# Patient Record
Sex: MALE | Race: WHITE | NOT HISPANIC OR LATINO | Employment: FULL TIME | URBAN - METROPOLITAN AREA
[De-identification: names, ages, dates, MRNs, and addresses within clinical notes are randomized per-mention and may not be internally consistent; named-entity substitution may affect disease eponyms.]

---

## 2018-02-07 ENCOUNTER — OFFICE VISIT (OUTPATIENT)
Dept: RHEUMATOLOGY | Facility: CLINIC | Age: 48
End: 2018-02-07
Payer: COMMERCIAL

## 2018-02-07 VITALS
HEIGHT: 70 IN | WEIGHT: 230 LBS | SYSTOLIC BLOOD PRESSURE: 130 MMHG | BODY MASS INDEX: 32.93 KG/M2 | DIASTOLIC BLOOD PRESSURE: 86 MMHG

## 2018-02-07 DIAGNOSIS — Z79.899 ENCOUNTER FOR LONG-TERM (CURRENT) DRUG USE: ICD-10-CM

## 2018-02-07 DIAGNOSIS — M10.9 GOUT, UNSPECIFIED CAUSE, UNSPECIFIED CHRONICITY, UNSPECIFIED SITE: Primary | ICD-10-CM

## 2018-02-07 LAB
ALBUMIN SERPL-MCNC: 4.4 G/DL (ref 3.1–4.7)
ALP SERPL-CCNC: 83 IU/L (ref 40–104)
ALT (SGPT): 25 IU/L (ref 3–33)
AST SERPL-CCNC: 15 IU/L (ref 10–40)
BASOPHILS NFR BLD: 0 K/UL (ref 0–0.2)
BASOPHILS NFR BLD: 0.3 %
BILIRUB SERPL-MCNC: 0.8 MG/DL (ref 0.3–1)
BUN SERPL-MCNC: 13 MG/DL (ref 8–20)
CALCIUM SERPL-MCNC: 9.6 MG/DL (ref 7.7–10.4)
CHLORIDE: 103 MMOL/L (ref 98–110)
CO2 SERPL-SCNC: 25.7 MMOL/L (ref 22.8–31.6)
CREATININE: 0.76 MG/DL (ref 0.6–1.4)
CRP SERPL-MCNC: 1.19 MG/DL (ref 0–1.4)
EOSINOPHIL NFR BLD: 0.3 K/UL (ref 0–0.7)
EOSINOPHIL NFR BLD: 2.6 %
ERYTHROCYTE [DISTWIDTH] IN BLOOD BY AUTOMATED COUNT: 14.3 % (ref 11.7–14.9)
GLUCOSE: 81 MG/DL (ref 70–99)
GRAN #: 8 K/UL (ref 1.4–6.5)
GRAN%: 68.5 %
HCT VFR BLD AUTO: 43 % (ref 39–55)
HGB BLD-MCNC: 14.1 G/DL (ref 14–16)
IMMATURE GRANS (ABS): 0 K/UL (ref 0–1)
IMMATURE GRANULOCYTES: 0.3 %
LYMPH #: 2.5 K/UL (ref 1.2–3.4)
LYMPH%: 21.5 %
MCH RBC QN AUTO: 27.5 PG (ref 25–35)
MCHC RBC AUTO-ENTMCNC: 32.8 G/DL (ref 31–36)
MCV RBC AUTO: 83.8 FL (ref 80–100)
MONO #: 0.8 K/UL (ref 0.1–0.6)
MONO%: 6.8 %
NUCLEATED RBCS: 0 %
PLATELET # BLD AUTO: 308 K/UL (ref 140–440)
PMV BLD AUTO: 10.5 FL (ref 8.8–12.7)
POTASSIUM SERPL-SCNC: 3.9 MMOL/L (ref 3.5–5)
PROT SERPL-MCNC: 7.2 G/DL (ref 6–8.2)
RBC # BLD AUTO: 5.13 M/UL (ref 4.3–5.9)
SODIUM: 137 MMOL/L (ref 134–144)
WBC # BLD AUTO: 11.7 K/UL (ref 5–10)

## 2018-02-07 PROCEDURE — 99204 OFFICE O/P NEW MOD 45 MIN: CPT | Mod: ,,, | Performed by: INTERNAL MEDICINE

## 2018-02-07 PROCEDURE — 3008F BODY MASS INDEX DOCD: CPT | Mod: ,,, | Performed by: INTERNAL MEDICINE

## 2018-02-07 RX ORDER — SULINDAC 200 MG/1
200 TABLET ORAL 2 TIMES DAILY
Qty: 60 TABLET | Refills: 5 | Status: SHIPPED | OUTPATIENT
Start: 2018-02-07 | End: 2018-09-01 | Stop reason: SDUPTHER

## 2018-02-07 NOTE — PROGRESS NOTES
[facility] RHEUMATOLOGY           New patient visit      Subjective:       Patient ID:   NAME: Shayne Huerta Jr. : 1970     47 y.o. male    Referring Doc: No ref. provider found  Other Physicians:    Chief Complaint:  Consult (new patient - referred by Dr. Schroeder) and Gout (had surgery on left knee for infection and gout)        HPI:   This patient is referred by Dr. Schroeder for the evaluation and management of gout. He states that he has had problems with gout for perhaps 20 years. 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 Fluid was apparently aspirated and urate crystals were identified. He was placed on allopurinol and as needed Indocin and apparently did quite well with that regimen. He regulated his diet and noted that he had no activity of gouty arthritis. He therefore discontinued his medication and did quite well for a long interval though in the last year or 2, he has begun of experience rather frequent episodes of gout. More recently, he had an episode in the left knee that was rather atypical for him. This was treated by orthopedics. Fluid aspirate apparently revealed urate crystals but also a bacterial culture was positive and the patient was therefore treated for both gout and for a septic knee. He seems to have done well with that and is now off antibiotics.  He is not having any gouty activity at the time of this evaluation.    ROS:   GEN:      no fever, night sweats or weight loss  SKIN:     no rashes , erythema, bruising, or swelling, no Raynauds, no photosensitivity  HEENT: no HAs, no changes in vision, no mouth ulcers, no sicca symptoms, no scalp tenderness, jaw claudication.  CV:        no CP, SOB, PND, AGRAWAL or orthopnea,no palpitations  PULM:   no SOB, cough, hemoptysis, sputum or pleuritic pain  GI:         no abdominal pain, nausea, vomiting, constipation, diarrhea, melanotic stools, BRBPR, or hematemesis.no dysphagia  :       no hematuria,  "dysuria  NEURO:no paresthesias, headaches, visual disturbances, muscle weakness  MUSCULOSKELETAL:  Migratory monoarthritis as described above  PSYCH: No insomnia, no significant depression, no anxiety    Medications:    Current Outpatient Prescriptions:     sulindac (CLINORIL) 200 MG Tab, Take 1 tablet (200 mg total) by mouth 2 (two) times daily., Disp: 60 tablet, Rfl: 5    FAMILY HISTORY: negative for Connective Tissue Disease        Review of patient's allergies indicates:  No Known Allergies          Objective:     Vitals:  Blood pressure 130/86, height 5' 10" (1.778 m), weight 104.3 kg (230 lb).    Physical Examination:   GEN:    wn/wd male in no apparent distress  SKIN:    no rashes, no lesions, no sclerodactyly, no Raynaud's, no periungual erythema  HEAD:   no alopecia, no scalp tenderness, no temporal artery tenderness or induration.  EYES:   no pallor, no icterus, PERRLA  ENT:     no thrush, no mucosal dryness or ulcerations, adequate oral hygiene & dentition.  NECK:  supple x 6, no masses, no thyromegaly, no lymphadenopathy.  CV:        S1 and S2 regular, no murmurs, gallop or rubs  CHEST: Normal respiratory effort;  normal breath sounds/no adventitious sounds. No signs of consolidation.  ABD:      non-tender and non-distended; soft; normal bowel sounds; no rebound/guarding or tenderness. No hepatosplenomegaly.  Musculoskeletal:  There is no evidence of any inflammatory joint disease.There are no deformities noted. There are no tophi present.  EXTREM: no clubbing, cyanosis or edema. normal pulses.  NEURO: grossly intact; motor/sensory WNL; AAOx3; no tremors  PSYCH:  normal mood, affect and behavior            Labs:   No results found for: WBC, HGB, HCT, MCV, PLTCMP@  No results found for: NA, K, CL, CO2, GLU, BUN, CREATININE, CALCIUM, PROT, ALBUMIN, BILITOT, ALKPHOS, AST, ALT, CPK, CRP, JOSE, RF, URICACID      Radiology/Diagnostic Studies:    none    Assessment/Discussion/Plan:   47 y.o. male with chronic " gout, off medication.      PLAN:  I am going to change his Indocin to a more tolerable Clinoril. He will take 200 mg twice a day for at least the next 6 months. I'm not going to put him on a urate lowering drug at this point. Rather, I have ordered routine baseline blood tests and a 24-hour urine uric acid. Once I am certain of the etiology, that is, over-production versus under-secretion, I will select the correct drug and begin treating him.  I have provided him with literature on gout. He has already researched the topic and is up to date on a proper diet. He does not consume alcohol.    RTC:   I will see him back in 2 weeks      Electronically signed by Wong Pagan MD

## 2018-02-07 NOTE — PATIENT INSTRUCTIONS

## 2018-02-14 LAB — CCP ANTIBODIES IGG/IGA: 3 UNITS (ref 0–19)

## 2018-02-22 ENCOUNTER — OFFICE VISIT (OUTPATIENT)
Dept: RHEUMATOLOGY | Facility: CLINIC | Age: 48
End: 2018-02-22
Payer: COMMERCIAL

## 2018-02-22 VITALS
BODY MASS INDEX: 32.5 KG/M2 | HEIGHT: 70 IN | SYSTOLIC BLOOD PRESSURE: 126 MMHG | DIASTOLIC BLOOD PRESSURE: 80 MMHG | WEIGHT: 227 LBS

## 2018-02-22 DIAGNOSIS — M10.9 GOUT, UNSPECIFIED CAUSE, UNSPECIFIED CHRONICITY, UNSPECIFIED SITE: Primary | ICD-10-CM

## 2018-02-22 PROCEDURE — 3008F BODY MASS INDEX DOCD: CPT | Mod: ,,, | Performed by: INTERNAL MEDICINE

## 2018-02-22 PROCEDURE — 99213 OFFICE O/P EST LOW 20 MIN: CPT | Mod: ,,, | Performed by: INTERNAL MEDICINE

## 2018-02-22 RX ORDER — ALLOPURINOL 300 MG/1
300 TABLET ORAL DAILY
Qty: 30 TABLET | Refills: 5 | Status: SHIPPED | OUTPATIENT
Start: 2018-02-22 | End: 2018-09-01 | Stop reason: SDUPTHER

## 2018-02-22 RX ORDER — COLCHICINE 0.6 MG/1
0.6 TABLET ORAL DAILY
Qty: 60 TABLET | Refills: 5 | Status: SHIPPED | OUTPATIENT
Start: 2018-02-22 | End: 2019-01-15 | Stop reason: ALTCHOICE

## 2018-02-22 NOTE — PATIENT INSTRUCTIONS

## 2018-04-19 ENCOUNTER — OFFICE VISIT (OUTPATIENT)
Dept: RHEUMATOLOGY | Facility: CLINIC | Age: 48
End: 2018-04-19
Payer: COMMERCIAL

## 2018-04-19 VITALS — SYSTOLIC BLOOD PRESSURE: 138 MMHG | DIASTOLIC BLOOD PRESSURE: 98 MMHG | WEIGHT: 237.5 LBS | BODY MASS INDEX: 34.08 KG/M2

## 2018-04-19 DIAGNOSIS — M10.9 GOUT, UNSPECIFIED CAUSE, UNSPECIFIED CHRONICITY, UNSPECIFIED SITE: Primary | ICD-10-CM

## 2018-04-19 DIAGNOSIS — Z79.899 ENCOUNTER FOR LONG-TERM (CURRENT) DRUG USE: ICD-10-CM

## 2018-04-19 LAB
BASOPHILS NFR BLD: 0.1 K/UL (ref 0–0.2)
BASOPHILS NFR BLD: 0.6 %
BUN SERPL-MCNC: 12 MG/DL (ref 8–20)
CALCIUM SERPL-MCNC: 9.3 MG/DL (ref 7.7–10.4)
CHLORIDE: 107 MMOL/L (ref 98–110)
CO2 SERPL-SCNC: 27.6 MMOL/L (ref 22.8–31.6)
CREATININE: 0.89 MG/DL (ref 0.6–1.4)
EOSINOPHIL NFR BLD: 0.6 K/UL (ref 0–0.7)
EOSINOPHIL NFR BLD: 7.1 %
ERYTHROCYTE [DISTWIDTH] IN BLOOD BY AUTOMATED COUNT: 14.9 % (ref 11.7–14.9)
GLUCOSE: 108 MG/DL (ref 70–99)
GRAN #: 4 K/UL (ref 1.4–6.5)
GRAN%: 47 %
HCT VFR BLD AUTO: 45.3 % (ref 39–55)
HGB BLD-MCNC: 15 G/DL (ref 14–16)
IMMATURE GRANS (ABS): 0 K/UL (ref 0–1)
IMMATURE GRANULOCYTES: 0.1 %
LYMPH #: 3.2 K/UL (ref 1.2–3.4)
LYMPH%: 38 %
MCH RBC QN AUTO: 28.1 PG (ref 25–35)
MCHC RBC AUTO-ENTMCNC: 33.1 G/DL (ref 31–36)
MCV RBC AUTO: 85 FL (ref 80–100)
MONO #: 0.6 K/UL (ref 0.1–0.6)
MONO%: 7.2 %
NUCLEATED RBCS: 0 %
PLATELET # BLD AUTO: 244 K/UL (ref 140–440)
PMV BLD AUTO: 11.5 FL (ref 8.8–12.7)
POTASSIUM SERPL-SCNC: 3.4 MMOL/L (ref 3.5–5)
RBC # BLD AUTO: 5.33 M/UL (ref 4.3–5.9)
SODIUM: 139 MMOL/L (ref 134–144)
URATE SERPL-MCNC: 6.1 MG/DL (ref 2.6–7.8)
WBC # BLD AUTO: 8.5 K/UL (ref 5–10)

## 2018-04-19 PROCEDURE — 99213 OFFICE O/P EST LOW 20 MIN: CPT | Mod: ,,, | Performed by: INTERNAL MEDICINE

## 2018-04-19 NOTE — PROGRESS NOTES
"      [facility] RHEUMATOLOGY           Follow-up visit      Subjective:       Patient ID:   NAME: Shayne Huerta Jr. : 1970     47 y.o. male    Referring Doc: No ref. provider found  Other Physicians:    Chief Complaint:  Gout (Takes Allopurinol 300mg QD, Colchicine 0.6mg QD, Clinoril 200mg BID)      HPI/Interval History:   The patient is doing well. He does feel "twinges" occasionally but finds that using sulindac diverts any further development.          ROS:   GEN:    no fever, night sweats or weight loss  SKIN:   no rashes , erythema, bruising, or swelling, no Raynauds, no photosensitivity  HEENT: no HAs, no changes in vision, no mouth ulcers, no sicca symptoms, no scalp tenderness, jaw claudication.  CV:      no CP, SOB, PND, AGRAWAL or orthopnea,no palpitations  PULM: no SOB, cough, hemoptysis, sputum or pleuritic pain  GI:      no abdominal pain, nausea, vomiting, constipation, diarrhea, melanotic stools, BRBPR, or hematemesis, no dysphagia, no GERD  :     no hematuria, dysuria  NEURO: no paresthesias, headaches, visual disturbances  MUSCULOSKELETAL:  No muscle or joint pain or swelling  PSYCH:   No insomnia, no significant depression, no anxiety    Medications:    Current Outpatient Prescriptions:     allopurinol (ZYLOPRIM) 300 MG tablet, Take 1 tablet (300 mg total) by mouth once daily., Disp: 30 tablet, Rfl: 5    colchicine 0.6 mg tablet, Take 1 tablet (0.6 mg total) by mouth once daily., Disp: 60 tablet, Rfl: 5    sulindac (CLINORIL) 200 MG Tab, Take 1 tablet (200 mg total) by mouth 2 (two) times daily., Disp: 60 tablet, Rfl: 5      FAMILY HISTORY: negative for Connective Tissue Disease        Review of patient's allergies indicates:  No Known Allergies          Objective:     Vitals:  Blood pressure (!) 138/98, weight 107.7 kg (237 lb 8 oz).    Physical Examination:   GEN: wn/wd male in no apparent distress  SKIN: no rashes, no lesions, no sclerodactyly, no Raynaud's, no periungual erythema  HEAD: " no alopecia, no scalp tenderness, no temporal artery tenderness or induration.  EYES: no pallor, no icterus, PERRLA  ENT:  no thrush, no mucosal dryness or ulcerations, adequate oral hygiene & dentition.  NECK: supple x 6, no masses, no thyromegaly, no lymphadenopathy.  CV:   S1 and S2 regular, no murmurs, gallop or rubs  CHEST: Normal respiratory effort;  normal breath sounds/no adventitious sounds. No signs of consolidation.  ABD: non-tender and non-distended; soft; normal bowel sounds; no rebound/guarding or tenderness. No hepatosplenomegaly.  Musculoskeletal:  No active inflammatory arthritis. No deformities. No peripheral tophi  EXTREM: no clubbing, cyanosis or edema. normal pulses.  NEURO: grossly intact; motor/sensory WNL; AAOx3; no tremors  PSYCH:  normal mood, affect and behavior            Labs:   Lab Results   Component Value Date    WBC 11.7 (H) 02/07/2018    HGB 14.1 02/07/2018    HCT 43.0 02/07/2018    MCV 83.8 02/07/2018     02/07/2018   CMP@  Sodium   Date Value Ref Range Status   02/07/2018 137 134 - 144 mmol/L      Potassium   Date Value Ref Range Status   02/07/2018 3.9 3.5 - 5.0 mmol/L      Chloride   Date Value Ref Range Status   02/07/2018 103 98 - 110 mmol/L      CO2   Date Value Ref Range Status   02/07/2018 25.7 22.8 - 31.6 mmol/L      Glucose   Date Value Ref Range Status   02/07/2018 81 70 - 99 mg/dL      BUN, Bld   Date Value Ref Range Status   02/07/2018 13 8 - 20 mg/dL      Creatinine   Date Value Ref Range Status   02/07/2018 0.76 0.60 - 1.40 mg/dL      Calcium   Date Value Ref Range Status   02/07/2018 9.6 7.7 - 10.4 mg/dL      Total Protein   Date Value Ref Range Status   02/07/2018 7.2 6.0 - 8.2 g/dL      Albumin   Date Value Ref Range Status   02/07/2018 4.4 3.1 - 4.7 g/dL      Total Bilirubin   Date Value Ref Range Status   02/07/2018 0.8 0.3 - 1.0 mg/dL      Alkaline Phosphatase   Date Value Ref Range Status   02/07/2018 83 40 - 104 IU/L      AST   Date Value Ref Range  Status   02/07/2018 15 10 - 40 IU/L      CRP   Date Value Ref Range Status   02/07/2018 1.19 0.00 - 1.40 mg/dL          Radiology/Diagnostic Studies:    none    Assessment/Discussion/Plan:   47 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 Routine blood testing was obtained.      RTC:  I will see him back in 3 months.      Electronically signed by Wong Pagan MD

## 2018-06-27 ENCOUNTER — OFFICE VISIT (OUTPATIENT)
Dept: RHEUMATOLOGY | Facility: CLINIC | Age: 48
End: 2018-06-27
Payer: COMMERCIAL

## 2018-06-27 VITALS
BODY MASS INDEX: 33.88 KG/M2 | DIASTOLIC BLOOD PRESSURE: 106 MMHG | SYSTOLIC BLOOD PRESSURE: 144 MMHG | WEIGHT: 236.13 LBS

## 2018-06-27 DIAGNOSIS — Z79.899 ENCOUNTER FOR LONG-TERM (CURRENT) DRUG USE: ICD-10-CM

## 2018-06-27 DIAGNOSIS — M10.9 GOUT, UNSPECIFIED CAUSE, UNSPECIFIED CHRONICITY, UNSPECIFIED SITE: Primary | ICD-10-CM

## 2018-06-27 PROCEDURE — 99213 OFFICE O/P EST LOW 20 MIN: CPT | Mod: ,,, | Performed by: INTERNAL MEDICINE

## 2018-06-27 NOTE — PROGRESS NOTES
[facility] RHEUMATOLOGY           Follow-up visit    Notes dictated via Dragon to EPIC. Please forgive any unintentional errors.  Subjective:       Patient ID:   NAME: Shayne Huerta Jr. : 1970     47 y.o. male    Referring Doc: No ref. provider found  Other Physicians:    Chief Complaint:  Gout (Takes Allopurinol 300 mg QD, Colchicine 0.6mg QD, Sulindac 200mg QD)      HPI/Interval History:   The patient has had no episodes of gout.          ROS:   GEN:    no fever, night sweats or weight loss  SKIN:   no rashes , erythema, bruising, or swelling, no Raynauds, no photosensitivity  HEENT: no changes in vision, no mouth ulcers, no sicca symptoms, no scalp tenderness, no jaw claudication.  CV:      no CP, PND, AGRAWAL or orthopnea, no palpitations  PULM: no SOB, cough, hemoptysis, sputum or pleuritic pain  GI:       no abdominal pain, nausea, vomiting, constipation, diarrhea, melanotic stools, BRBPR, or hematemesis, no dysphagia, no GERD  :     no hematuria, dysuria  NEURO: no paresthesias, headaches, acute visual disturbances  MUSCULOSKELETAL:  No complaints of joint pain, redness, swelling, or warmth  PSYCH:   No insomnia, no significant anxiety or depression    Medications:    Current Outpatient Prescriptions:     allopurinol (ZYLOPRIM) 300 MG tablet, Take 1 tablet (300 mg total) by mouth once daily., Disp: 30 tablet, Rfl: 5    colchicine 0.6 mg tablet, Take 1 tablet (0.6 mg total) by mouth once daily., Disp: 60 tablet, Rfl: 5    sulindac (CLINORIL) 200 MG Tab, Take 1 tablet (200 mg total) by mouth 2 (two) times daily. (Patient taking differently: Take 200 mg by mouth once daily. ), Disp: 60 tablet, Rfl: 5      FAMILY HISTORY: negative for Connective Tissue Disease        Review of patient's allergies indicates:  No Known Allergies          Objective:     Vitals:  Blood pressure (!) 144/106, weight 107.1 kg (236 lb 1.6 oz).    Physical Examination:   GEN: wn/wd male in no apparent distress  SKIN: no rashes,  no lesions, no sclerodactyly, no Raynaud's, no periungual erythema  HEAD: no alopecia, no scalp tenderness, no temporal artery tenderness or induration.  EYES: no pallor, no icterus, PERRLA  ENT:  no thrush, no mucosal dryness or ulcerations, adequate oral hygiene & dentition.  NECK: supple x 6, no masses, no thyromegaly, no lymphadenopathy.  CV:   S1 and S2 regular, no murmurs, gallop or rubs  CHEST: Normal respiratory effort;  normal breath sounds/no adventitious sounds. No signs of consolidation.  ABD: non-tender and non-distended; soft; normal bowel sounds; no rebound/guarding or tenderness. No hepatosplenomegaly.  Musculoskeletal:  No evidence of active inflammatory arthritis or of peripheral tophi  EXTREM: no clubbing, cyanosis or edema. normal pulses.  NEURO: grossly intact; motor/sensory WNL; no tremors  PSYCH:  normal mood, affect and behavior            Labs:   Lab Results   Component Value Date    WBC 8.5 04/19/2018    HGB 15.0 04/19/2018    HCT 45.3 04/19/2018    MCV 85.0 04/19/2018     04/19/2018   CMP@  Sodium   Date Value Ref Range Status   04/19/2018 139 134 - 144 mmol/L      Potassium   Date Value Ref Range Status   04/19/2018 3.4 (L) 3.5 - 5.0 mmol/L      Chloride   Date Value Ref Range Status   04/19/2018 107 98 - 110 mmol/L      CO2   Date Value Ref Range Status   04/19/2018 27.6 22.8 - 31.6 mmol/L      Glucose   Date Value Ref Range Status   04/19/2018 108 (H) 70 - 99 mg/dL      BUN, Bld   Date Value Ref Range Status   04/19/2018 12 8 - 20 mg/dL      Creatinine   Date Value Ref Range Status   04/19/2018 0.89 0.60 - 1.40 mg/dL      Calcium   Date Value Ref Range Status   04/19/2018 9.3 7.7 - 10.4 mg/dL      Total Protein   Date Value Ref Range Status   02/07/2018 7.2 6.0 - 8.2 g/dL      Albumin   Date Value Ref Range Status   02/07/2018 4.4 3.1 - 4.7 g/dL      Total Bilirubin   Date Value Ref Range Status   02/07/2018 0.8 0.3 - 1.0 mg/dL      Alkaline Phosphatase   Date Value Ref Range  Status   02/07/2018 83 40 - 104 IU/L      AST   Date Value Ref Range Status   02/07/2018 15 10 - 40 IU/L      CRP   Date Value Ref Range Status   02/07/2018 1.19 0.00 - 1.40 mg/dL      Uric Acid   Date Value Ref Range Status   04/19/2018 6.1 2.6 - 7.8 mg/dL          Radiology/Diagnostic Studies:    none    Assessment/Discussion/Plan:   47 y.o. male with gouty arthritis-excellent control on current regimen of allopurinol 300+ colchicine as needed and sulindac as needed      PLAN:  I will continue his medication unchanged. Routine blood testing was obtained.      RTC:  I will see him back in 4 months.      Electronically signed by Wong Pagan MD

## 2018-09-01 DIAGNOSIS — M10.9 GOUT, UNSPECIFIED CAUSE, UNSPECIFIED CHRONICITY, UNSPECIFIED SITE: ICD-10-CM

## 2018-09-01 RX ORDER — ALLOPURINOL 300 MG/1
TABLET ORAL
Qty: 30 TABLET | Refills: 0 | Status: SHIPPED | OUTPATIENT
Start: 2018-09-01 | End: 2018-10-02 | Stop reason: SDUPTHER

## 2018-09-01 RX ORDER — SULINDAC 200 MG/1
TABLET ORAL
Qty: 60 TABLET | Refills: 0 | Status: SHIPPED | OUTPATIENT
Start: 2018-09-01 | End: 2018-10-02 | Stop reason: SDUPTHER

## 2018-10-02 DIAGNOSIS — M10.9 GOUT, UNSPECIFIED CAUSE, UNSPECIFIED CHRONICITY, UNSPECIFIED SITE: ICD-10-CM

## 2018-10-02 RX ORDER — SULINDAC 200 MG/1
TABLET ORAL
Qty: 60 TABLET | Refills: 5 | Status: SHIPPED | OUTPATIENT
Start: 2018-10-02 | End: 2019-05-16 | Stop reason: SDUPTHER

## 2018-10-02 RX ORDER — ALLOPURINOL 300 MG/1
TABLET ORAL
Qty: 30 TABLET | Refills: 5 | Status: SHIPPED | OUTPATIENT
Start: 2018-10-02 | End: 2019-01-15 | Stop reason: SDUPTHER

## 2019-01-15 ENCOUNTER — OFFICE VISIT (OUTPATIENT)
Dept: RHEUMATOLOGY | Facility: CLINIC | Age: 49
End: 2019-01-15
Payer: COMMERCIAL

## 2019-01-15 VITALS
WEIGHT: 248.63 LBS | DIASTOLIC BLOOD PRESSURE: 90 MMHG | BODY MASS INDEX: 35.67 KG/M2 | SYSTOLIC BLOOD PRESSURE: 145 MMHG

## 2019-01-15 DIAGNOSIS — M10.9 GOUT, UNSPECIFIED CAUSE, UNSPECIFIED CHRONICITY, UNSPECIFIED SITE: Primary | ICD-10-CM

## 2019-01-15 DIAGNOSIS — Z79.899 ENCOUNTER FOR LONG-TERM (CURRENT) DRUG USE: ICD-10-CM

## 2019-01-15 LAB
BUN SERPL-MCNC: 15 MG/DL (ref 8–20)
CALCIUM SERPL-MCNC: 9.3 MG/DL (ref 7.7–10.4)
CHLORIDE: 101 MMOL/L (ref 98–110)
CO2 SERPL-SCNC: 28.9 MMOL/L (ref 22.8–31.6)
CREATININE: 0.96 MG/DL (ref 0.6–1.4)
GLUCOSE: 88 MG/DL (ref 70–99)
POTASSIUM SERPL-SCNC: 4.1 MMOL/L (ref 3.5–5)
SODIUM: 139 MMOL/L (ref 134–144)
URATE SERPL-MCNC: 8.7 MG/DL (ref 2.6–7.8)

## 2019-01-15 PROCEDURE — 99213 OFFICE O/P EST LOW 20 MIN: CPT | Mod: ,,, | Performed by: INTERNAL MEDICINE

## 2019-01-15 PROCEDURE — 99213 PR OFFICE/OUTPT VISIT, EST, LEVL III, 20-29 MIN: ICD-10-PCS | Mod: ,,, | Performed by: INTERNAL MEDICINE

## 2019-01-15 RX ORDER — ALLOPURINOL 300 MG/1
TABLET ORAL
Qty: 30 TABLET | Refills: 5 | Status: SHIPPED | OUTPATIENT
Start: 2019-01-15 | End: 2019-07-03 | Stop reason: SDUPTHER

## 2019-01-15 NOTE — PROGRESS NOTES
[facility] RHEUMATOLOGY           Follow-up visit    Notes dictated via Dragon to EPIC. Please forgive any unintended errors.  Subjective:       Patient ID:   NAME: Shayne Huerta Jr. : 1970     48 y.o. male    Referring Doc: No ref. provider found  Other Physicians:    Chief Complaint:  Gout (Needs refill on Colchicine)      HPI/Interval History:   The patient is doing well. He has had no episodes of gout since his prior visit. He is taking his medications faithfully.          ROS:   GEN:    no fever, night sweats or weight loss  SKIN:   no rashes , erythema, bruising, or swelling, no Raynauds, no photosensitivity  HEENT: no changes in vision, no mouth ulcers, no sicca symptoms, no scalp tenderness, no jaw claudication.  CV:      no CP, PND, AGRAWAL or orthopnea, no palpitations  PULM: no SOB, cough, hemoptysis, sputum or pleuritic pain  GI:       no GERD, no dysphagia, no abdominal pain, nausea, vomiting, constipation, diarrhea, melanotic stools, BRBPR, or hematemesis  :      no hematuria, dysuria  NEURO: no paresthesias, headaches, acute visual disturbances  MUSCULOSKELETAL:  No muscle or joint complaints. No red, hot, swollen joints  PSYCH:   No insomnia, no significant anxiety or depression    Medications:    Current Outpatient Medications:     allopurinol (ZYLOPRIM) 300 MG tablet, TAKE 1 TABLET(300 MG) BY MOUTH EVERY DAY, Disp: 30 tablet, Rfl: 5    sulindac (CLINORIL) 200 MG Tab, TAKE 1 TABLET(200 MG) BY MOUTH TWICE DAILY (Patient taking differently: TAKE 1 TABLET(200 MG) BY MOUTH ONCE DAILY), Disp: 60 tablet, Rfl: 5      FAMILY HISTORY: negative for Connective Tissue Disease        Review of patient's allergies indicates:  No Known Allergies          Objective:     Vitals:  Blood pressure (!) 145/90, weight 112.8 kg (248 lb 9.6 oz).    Physical Examination:   GEN: wn/wd male in no apparent distress  SKIN: no rashes, no sclerodactyly, no Raynaud's, no periungual erythema, no digital tip ulcerations,  no nailbed pitting  HEAD: no alopecia, no scalp tenderness, no temporal artery tenderness or induration.  EYES: no pallor, no icterus, PERRLA  ENT:  no thrush, no mucosal dryness or ulcerations, adequate oral hygiene & dentition.  NECK: supple x 6, no masses, no thyromegaly, no lymphadenopathy.  CV:   S1 and S2 regular, no murmurs, gallop or rubs  CHEST: Normal respiratory effort;  normal breath sounds/no adventitious sounds. No signs of consolidation.  ABD: non-tender and non-distended; soft; normal bowel sounds; no rebound/guarding or tenderness. No hepatosplenomegaly.  Musculoskeletal:  No evidence of inflammatory arthritis. No peripheral tophi  EXTREM: no clubbing, cyanosis or edema. normal pulses.  NEURO:  grossly intact; motor/sensory WNL; no tremors  PSYCH:  normal mood, affect and behavior            Labs:   Lab Results   Component Value Date    WBC 8.5 04/19/2018    HGB 15.0 04/19/2018    HCT 45.3 04/19/2018    MCV 85.0 04/19/2018     04/19/2018   CMP@  Sodium   Date Value Ref Range Status   04/19/2018 139 134 - 144 mmol/L      Potassium   Date Value Ref Range Status   04/19/2018 3.4 (L) 3.5 - 5.0 mmol/L      Chloride   Date Value Ref Range Status   04/19/2018 107 98 - 110 mmol/L      CO2   Date Value Ref Range Status   04/19/2018 27.6 22.8 - 31.6 mmol/L      Glucose   Date Value Ref Range Status   04/19/2018 108 (H) 70 - 99 mg/dL      BUN, Bld   Date Value Ref Range Status   04/19/2018 12 8 - 20 mg/dL      Creatinine   Date Value Ref Range Status   04/19/2018 0.89 0.60 - 1.40 mg/dL      Calcium   Date Value Ref Range Status   04/19/2018 9.3 7.7 - 10.4 mg/dL      Total Protein   Date Value Ref Range Status   02/07/2018 7.2 6.0 - 8.2 g/dL      Albumin   Date Value Ref Range Status   02/07/2018 4.4 3.1 - 4.7 g/dL      Total Bilirubin   Date Value Ref Range Status   02/07/2018 0.8 0.3 - 1.0 mg/dL      Alkaline Phosphatase   Date Value Ref Range Status   02/07/2018 83 40 - 104 IU/L      AST   Date Value  Ref Range Status   02/07/2018 15 10 - 40 IU/L      CRP   Date Value Ref Range Status   02/07/2018 1.19 0.00 - 1.40 mg/dL      Uric Acid   Date Value Ref Range Status   04/19/2018 6.1 2.6 - 7.8 mg/dL          Radiology/Diagnostic Studies:    none    Assessment/Discussion/Plan:   48 y.o. male with gout-well controlled on allopurinol 300 mg      PLAN:  He has not had an attack in about 1 year. I will therefore discontinue colchicine. I also suggested that he uses Clinoril sparingly and only when needed. One half tablet is also a viable option for mild-to-moderate pain.  Routine blood testing was obtained.    RTC:  I will see him back in 4 months      Electronically signed by Wong Pagan MD

## 2019-01-17 DIAGNOSIS — M10.00 IDIOPATHIC GOUT, UNSPECIFIED CHRONICITY, UNSPECIFIED SITE: Primary | ICD-10-CM

## 2019-01-17 DIAGNOSIS — M10.00 IDIOPATHIC GOUT, UNSPECIFIED CHRONICITY, UNSPECIFIED SITE: ICD-10-CM

## 2019-01-17 DIAGNOSIS — E79.0 HYPERURICEMIA: ICD-10-CM

## 2019-01-17 DIAGNOSIS — Z79.899 ENCOUNTER FOR LONG-TERM (CURRENT) USE OF MEDICATIONS: Primary | ICD-10-CM

## 2019-01-17 RX ORDER — ALLOPURINOL 100 MG/1
100 TABLET ORAL DAILY
COMMUNITY
End: 2019-01-17 | Stop reason: SDUPTHER

## 2019-01-17 RX ORDER — ALLOPURINOL 100 MG/1
100 TABLET ORAL DAILY
Qty: 30 TABLET | Refills: 5 | Status: SHIPPED | OUTPATIENT
Start: 2019-01-17 | End: 2019-07-03 | Stop reason: SDUPTHER

## 2019-03-04 LAB
BASOPHILS NFR BLD: 0 K/UL (ref 0–0.2)
BASOPHILS NFR BLD: 0.4 %
BUN SERPL-MCNC: 18 MG/DL (ref 8–20)
CALCIUM SERPL-MCNC: 9.5 MG/DL (ref 7.7–10.4)
CHLORIDE: 103 MMOL/L (ref 98–110)
CO2 SERPL-SCNC: 28.1 MMOL/L (ref 22.8–31.6)
CREATININE: 0.79 MG/DL (ref 0.6–1.4)
EOSINOPHIL NFR BLD: 0.4 K/UL (ref 0–0.7)
EOSINOPHIL NFR BLD: 4.8 %
ERYTHROCYTE [DISTWIDTH] IN BLOOD BY AUTOMATED COUNT: 13.2 % (ref 11.7–14.9)
GLUCOSE: 112 MG/DL (ref 70–99)
GRAN #: 5 K/UL (ref 1.4–6.5)
GRAN%: 58.6 %
HCT VFR BLD AUTO: 46.6 % (ref 39–55)
HGB BLD-MCNC: 15.5 G/DL (ref 14–16)
IMMATURE GRANS (ABS): 0 K/UL (ref 0–1)
IMMATURE GRANULOCYTES: 0.2 %
LYMPH #: 2.4 K/UL (ref 1.2–3.4)
LYMPH%: 28.6 %
MCH RBC QN AUTO: 28.2 PG (ref 25–35)
MCHC RBC AUTO-ENTMCNC: 33.3 G/DL (ref 31–36)
MCV RBC AUTO: 84.7 FL (ref 80–100)
MONO #: 0.6 K/UL (ref 0.1–0.6)
MONO%: 7.4 %
NUCLEATED RBCS: 0 %
PLATELET # BLD AUTO: 234 K/UL (ref 140–440)
PMV BLD AUTO: 10.6 FL (ref 8.8–12.7)
POTASSIUM SERPL-SCNC: 4 MMOL/L (ref 3.5–5)
RBC # BLD AUTO: 5.5 M/UL (ref 4.3–5.9)
SODIUM: 139 MMOL/L (ref 134–144)
URATE SERPL-MCNC: 5.1 MG/DL (ref 2.6–7.8)
WBC # BLD AUTO: 8.5 K/UL (ref 5–10)

## 2019-05-16 ENCOUNTER — OFFICE VISIT (OUTPATIENT)
Dept: RHEUMATOLOGY | Facility: CLINIC | Age: 49
End: 2019-05-16
Payer: COMMERCIAL

## 2019-05-16 VITALS — DIASTOLIC BLOOD PRESSURE: 86 MMHG | WEIGHT: 246 LBS | SYSTOLIC BLOOD PRESSURE: 142 MMHG | BODY MASS INDEX: 35.3 KG/M2

## 2019-05-16 DIAGNOSIS — Z79.899 ENCOUNTER FOR LONG-TERM (CURRENT) USE OF MEDICATIONS: ICD-10-CM

## 2019-05-16 DIAGNOSIS — M10.00 IDIOPATHIC GOUT, UNSPECIFIED CHRONICITY, UNSPECIFIED SITE: Primary | ICD-10-CM

## 2019-05-16 DIAGNOSIS — M10.9 GOUT, UNSPECIFIED CAUSE, UNSPECIFIED CHRONICITY, UNSPECIFIED SITE: ICD-10-CM

## 2019-05-16 LAB
BASOPHILS NFR BLD: 0 K/UL (ref 0–0.2)
BASOPHILS NFR BLD: 0.3 %
BUN SERPL-MCNC: 13 MG/DL (ref 8–20)
CALCIUM SERPL-MCNC: 9.4 MG/DL (ref 7.7–10.4)
CHLORIDE: 104 MMOL/L (ref 98–110)
CO2 SERPL-SCNC: 28.5 MMOL/L (ref 22.8–31.6)
CREATININE: 1.11 MG/DL (ref 0.6–1.4)
EOSINOPHIL NFR BLD: 0.5 K/UL (ref 0–0.7)
EOSINOPHIL NFR BLD: 5 %
ERYTHROCYTE [DISTWIDTH] IN BLOOD BY AUTOMATED COUNT: 13.6 % (ref 11.7–14.9)
GLUCOSE: 99 MG/DL (ref 70–99)
GRAN #: 5.1 K/UL (ref 1.4–6.5)
GRAN%: 57.3 %
HCT VFR BLD AUTO: 43.9 % (ref 39–55)
HGB BLD-MCNC: 14.7 G/DL (ref 14–16)
IMMATURE GRANS (ABS): 0 K/UL (ref 0–1)
IMMATURE GRANULOCYTES: 0.3 %
LYMPH #: 2.8 K/UL (ref 1.2–3.4)
LYMPH%: 30.8 %
MCH RBC QN AUTO: 28.8 PG (ref 25–35)
MCHC RBC AUTO-ENTMCNC: 33.5 G/DL (ref 31–36)
MCV RBC AUTO: 85.9 FL (ref 80–100)
MONO #: 0.6 K/UL (ref 0.1–0.6)
MONO%: 6.3 %
NUCLEATED RBCS: 0 %
PLATELET # BLD AUTO: 283 K/UL (ref 140–440)
PMV BLD AUTO: 10.4 FL (ref 8.8–12.7)
POTASSIUM SERPL-SCNC: 3.8 MMOL/L (ref 3.5–5)
RBC # BLD AUTO: 5.11 M/UL (ref 4.3–5.9)
SODIUM: 142 MMOL/L (ref 134–144)
URATE SERPL-MCNC: 5.5 MG/DL (ref 2.6–7.8)
WBC # BLD AUTO: 8.9 K/UL (ref 5–10)

## 2019-05-16 PROCEDURE — 99213 PR OFFICE/OUTPT VISIT, EST, LEVL III, 20-29 MIN: ICD-10-PCS | Mod: ,,, | Performed by: INTERNAL MEDICINE

## 2019-05-16 PROCEDURE — 99213 OFFICE O/P EST LOW 20 MIN: CPT | Mod: ,,, | Performed by: INTERNAL MEDICINE

## 2019-05-16 RX ORDER — SULINDAC 200 MG/1
TABLET ORAL
Qty: 60 TABLET | Refills: 5 | Status: SHIPPED | OUTPATIENT
Start: 2019-05-16 | End: 2020-10-08

## 2019-05-16 NOTE — PROGRESS NOTES
[facility] RHEUMATOLOGY           Follow-up visit    Notes dictated via Dragon to EPIC. Please forgive any unintended errors.  Subjective:       Patient ID:   NAME: Shayne Huerta Jr. : 1970     48 y.o. male    Referring Doc: No ref. provider found  Other Physicians:    Chief Complaint:  Gout      HPI/Interval History:   The patient is doing well. He has not had any episodes of gout since his prior visit.          ROS:   GEN:    no fever, night sweats or weight loss  SKIN:   no rashes , erythema, bruising, or swelling, no Raynauds, no photosensitivity  HEENT: no changes in vision, no mouth ulcers, no sicca symptoms, no scalp tenderness, no jaw claudication.  CV:      no CP, PND, AGRAWAL or orthopnea, no palpitations  PULM: no SOB, cough, hemoptysis, sputum or pleuritic pain  GI:       no GERD, no dysphagia, no abdominal pain, nausea, vomiting, constipation, diarrhea, melanotic stools, BRBPR, or hematemesis  :      no hematuria, dysuria  NEURO: no paresthesias, headaches, acute visual disturbances  MUSCULOSKELETAL:  No muscle or joint complaints  PSYCH:   No insomnia, no increased anxiety or depression    Medications:    Current Outpatient Medications:     allopurinol (ZYLOPRIM) 100 MG tablet, Take 1 tablet (100 mg total) by mouth once daily., Disp: 30 tablet, Rfl: 5    allopurinol (ZYLOPRIM) 300 MG tablet, TAKE 1 TABLET(300 MG) BY MOUTH EVERY DAY, Disp: 30 tablet, Rfl: 5    sulindac (CLINORIL) 200 MG Tab, TAKE 1 TABLET(200 MG) BY MOUTH TWICE DAILY, Disp: 60 tablet, Rfl: 5      FAMILY HISTORY: negative for Connective Tissue Disease        Review of patient's allergies indicates:  No Known Allergies          Objective:     Vitals:  Blood pressure (!) 142/86, weight 111.6 kg (246 lb).    Physical Examination:   GEN: wn/wd male in no apparent distress  SKIN: no rashes, no sclerodactyly, no Raynaud's, no periungual erythema, no digital tip ulcerations, no nailbed pitting  HEAD: no alopecia, no scalp tenderness,  no temporal artery tenderness or induration.  EYES: no pallor, no icterus, PERRLA  ENT:  no thrush, no mucosal dryness or ulcerations, adequate oral hygiene & dentition.  NECK: supple x 6, no masses, no thyromegaly, no lymphadenopathy.  CV:   S1 and S2 regular, no murmurs, gallop or rubs  CHEST: Normal respiratory effort;  normal breath sounds/no adventitious sounds. No signs of consolidation.  ABD: non-tender and non-distended; soft; normal bowel sounds; no rebound/guarding or tenderness. No hepatosplenomegaly.  Musculoskeletal:  No evidence of active inflammatory arthritis. No peripheral tophi  EXTREM: no clubbing, cyanosis or edema. normal pulses.  NEURO:  grossly intact; motor/sensory WNL; no tremors  PSYCH:  normal mood, affect and behavior            Labs:   Lab Results   Component Value Date    WBC 8.9 05/16/2019    HGB 14.7 05/16/2019    HCT 43.9 05/16/2019    MCV 85.9 05/16/2019     05/16/2019   CMP@  Sodium   Date Value Ref Range Status   05/16/2019 142 134 - 144 mmol/L      Potassium   Date Value Ref Range Status   05/16/2019 3.8 3.5 - 5.0 mmol/L      Chloride   Date Value Ref Range Status   05/16/2019 104 98 - 110 mmol/L      CO2   Date Value Ref Range Status   05/16/2019 28.5 22.8 - 31.6 mmol/L      Glucose   Date Value Ref Range Status   05/16/2019 99 70 - 99 mg/dL      BUN, Bld   Date Value Ref Range Status   05/16/2019 13 8 - 20 mg/dL      Creatinine   Date Value Ref Range Status   05/16/2019 1.11 0.60 - 1.40 mg/dL      Calcium   Date Value Ref Range Status   05/16/2019 9.4 7.7 - 10.4 mg/dL      Total Protein   Date Value Ref Range Status   02/07/2018 7.2 6.0 - 8.2 g/dL      Albumin   Date Value Ref Range Status   02/07/2018 4.4 3.1 - 4.7 g/dL      Total Bilirubin   Date Value Ref Range Status   02/07/2018 0.8 0.3 - 1.0 mg/dL      Alkaline Phosphatase   Date Value Ref Range Status   02/07/2018 83 40 - 104 IU/L      AST   Date Value Ref Range Status   02/07/2018 15 10 - 40 IU/L      CRP   Date  Value Ref Range Status   02/07/2018 1.19 0.00 - 1.40 mg/dL      Uric Acid   Date Value Ref Range Status   05/16/2019 5.5 2.6 - 7.8 mg/dL          Radiology/Diagnostic Studies:    None    Assessment/Discussion/Plan:   48 y.o. male with gouty arthritis-stable on allopurinol 400 mg daily      PLAN:  I will continue his medication without change. Routine blood testing was obtained.      RTC:  I will see him back in 4 months      Electronically signed by Wong Pagan MD

## 2019-07-03 DIAGNOSIS — M10.00 IDIOPATHIC GOUT, UNSPECIFIED CHRONICITY, UNSPECIFIED SITE: ICD-10-CM

## 2019-07-03 DIAGNOSIS — M10.9 GOUT, UNSPECIFIED CAUSE, UNSPECIFIED CHRONICITY, UNSPECIFIED SITE: ICD-10-CM

## 2019-07-03 DIAGNOSIS — E79.0 HYPERURICEMIA: ICD-10-CM

## 2019-07-03 RX ORDER — ALLOPURINOL 300 MG/1
TABLET ORAL
Qty: 30 TABLET | Refills: 5 | Status: SHIPPED | OUTPATIENT
Start: 2019-07-03 | End: 2020-10-08

## 2019-07-03 RX ORDER — ALLOPURINOL 100 MG/1
TABLET ORAL
Qty: 30 TABLET | Refills: 5 | Status: SHIPPED | OUTPATIENT
Start: 2019-07-03 | End: 2020-10-08

## 2019-08-27 ENCOUNTER — OCCUPATIONAL HEALTH (OUTPATIENT)
Dept: URGENT CARE | Facility: CLINIC | Age: 49
End: 2019-08-27

## 2019-08-27 PROCEDURE — 80305 PR DRUG SCREEN - 1: ICD-10-PCS | Mod: S$GLB,,, | Performed by: NURSE PRACTITIONER

## 2019-08-27 PROCEDURE — 80305 DRUG TEST PRSMV DIR OPT OBS: CPT | Mod: S$GLB,,, | Performed by: NURSE PRACTITIONER

## 2019-09-12 ENCOUNTER — OFFICE VISIT (OUTPATIENT)
Dept: RHEUMATOLOGY | Facility: CLINIC | Age: 49
End: 2019-09-12
Payer: COMMERCIAL

## 2019-09-12 VITALS
BODY MASS INDEX: 34.59 KG/M2 | SYSTOLIC BLOOD PRESSURE: 126 MMHG | DIASTOLIC BLOOD PRESSURE: 79 MMHG | WEIGHT: 241.13 LBS

## 2019-09-12 DIAGNOSIS — M10.9 GOUT, UNSPECIFIED CAUSE, UNSPECIFIED CHRONICITY, UNSPECIFIED SITE: Primary | ICD-10-CM

## 2019-09-12 DIAGNOSIS — Z79.899 ENCOUNTER FOR LONG-TERM (CURRENT) USE OF MEDICATIONS: ICD-10-CM

## 2019-09-12 PROCEDURE — 99213 PR OFFICE/OUTPT VISIT, EST, LEVL III, 20-29 MIN: ICD-10-PCS | Mod: S$GLB,,, | Performed by: INTERNAL MEDICINE

## 2019-09-12 PROCEDURE — 99213 OFFICE O/P EST LOW 20 MIN: CPT | Mod: S$GLB,,, | Performed by: INTERNAL MEDICINE

## 2019-09-12 PROCEDURE — 3008F BODY MASS INDEX DOCD: CPT | Mod: S$GLB,,, | Performed by: INTERNAL MEDICINE

## 2019-09-12 PROCEDURE — 3008F PR BODY MASS INDEX (BMI) DOCUMENTED: ICD-10-PCS | Mod: S$GLB,,, | Performed by: INTERNAL MEDICINE

## 2019-09-12 NOTE — PROGRESS NOTES
[facility] RHEUMATOLOGY           Follow-up visit    Notes dictated via Dragon to EPIC. Please forgive any unintended errors.  Subjective:       Patient ID:   NAME: Shayne Huerta Jr. : 1970     49 y.o. male    Referring Doc: No ref. provider found  Other Physicians:    Chief Complaint:  Gout      HPI/Interval History:  The patient is doing well.  He has had no episodes of gout since his prior visit.  ROS:   GEN:    no fever, night sweats or weight loss  SKIN:   no rashes, erythema, bruising, or swelling, no Raynauds, no photosensitivity  HEENT: no changes in vision, no mouth ulcers, no sicca symptoms, no scalp tenderness, no jaw claudication.  CV:      no CP, PND, AGRAWAL, orthopnea, no palpitations  PULM: no SOB, cough, hemoptysis, sputum or pleuritic pain  GI:       no GERD, no dysphagia, no hematemesis, no abdominal pain, nausea, vomiting, constipation, diarrhea, melanotic stools, BRBPR  :      no hematuria, dysuria  NEURO: no paresthesias, headaches, acute visual disturbances  MUSCULOSKELETAL:  No muscle or joint pain  PSYCH:   No insomnia, no increased anxiety, no increased depression    Past Medical/Surgical History:  History reviewed. No pertinent past medical history.  Past Surgical History:   Procedure Laterality Date    FINGER SURGERY      finger amputation    KNEE SURGERY Left     ROTATOR CUFF REPAIR         Allergies:  Review of patient's allergies indicates:  No Known Allergies    Social/Family History:  Social History     Socioeconomic History    Marital status:      Spouse name: Not on file    Number of children: Not on file    Years of education: Not on file    Highest education level: Not on file   Occupational History    Not on file   Social Needs    Financial resource strain: Not on file    Food insecurity:     Worry: Not on file     Inability: Not on file    Transportation needs:     Medical: Not on file     Non-medical: Not on file   Tobacco Use    Smoking status:  Former Smoker     Packs/day: 1.00     Years: 15.00     Pack years: 15.00     Types: Cigarettes     Last attempt to quit: 2011     Years since quittin.6    Smokeless tobacco: Never Used   Substance and Sexual Activity    Alcohol use: No    Drug use: No    Sexual activity: Yes   Lifestyle    Physical activity:     Days per week: Not on file     Minutes per session: Not on file    Stress: Not on file   Relationships    Social connections:     Talks on phone: Not on file     Gets together: Not on file     Attends Sikh service: Not on file     Active member of club or organization: Not on file     Attends meetings of clubs or organizations: Not on file     Relationship status: Not on file   Other Topics Concern    Not on file   Social History Narrative    Not on file     Family History   Problem Relation Age of Onset    Cancer Father         Lung Ca     FAMILY HISTORY: negative for Connective Tissue Disease      Medications:    Current Outpatient Medications:     allopurinol (ZYLOPRIM) 100 MG tablet, TAKE 1 TABLET(100 MG) BY MOUTH EVERY DAY, Disp: 30 tablet, Rfl: 5    allopurinol (ZYLOPRIM) 300 MG tablet, TAKE 1 TABLET(300 MG) BY MOUTH EVERY DAY, Disp: 30 tablet, Rfl: 5    sulindac (CLINORIL) 200 MG Tab, TAKE 1 TABLET(200 MG) BY MOUTH TWICE DAILY (Patient taking differently: as needed. TAKE 1 TABLET(200 MG) BY MOUTH TWICE DAILY), Disp: 60 tablet, Rfl: 5      Objective:     Vitals:  Blood pressure 126/79, weight 109.4 kg (241 lb 1.6 oz).    Physical Examination:   GEN: wn/wd male in no apparent distress  SKIN: no rashes, no sclerodactyly, no Raynaud's, no periungual erythema, no digital tip ulcerations, no nailbed pitting  HEAD: no alopecia, no scalp tenderness, no temporal artery tenderness or induration.  EYES: no pallor, no icterus, PERRLA  ENT:  no thrush, no mucosal dryness or ulcerations, adequate oral hygiene & dentition.  NECK: supple x 6, no masses, no thyromegaly, no  lymphadenopathy.  CV:   S1 and S2 regular, no murmurs, gallop or rubs  CHEST: Normal respiratory effort;  normal breath sounds/no adventitious sounds. No signs of consolidation.  ABD: non-tender and non-distended; soft; normal bowel sounds; no rebound/guarding or tenderness. No hepatosplenomegaly.  Musculoskeletal:  No evidence of active inflammatory arthritis.  No progressive deformity.  No peripheral tophi  EXTREM: no clubbing, cyanosis or edema. normal pulses.  NEURO:  grossly intact; motor/sensory WNL; no tremors  PSYCH:  normal mood, affect and behavior    Labs:   Lab Results   Component Value Date    WBC 8.9 05/16/2019    HGB 14.7 05/16/2019    HCT 43.9 05/16/2019    MCV 85.9 05/16/2019     05/16/2019   CMP@  Sodium   Date Value Ref Range Status   05/16/2019 142 134 - 144 mmol/L      Potassium   Date Value Ref Range Status   05/16/2019 3.8 3.5 - 5.0 mmol/L      Chloride   Date Value Ref Range Status   05/16/2019 104 98 - 110 mmol/L      CO2   Date Value Ref Range Status   05/16/2019 28.5 22.8 - 31.6 mmol/L      Glucose   Date Value Ref Range Status   05/16/2019 99 70 - 99 mg/dL      BUN, Bld   Date Value Ref Range Status   05/16/2019 13 8 - 20 mg/dL      Creatinine   Date Value Ref Range Status   05/16/2019 1.11 0.60 - 1.40 mg/dL      Calcium   Date Value Ref Range Status   05/16/2019 9.4 7.7 - 10.4 mg/dL      Total Protein   Date Value Ref Range Status   02/07/2018 7.2 6.0 - 8.2 g/dL      Albumin   Date Value Ref Range Status   02/07/2018 4.4 3.1 - 4.7 g/dL      Total Bilirubin   Date Value Ref Range Status   02/07/2018 0.8 0.3 - 1.0 mg/dL      Alkaline Phosphatase   Date Value Ref Range Status   02/07/2018 83 40 - 104 IU/L      AST   Date Value Ref Range Status   02/07/2018 15 10 - 40 IU/L      CRP   Date Value Ref Range Status   02/07/2018 1.19 0.00 - 1.40 mg/dL      Uric Acid   Date Value Ref Range Status   05/16/2019 5.5 2.6 - 7.8 mg/dL        Radiology/Diagnostic  Studies:    None    Assessment/Discussion/Plan:   49 y.o. male with gouty arthritis-good control on allopurinol 400 mg weekly and sulindac as needed    PLAN:  I will continue his medication unchanged.  Routine blood testing was ordered.    RTC:  I will see him back in 4-6 months.    Electronically signed by Wong Pagan MD

## 2019-10-28 ENCOUNTER — CLINICAL SUPPORT (OUTPATIENT)
Dept: URGENT CARE | Facility: CLINIC | Age: 49
End: 2019-10-28

## 2019-10-28 PROCEDURE — 99499 PR PHYSICAL - DOT/CDL: ICD-10-PCS | Mod: S$GLB,,, | Performed by: EMERGENCY MEDICINE

## 2019-10-28 PROCEDURE — 99499 UNLISTED E&M SERVICE: CPT | Mod: S$GLB,,, | Performed by: EMERGENCY MEDICINE

## 2019-12-08 ENCOUNTER — HOSPITAL ENCOUNTER (EMERGENCY)
Facility: HOSPITAL | Age: 49
Discharge: HOME | End: 2019-12-08
Attending: EMERGENCY MEDICINE
Payer: OTHER GOVERNMENT

## 2019-12-08 VITALS
WEIGHT: 235 LBS | TEMPERATURE: 98 F | SYSTOLIC BLOOD PRESSURE: 145 MMHG | OXYGEN SATURATION: 98 % | RESPIRATION RATE: 16 BRPM | BODY MASS INDEX: 33.64 KG/M2 | DIASTOLIC BLOOD PRESSURE: 85 MMHG | HEART RATE: 58 BPM | HEIGHT: 70 IN

## 2019-12-08 DIAGNOSIS — R06.02 SOB (SHORTNESS OF BREATH): ICD-10-CM

## 2019-12-08 DIAGNOSIS — M54.6 ACUTE BILATERAL THORACIC BACK PAIN: Primary | ICD-10-CM

## 2019-12-08 DIAGNOSIS — R07.9 CHEST PAIN: ICD-10-CM

## 2019-12-08 DIAGNOSIS — M54.9 BACK PAIN: ICD-10-CM

## 2019-12-08 PROCEDURE — 96372 THER/PROPH/DIAG INJ SC/IM: CPT

## 2019-12-08 PROCEDURE — 63600175 PHARM REV CODE 636 W HCPCS: Performed by: EMERGENCY MEDICINE

## 2019-12-08 PROCEDURE — 93005 ELECTROCARDIOGRAM TRACING: CPT

## 2019-12-08 PROCEDURE — 99284 EMERGENCY DEPT VISIT MOD MDM: CPT | Mod: 25

## 2019-12-08 RX ORDER — KETOROLAC TROMETHAMINE 30 MG/ML
10 INJECTION, SOLUTION INTRAMUSCULAR; INTRAVENOUS
Status: COMPLETED | OUTPATIENT
Start: 2019-12-08 | End: 2019-12-08

## 2019-12-08 RX ORDER — METHYLPREDNISOLONE 4 MG/1
TABLET ORAL
Qty: 1 PACKAGE | Refills: 0 | Status: SHIPPED | OUTPATIENT
Start: 2019-12-08 | End: 2019-12-29

## 2019-12-08 RX ORDER — IBUPROFEN 600 MG/1
600 TABLET ORAL EVERY 6 HOURS PRN
Qty: 20 TABLET | Refills: 0 | Status: SHIPPED | OUTPATIENT
Start: 2019-12-08 | End: 2020-10-08

## 2019-12-08 RX ADMIN — KETOROLAC TROMETHAMINE 10 MG: 30 INJECTION, SOLUTION INTRAMUSCULAR at 05:12

## 2019-12-08 NOTE — ED PROVIDER NOTES
Encounter Date: 2019       History     Chief Complaint   Patient presents with    Back Pain     Nestor upper back pain with associated SOB     HPI patient is a 49-year-old  who presents emergency department complaining bilateral upper back pain is worse with movement, laying down, deep inspiration.  He began feeling the pain this evening while in bed.  He has had this before but not this severe.  His son attempted the I just his back but he did not experience improvement.  The pain was worse with his son cracking his back. No Chest pain, cough, fever, numbness, or weakness.  Review of patient's allergies indicates:  No Known Allergies  History reviewed. No pertinent past medical history.  Past Surgical History:   Procedure Laterality Date    FINGER SURGERY      finger amputation    KNEE SURGERY Left     ROTATOR CUFF REPAIR       Family History   Problem Relation Age of Onset    Cancer Father         Lung Ca     Social History     Tobacco Use    Smoking status: Former Smoker     Packs/day: 1.00     Years: 15.00     Pack years: 15.00     Types: Cigarettes     Last attempt to quit: 2011     Years since quittin.8    Smokeless tobacco: Never Used   Substance Use Topics    Alcohol use: No    Drug use: No     Review of Systems  REVIEW OF SYSTEMS  CONSTITUTIONAL: Negative for fever.  HEENT:  Negative for sore throat.   HEART:   Negative for chest pain.  LUNG:  Positive for shortness of breath only with deep inspiration from the pain in his back  ABDOMEN:  Negative for nausea.   :  No discharge, dysuria  EXTREMITIES:  No swelling.  Positive for intermittent midback pain. NEURO:  Negative for weakness.   SKIN:  Negative for rash.  Psych: No depression  HEME: Does not bruise/bleed easily.           Physical Exam     Initial Vitals [19 0406]   BP Pulse Resp Temp SpO2   (!) 145/85 (!) 58 16 97.5 °F (36.4 °C) 98 %      MAP       --         Physical Exam    Constitutional: He appears  well-developed and well-nourished.  Non-toxic appearance. No distress.   HENT:   Head: Normocephalic and atraumatic.   Eyes: EOM are normal. Pupils are equal, round, and reactive to light.   Neck: Normal range of motion. Neck supple. No neck rigidity. No JVD present.   Cardiovascular: Normal rate, regular rhythm, normal heart sounds and intact distal pulses. Exam reveals no gallop and no friction rub.    No murmur heard.  Pulmonary/Chest: Breath sounds normal. He has no wheezes. He has no rhonchi. He has no rales.   Abdominal: Soft. Bowel sounds are normal. He exhibits no distension. There is no tenderness. There is no rebound and no guarding.   Musculoskeletal: Normal range of motion.        Thoracic back: He exhibits tenderness.   Neurological: He is alert and oriented to person, place, and time. He has normal strength and normal reflexes. No cranial nerve deficit or sensory deficit. He exhibits normal muscle tone. Coordination normal. GCS eye subscore is 4. GCS verbal subscore is 5. GCS motor subscore is 6.   Skin: Skin is warm and dry.   Psychiatric: He has a normal mood and affect. His speech is normal and behavior is normal. He is not actively hallucinating.         ED Course   Procedures  Labs Reviewed - No data to display  EKG Readings: (Independently Interpreted)   Initial Reading: No STEMI. Rhythm: Sinus Bradycardia. Heart Rate: 53. Ectopy: No Ectopy. Conduction: Normal. ST Segments: Normal ST Segments. T Waves: Normal. Clinical Impression: Normal Sinus Rhythm       Imaging Results          X-Ray Thoracic Spine AP Lateral (Final result)  Result time 12/08/19 05:59:47    Final result by Cody Serrano MD (12/08/19 05:59:47)                 Impression:      As above.      Electronically signed by: Cody Serrano MD  Date:    12/08/2019  Time:    05:59             Narrative:    EXAMINATION:  XR THORACIC SPINE AP LATERAL    CLINICAL HISTORY:  Dorsalgia, unspecified    TECHNIQUE:  AP, lateral and  swimmer's views of the thoracic spine were obtained.    COMPARISON:  None.    FINDINGS:  Bone density is normal.  The thoracic vertebral bodies maintain normal height and alignment without significant disc space narrowing.  The paraspinous soft tissues are normal.                               X-Ray Chest PA And Lateral (Final result)  Result time 12/08/19 06:00:36    Final result by Cody Serrano MD (12/08/19 06:00:36)                 Impression:      1.  There is no acute cardiopulmonary abnormality.      Electronically signed by: Cody Serrano MD  Date:    12/08/2019  Time:    06:00             Narrative:    EXAMINATION:  XR CHEST PA AND LATERAL    TECHNIQUE:  PA and lateral views of the chest were performed.    COMPARISON:  Chest x-ray dated 12/04/2017    FINDINGS:  There is no acute abnormality or change in the appearance of the chest compared to the prior study.  The lungs are expanded and clear.  There is no mass or infiltrate.  Heart size, mediastinal contour and pulmonary vascularity are normal.  There is no pleural effusion.  There is no pneumothorax.                                 Medical Decision Making:   Initial Assessment:   49-year-old man who presents with midthoracic back pain which is worse with lying down flat, movement, deep inspiration and palpation. No fever or chest pain. He does endorse associated shortness of breath only with the pain from deep inspiration.  EKG is performed shows sinus bradycardia without ischemic changes.  Chest x-ray and thoracic x-ray were performed showed no acute abnormalities.  I highly suspect musculoskeletal etiology.  I do not think troponin testing is warranted.  I have very low suspicion for ACS, pulmonary embolism, aortic dissection.  Patient received Toradol injection with significant improvement.  I will treat with steroids and ibuprofen.  Suggest rest and chiropractic follow-up.  Return precautions discussed for worsening symptoms, chest pain,  fatigue or any other concerns.  Patient is discharged improved in no acute distress.                                 Clinical Impression:       ICD-10-CM ICD-9-CM   1. Acute bilateral thoracic back pain M54.6 724.1   2. Chest pain R07.9 786.50   3. SOB (shortness of breath) R06.02 786.05   4. Back pain M54.9 724.5                             Arnold Ward MD  12/08/19 0652

## 2020-10-08 ENCOUNTER — LAB VISIT (OUTPATIENT)
Dept: LAB | Facility: HOSPITAL | Age: 50
End: 2020-10-08
Attending: NURSE PRACTITIONER
Payer: OTHER GOVERNMENT

## 2020-10-08 ENCOUNTER — OFFICE VISIT (OUTPATIENT)
Dept: FAMILY MEDICINE | Facility: CLINIC | Age: 50
End: 2020-10-08
Payer: OTHER GOVERNMENT

## 2020-10-08 VITALS
HEIGHT: 70 IN | HEART RATE: 70 BPM | SYSTOLIC BLOOD PRESSURE: 130 MMHG | WEIGHT: 244.06 LBS | TEMPERATURE: 98 F | OXYGEN SATURATION: 98 % | BODY MASS INDEX: 34.94 KG/M2 | DIASTOLIC BLOOD PRESSURE: 82 MMHG

## 2020-10-08 DIAGNOSIS — R53.1 WEAKNESS: ICD-10-CM

## 2020-10-08 DIAGNOSIS — G62.9 NEUROPATHY: ICD-10-CM

## 2020-10-08 DIAGNOSIS — Z00.00 HEALTHCARE MAINTENANCE: ICD-10-CM

## 2020-10-08 DIAGNOSIS — R55 SYNCOPE AND COLLAPSE: ICD-10-CM

## 2020-10-08 DIAGNOSIS — R51.9 NONINTRACTABLE EPISODIC HEADACHE, UNSPECIFIED HEADACHE TYPE: Primary | ICD-10-CM

## 2020-10-08 DIAGNOSIS — R51.9 NONINTRACTABLE EPISODIC HEADACHE, UNSPECIFIED HEADACHE TYPE: ICD-10-CM

## 2020-10-08 DIAGNOSIS — R42 DIZZINESS: ICD-10-CM

## 2020-10-08 LAB
ALBUMIN SERPL BCP-MCNC: 4.8 G/DL (ref 3.5–5.2)
ALP SERPL-CCNC: 78 U/L (ref 55–135)
ALT SERPL W/O P-5'-P-CCNC: 71 U/L (ref 10–44)
ANION GAP SERPL CALC-SCNC: 10 MMOL/L (ref 8–16)
AST SERPL-CCNC: 27 U/L (ref 10–40)
BASOPHILS # BLD AUTO: 0.04 K/UL (ref 0–0.2)
BASOPHILS NFR BLD: 0.5 % (ref 0–1.9)
BILIRUB SERPL-MCNC: 0.6 MG/DL (ref 0.1–1)
BUN SERPL-MCNC: 12 MG/DL (ref 6–20)
CALCIUM SERPL-MCNC: 9.7 MG/DL (ref 8.7–10.5)
CHLORIDE SERPL-SCNC: 104 MMOL/L (ref 95–110)
CHOLEST SERPL-MCNC: 237 MG/DL (ref 120–199)
CHOLEST/HDLC SERPL: 6.1 {RATIO} (ref 2–5)
CO2 SERPL-SCNC: 25 MMOL/L (ref 23–29)
CREAT SERPL-MCNC: 1 MG/DL (ref 0.5–1.4)
DIFFERENTIAL METHOD: NORMAL
EOSINOPHIL # BLD AUTO: 0.3 K/UL (ref 0–0.5)
EOSINOPHIL NFR BLD: 4.2 % (ref 0–8)
ERYTHROCYTE [DISTWIDTH] IN BLOOD BY AUTOMATED COUNT: 12.9 % (ref 11.5–14.5)
EST. GFR  (AFRICAN AMERICAN): >60 ML/MIN/1.73 M^2
EST. GFR  (NON AFRICAN AMERICAN): >60 ML/MIN/1.73 M^2
ESTIMATED AVG GLUCOSE: 111 MG/DL (ref 68–131)
FERRITIN SERPL-MCNC: 299 NG/ML (ref 20–300)
GLUCOSE SERPL-MCNC: 84 MG/DL (ref 70–110)
HBA1C MFR BLD HPLC: 5.5 % (ref 4–5.6)
HCT VFR BLD AUTO: 52.3 % (ref 40–54)
HDLC SERPL-MCNC: 39 MG/DL (ref 40–75)
HDLC SERPL: 16.5 % (ref 20–50)
HGB BLD-MCNC: 17.4 G/DL (ref 14–18)
IMM GRANULOCYTES # BLD AUTO: 0.03 K/UL (ref 0–0.04)
IMM GRANULOCYTES NFR BLD AUTO: 0.4 % (ref 0–0.5)
IRON SERPL-MCNC: 124 UG/DL (ref 45–160)
IRON SERPL-MCNC: 124 UG/DL (ref 45–160)
LDLC SERPL CALC-MCNC: 156.6 MG/DL (ref 63–159)
LYMPHOCYTES # BLD AUTO: 2.2 K/UL (ref 1–4.8)
LYMPHOCYTES NFR BLD: 27.6 % (ref 18–48)
MCH RBC QN AUTO: 28.4 PG (ref 27–31)
MCHC RBC AUTO-ENTMCNC: 33.3 G/DL (ref 32–36)
MCV RBC AUTO: 85 FL (ref 82–98)
MONOCYTES # BLD AUTO: 0.4 K/UL (ref 0.3–1)
MONOCYTES NFR BLD: 5.3 % (ref 4–15)
NEUTROPHILS # BLD AUTO: 4.9 K/UL (ref 1.8–7.7)
NEUTROPHILS NFR BLD: 62 % (ref 38–73)
NONHDLC SERPL-MCNC: 198 MG/DL
NRBC BLD-RTO: 0 /100 WBC
PLATELET # BLD AUTO: 254 K/UL (ref 150–350)
PMV BLD AUTO: 11.8 FL (ref 9.2–12.9)
POTASSIUM SERPL-SCNC: 4.2 MMOL/L (ref 3.5–5.1)
PROT SERPL-MCNC: 7.8 G/DL (ref 6–8.4)
RBC # BLD AUTO: 6.13 M/UL (ref 4.6–6.2)
RETICS/RBC NFR AUTO: 2 % (ref 0.4–2)
SATURATED IRON: 28 % (ref 20–50)
SODIUM SERPL-SCNC: 139 MMOL/L (ref 136–145)
TOTAL IRON BINDING CAPACITY: 447 UG/DL (ref 250–450)
TRANSFERRIN SERPL-MCNC: 302 MG/DL (ref 200–375)
TRIGL SERPL-MCNC: 207 MG/DL (ref 30–150)
TSH SERPL DL<=0.005 MIU/L-ACNC: 1.77 UIU/ML (ref 0.4–4)
WBC # BLD AUTO: 7.94 K/UL (ref 3.9–12.7)

## 2020-10-08 PROCEDURE — 84443 ASSAY THYROID STIM HORMONE: CPT

## 2020-10-08 PROCEDURE — 80053 COMPREHEN METABOLIC PANEL: CPT

## 2020-10-08 PROCEDURE — 86803 HEPATITIS C AB TEST: CPT

## 2020-10-08 PROCEDURE — 99204 PR OFFICE/OUTPT VISIT, NEW, LEVL IV, 45-59 MIN: ICD-10-PCS | Mod: S$GLB,,, | Performed by: NURSE PRACTITIONER

## 2020-10-08 PROCEDURE — 80061 LIPID PANEL: CPT

## 2020-10-08 PROCEDURE — 83540 ASSAY OF IRON: CPT

## 2020-10-08 PROCEDURE — 82746 ASSAY OF FOLIC ACID SERUM: CPT

## 2020-10-08 PROCEDURE — 83036 HEMOGLOBIN GLYCOSYLATED A1C: CPT

## 2020-10-08 PROCEDURE — 85045 AUTOMATED RETICULOCYTE COUNT: CPT

## 2020-10-08 PROCEDURE — 82728 ASSAY OF FERRITIN: CPT

## 2020-10-08 PROCEDURE — 85025 COMPLETE CBC W/AUTO DIFF WBC: CPT

## 2020-10-08 PROCEDURE — 82607 VITAMIN B-12: CPT

## 2020-10-08 PROCEDURE — 36415 COLL VENOUS BLD VENIPUNCTURE: CPT | Mod: PO

## 2020-10-08 PROCEDURE — 82306 VITAMIN D 25 HYDROXY: CPT

## 2020-10-08 PROCEDURE — 99204 OFFICE O/P NEW MOD 45 MIN: CPT | Mod: S$GLB,,, | Performed by: NURSE PRACTITIONER

## 2020-10-08 PROCEDURE — 99999 PR PBB SHADOW E&M-EST. PATIENT-LVL IV: ICD-10-PCS | Mod: PBBFAC,,, | Performed by: NURSE PRACTITIONER

## 2020-10-08 PROCEDURE — 99999 PR PBB SHADOW E&M-EST. PATIENT-LVL IV: CPT | Mod: PBBFAC,,, | Performed by: NURSE PRACTITIONER

## 2020-10-08 PROCEDURE — 86703 HIV-1/HIV-2 1 RESULT ANTBDY: CPT

## 2020-10-08 NOTE — PROGRESS NOTES
Patient ID: Shayne Huerta Jr. is a 50 y.o. male.    Chief Complaint:   No chief complaint on file.    HPI   Mr. Huerta presents to clinic reporting headaches, fatigue, dizziness, and bilateral upper extremity tingling for the past 4 to 5 days. He attributed his symptoms to elevated blood pressure however he was unable to assess his blood pressure at home. Blood pressure is normal in clinic today. The headache is described as mild and located at temples and behind eyes. He feels as if he is in a daze at times and believes he blacked out while taking a test for work. Treatment includes rest and ibuprofen with some relief. Patient is not currently being treated for any health concerns at this time.     Patient is new to me. Reviewed past medical and social history.    History reviewed. No pertinent past medical history.  Past Surgical History:   Procedure Laterality Date    FINGER SURGERY      finger amputation    KNEE SURGERY Left     ROTATOR CUFF REPAIR       No current outpatient medications on file prior to visit.     No current facility-administered medications on file prior to visit.        Review of Systems   Constitutional: Positive for fatigue. Negative for appetite change, chills and fever.   HENT: Positive for tinnitus. Negative for congestion.    Eyes: Positive for visual disturbance. Negative for pain, discharge, redness and itching.   Respiratory: Negative for shortness of breath.    Cardiovascular: Negative for chest pain and palpitations.   Gastrointestinal: Positive for nausea. Negative for diarrhea and vomiting.   Endocrine: Negative for polydipsia, polyphagia and polyuria.   Genitourinary: Negative for difficulty urinating.   Musculoskeletal: Negative for gait problem.   Skin: Negative for rash.   Neurological: Positive for dizziness, light-headedness, numbness and headaches. Negative for tremors, seizures, syncope, facial asymmetry, speech difficulty and weakness.   Psychiatric/Behavioral:  Positive for decreased concentration.   All other systems reviewed and are negative.      Objective:      Physical Exam  Vitals signs reviewed.   Constitutional:       Appearance: Normal appearance. He is well-developed.   HENT:      Head: Normocephalic and atraumatic.      Mouth/Throat:      Pharynx: No oropharyngeal exudate.   Eyes:      Conjunctiva/sclera: Conjunctivae normal.      Pupils: Pupils are equal, round, and reactive to light.   Neck:      Musculoskeletal: Normal range of motion.      Thyroid: No thyromegaly.      Vascular: No JVD.      Trachea: No tracheal deviation.   Cardiovascular:      Rate and Rhythm: Normal rate and regular rhythm.      Heart sounds: Normal heart sounds.   Pulmonary:      Effort: Pulmonary effort is normal.      Breath sounds: Normal breath sounds.   Abdominal:      General: Bowel sounds are normal. There is no distension.      Palpations: Abdomen is soft.      Tenderness: There is no abdominal tenderness.   Musculoskeletal: Normal range of motion.   Skin:     General: Skin is warm and dry.      Capillary Refill: Capillary refill takes less than 2 seconds.   Neurological:      General: No focal deficit present.      Mental Status: He is alert and oriented to person, place, and time.      GCS: GCS eye subscore is 4. GCS verbal subscore is 5. GCS motor subscore is 6.      Sensory: Sensation is intact.      Motor: Motor function is intact.      Gait: Gait is intact.   Psychiatric:         Mood and Affect: Mood normal.         Assessment:       1. Nonintractable episodic headache, unspecified headache type    2. Dizziness    3. Neuropathy    4. Healthcare maintenance    5. Weakness    6. Syncope and collapse        Plan:       Diagnoses and all orders for this visit:    Nonintractable episodic headache, unspecified headache type  -     CBC auto differential; Future  -     Comprehensive Metabolic Panel; Future  -     Lipid Panel; Future  -     TSH; Future  -     Vitamin D;  "Future    Dizziness  -     CBC auto differential; Future  -     Comprehensive Metabolic Panel; Future  -     Lipid Panel; Future  -     TSH; Future  -     Vitamin D; Future  -     Nuclear Stress - Cardiology Interpreted; Future    Neuropathy  -     CBC auto differential; Future  -     Comprehensive Metabolic Panel; Future  -     Lipid Panel; Future  -     TSH; Future  -     Vitamin D; Future    Healthcare maintenance  -     Hemoglobin A1C; Future  -     Hepatitis C Antibody; Future  -     HIV 1/2 Ag/Ab (4th Gen); Future  -     Ambulatory referral/consult to Optometry; Future    Weakness  -     Hemoglobin A1C; Future  -     Ferritin; Future  -     Iron and TIBC; Future  -     Iron; Future  -     Vitamin B12; Future  -     Reticulocytes; Future  -     Folate; Future  -     Nuclear Stress - Cardiology Interpreted; Future    Syncope and collapse  -     Nuclear Stress - Cardiology Interpreted; Future    Labs today. Will call with results and discuss further plan of care.  Patient education provided.  All questions and concerns addressed  RTC PRN and if symptoms worsen or fail to improve  Patient verbalizes understanding      Patient Instructions     Headache, Unspecified    A number of things can cause headaches. The cause of your headache isnt clear. But it doesnt seem to be a sign of any serious illness.  You could have a tension headache or a migraine headache.  Stress can cause a tension headache. This can happen if you tense the muscles of your shoulders, neck, and scalp without knowing it. If this stress lasts long enough, you may develop a tension headache.  It is not clear why migraines occur, but certain things called" triggers" can raise the risk of having a migraine attack. Migraine triggers may include emotional stress or depression, or by hormone changes during the menstrual cycle. Other triggers include birth control pills and other medicines, alcohol or caffeine, foods with tyramine (such as aged cheese, " wine), eyestrain, weather changes, missed meals, and lack of sleep or oversleeping.  Other causes of headache include:  · Viral illness with high fever  · Head injury with concussion  · Sinus, ear, or throat infection  · Dental pain and jaw joint (TMJ) pain  More serious but less common causes of headache include stroke, brain hemorrhage, brain tumor, meningitis, and encephalitis.  Home care  Follow these tips when taking care of yourself at home:  · Dont drive yourself home if you were given pain medicine for your headache. Instead, have someone else drive you home. Try to sleep when you get home. You should feel much better when you wake up.  · Apply heat to the back of your neck to ease a neck muscle spasm. Take care of a migraine headache by putting an ice pack on your forehead or at the base of your skull.  · If you have nausea or vomiting, eat a light diet until your headache eases.  · If you have a migraine headache, use sunglasses when in the daylight or around bright indoor lighting until your symptoms get better. Bright glaring light can make this type of headache worse.  Follow-up care  Follow up with your healthcare provider, or as advised. Talk with your provider if you have frequent headaches. He or she can help figure out a treatment plan. By knowing the earliest signs of headache, and starting treatment right away, you may be able to stop the pain yourself.  When to seek medical advice  Call your healthcare provider right away if any of these occur:  · Your head pain suddenly gets worse after sexual intercourse or strenuous activity  · Your head pain doesnt get better within 24 hours  · You arent able to keep liquids down (repeated vomiting)  · Fever of 100.4ºF (38ºC) or higher, or as directed by your healthcare provider  · Stiff neck  · Extreme drowsiness, confusion, or fainting  · Dizziness or dizziness with spinning sensation (vertigo)  · Weakness in an arm or leg or one side of your  face  · You have trouble talking or seeing  Date Last Reviewed: 8/1/2016  © 2678-7995 [employer]. [address] [place] [address], [place] [address]. All rights reserved. This information is not intended as a substitute for professional medical care. Always follow your healthcare professional's instructions.        Dizziness (Uncertain Cause)  Dizziness is a common symptom. It may be described as lightheadedness, spinning, or feeling like you are going to faint. Dizziness can have many causes.  Be sure to tell the healthcare provider about:  · All medicines you take, including prescription, over-the-counter, herbs, and supplements  · Any other symptoms you have  · Any health problems you are being treated for  · Anything that causes the dizziness to get worse or better  Today's exam did not show an exact cause for your dizziness. Other tests may be needed. Follow up with your healthcare provider.  Home care  · Dizziness that occurs with sudden standing may be a sign of mild dehydration. Drink extra fluids for the next few days.  · If you recently started a new medicine, stopped a medicine, or had the dose of a current medicine changed, talk with the prescribing healthcare provider. Your medicine plan may need adjustment.  · If dizziness lasts more than a few seconds, sit or lie down until it passes. This may help prevent injury in case you pass out.  · Do not drive or use power tools or dangerous equipment until you have had no dizziness for at least 48 hours.  Follow-up care  Follow up with your healthcare provider for further evaluation within the next 7 days or as advised.  When to seek medical advice  Call your healthcare provider for any of the following:  · Worsening of symptoms or new symptoms  · Passing out or seizure  · Repeated vomiting  · Headache  · Palpitations (the sense that your heart is fluttering or beating fast or hard)  · Shortness of breath  · Blood in vomit or stool (black or red  color)  · Weakness of an arm or leg or one side of the face  · Vision or hearing changes  · Trouble walking or speaking  · Chest, arm, neck, back, or jaw pain  Date Last Reviewed: 8/23/2015 © 2000-2017 [employer]. [address] [place] [id number]. All rights reserved. This information is not intended as a substitute for professional medical care. Always follow your healthcare professional's instructions.

## 2020-10-09 LAB
25(OH)D3+25(OH)D2 SERPL-MCNC: 26 NG/ML (ref 30–96)
FOLATE SERPL-MCNC: 10.6 NG/ML (ref 4–24)
VIT B12 SERPL-MCNC: 646 PG/ML (ref 210–950)

## 2020-10-09 NOTE — PATIENT INSTRUCTIONS
"  Headache, Unspecified    A number of things can cause headaches. The cause of your headache isnt clear. But it doesnt seem to be a sign of any serious illness.  You could have a tension headache or a migraine headache.  Stress can cause a tension headache. This can happen if you tense the muscles of your shoulders, neck, and scalp without knowing it. If this stress lasts long enough, you may develop a tension headache.  It is not clear why migraines occur, but certain things called" triggers" can raise the risk of having a migraine attack. Migraine triggers may include emotional stress or depression, or by hormone changes during the menstrual cycle. Other triggers include birth control pills and other medicines, alcohol or caffeine, foods with tyramine (such as aged cheese, wine), eyestrain, weather changes, missed meals, and lack of sleep or oversleeping.  Other causes of headache include:  · Viral illness with high fever  · Head injury with concussion  · Sinus, ear, or throat infection  · Dental pain and jaw joint (TMJ) pain  More serious but less common causes of headache include stroke, brain hemorrhage, brain tumor, meningitis, and encephalitis.  Home care  Follow these tips when taking care of yourself at home:  · Dont drive yourself home if you were given pain medicine for your headache. Instead, have someone else drive you home. Try to sleep when you get home. You should feel much better when you wake up.  · Apply heat to the back of your neck to ease a neck muscle spasm. Take care of a migraine headache by putting an ice pack on your forehead or at the base of your skull.  · If you have nausea or vomiting, eat a light diet until your headache eases.  · If you have a migraine headache, use sunglasses when in the daylight or around bright indoor lighting until your symptoms get better. Bright glaring light can make this type of headache worse.  Follow-up care  Follow up with your healthcare provider, or " as advised. Talk with your provider if you have frequent headaches. He or she can help figure out a treatment plan. By knowing the earliest signs of headache, and starting treatment right away, you may be able to stop the pain yourself.  When to seek medical advice  Call your healthcare provider right away if any of these occur:  · Your head pain suddenly gets worse after sexual intercourse or strenuous activity  · Your head pain doesnt get better within 24 hours  · You arent able to keep liquids down (repeated vomiting)  · Fever of 100.4ºF (38ºC) or higher, or as directed by your healthcare provider  · Stiff neck  · Extreme drowsiness, confusion, or fainting  · Dizziness or dizziness with spinning sensation (vertigo)  · Weakness in an arm or leg or one side of your face  · You have trouble talking or seeing  Date Last Reviewed: 8/1/2016  © 5693-1578 [employer]. [address] [place] [address]. All rights reserved. This information is not intended as a substitute for professional medical care. Always follow your healthcare professional's instructions.        Dizziness (Uncertain Cause)  Dizziness is a common symptom. It may be described as lightheadedness, spinning, or feeling like you are going to faint. Dizziness can have many causes.  Be sure to tell the healthcare provider about:  · All medicines you take, including prescription, over-the-counter, herbs, and supplements  · Any other symptoms you have  · Any health problems you are being treated for  · Anything that causes the dizziness to get worse or better  Today's exam did not show an exact cause for your dizziness. Other tests may be needed. Follow up with your healthcare provider.  Home care  · Dizziness that occurs with sudden standing may be a sign of mild dehydration. Drink extra fluids for the next few days.  · If you recently started a new medicine, stopped a medicine, or had the dose of a current medicine changed, talk with  the prescribing healthcare provider. Your medicine plan may need adjustment.  · If dizziness lasts more than a few seconds, sit or lie down until it passes. This may help prevent injury in case you pass out.  · Do not drive or use power tools or dangerous equipment until you have had no dizziness for at least 48 hours.  Follow-up care  Follow up with your healthcare provider for further evaluation within the next 7 days or as advised.  When to seek medical advice  Call your healthcare provider for any of the following:  · Worsening of symptoms or new symptoms  · Passing out or seizure  · Repeated vomiting  · Headache  · Palpitations (the sense that your heart is fluttering or beating fast or hard)  · Shortness of breath  · Blood in vomit or stool (black or red color)  · Weakness of an arm or leg or one side of the face  · Vision or hearing changes  · Trouble walking or speaking  · Chest, arm, neck, back, or jaw pain  Date Last Reviewed: 8/23/2015  © 7246-4112 [employer]. [address] [place] [address], [place] [address]. All rights reserved. This information is not intended as a substitute for professional medical care. Always follow your healthcare professional's instructions.

## 2020-10-10 LAB
HCV AB SERPL QL IA: NEGATIVE
HIV 1+2 AB+HIV1 P24 AG SERPL QL IA: NEGATIVE

## 2020-10-13 ENCOUNTER — TELEPHONE (OUTPATIENT)
Dept: FAMILY MEDICINE | Facility: CLINIC | Age: 50
End: 2020-10-13

## 2020-10-13 ENCOUNTER — PATIENT MESSAGE (OUTPATIENT)
Dept: RHEUMATOLOGY | Facility: CLINIC | Age: 50
End: 2020-10-13

## 2020-10-13 ENCOUNTER — PATIENT MESSAGE (OUTPATIENT)
Dept: FAMILY MEDICINE | Facility: CLINIC | Age: 50
End: 2020-10-13

## 2020-10-13 DIAGNOSIS — E55.9 VITAMIN D DEFICIENCY: Primary | ICD-10-CM

## 2020-10-13 RX ORDER — ERGOCALCIFEROL 1.25 MG/1
50000 CAPSULE ORAL
Qty: 12 CAPSULE | Refills: 0 | Status: SHIPPED | OUTPATIENT
Start: 2020-10-13 | End: 2021-01-04

## 2020-10-14 DIAGNOSIS — R55 SYNCOPE AND COLLAPSE: ICD-10-CM

## 2020-10-14 NOTE — TELEPHONE ENCOUNTER
----- Message from Leesa Arriaga MA sent at 10/14/2020 10:51 AM CDT -----  Regarding: FW: Stress test correction  Please read below  ----- Message -----  From: Adrianna Abdul  Sent: 10/14/2020  10:46 AM CDT  To: Leesa Arriaga MA  Subject: FW: Stress test correction                       Yes the orders need to say Radiologist interpreted and not cardiologist interpreted.  Thank you.  ----- Message -----  From: Leesa Arriaga MA  Sent: 10/14/2020   9:51 AM CDT  To: Adrianna Abdul  Subject: Stress test correction                           Gab bouchra Rodas,     In reference to the stress test ordered by PIPPA Adams. She was looking at the notes within the stress test that you left.  What did you need her to correct? She did not receive a message regarding this matter. Thank you.     REGGIE Pacheco

## 2020-10-20 ENCOUNTER — HOSPITAL ENCOUNTER (OUTPATIENT)
Dept: RADIOLOGY | Facility: HOSPITAL | Age: 50
Discharge: HOME | End: 2020-10-20
Attending: NURSE PRACTITIONER
Payer: OTHER GOVERNMENT

## 2020-10-20 ENCOUNTER — CLINICAL SUPPORT (OUTPATIENT)
Dept: CARDIOLOGY | Facility: HOSPITAL | Age: 50
End: 2020-10-20
Attending: NURSE PRACTITIONER
Payer: OTHER GOVERNMENT

## 2020-10-20 VITALS — BODY MASS INDEX: 35.49 KG/M2 | HEIGHT: 70 IN | WEIGHT: 247.88 LBS

## 2020-10-20 DIAGNOSIS — R55 SYNCOPE AND COLLAPSE: ICD-10-CM

## 2020-10-20 LAB
CV STRESS BASE HR: 60 BPM
DIASTOLIC BLOOD PRESSURE: 80 MMHG
OHS CV CPX 85 PERCENT MAX PREDICTED HEART RATE MALE: 145
OHS CV CPX ESTIMATED METS: 9.2
OHS CV CPX MAX PREDICTED HEART RATE: 171
OHS CV CPX PATIENT IS FEMALE: 0
OHS CV CPX PATIENT IS MALE: 1
OHS CV CPX PEAK DIASTOLIC BLOOD PRESSURE: 82 MMHG
OHS CV CPX PEAK HEAR RATE: 153 BPM
OHS CV CPX PEAK RATE PRESSURE PRODUCT: NORMAL
OHS CV CPX PEAK SYSTOLIC BLOOD PRESSURE: 243 MMHG
OHS CV CPX PERCENT MAX PREDICTED HEART RATE ACHIEVED: 89
OHS CV CPX RATE PRESSURE PRODUCT PRESENTING: 7740
STRESS ECHO POST EXERCISE DUR MIN: 7 MINUTES
STRESS ECHO POST EXERCISE DUR SEC: 14 SECONDS
SYSTOLIC BLOOD PRESSURE: 129 MMHG

## 2020-10-20 PROCEDURE — 93016 CV STRESS TEST SUPVJ ONLY: CPT | Mod: ,,, | Performed by: INTERNAL MEDICINE

## 2020-10-20 PROCEDURE — 93018 NUCLEAR STRESS TEST (CUPID ONLY): ICD-10-PCS | Mod: ,,, | Performed by: INTERNAL MEDICINE

## 2020-10-20 PROCEDURE — 93017 CV STRESS TEST TRACING ONLY: CPT

## 2020-10-20 PROCEDURE — A9502 TC99M TETROFOSMIN: HCPCS

## 2020-10-20 PROCEDURE — 93016 NUCLEAR STRESS TEST (CUPID ONLY): ICD-10-PCS | Mod: ,,, | Performed by: INTERNAL MEDICINE

## 2020-10-20 PROCEDURE — 93018 CV STRESS TEST I&R ONLY: CPT | Mod: ,,, | Performed by: INTERNAL MEDICINE

## 2020-10-23 ENCOUNTER — OFFICE VISIT (OUTPATIENT)
Dept: FAMILY MEDICINE | Facility: CLINIC | Age: 50
End: 2020-10-23
Payer: OTHER GOVERNMENT

## 2020-10-23 ENCOUNTER — TELEPHONE (OUTPATIENT)
Dept: FAMILY MEDICINE | Facility: CLINIC | Age: 50
End: 2020-10-23

## 2020-10-23 VITALS
BODY MASS INDEX: 35.42 KG/M2 | DIASTOLIC BLOOD PRESSURE: 84 MMHG | HEIGHT: 70 IN | OXYGEN SATURATION: 98 % | TEMPERATURE: 98 F | WEIGHT: 247.38 LBS | HEART RATE: 65 BPM | SYSTOLIC BLOOD PRESSURE: 124 MMHG | RESPIRATION RATE: 18 BRPM

## 2020-10-23 DIAGNOSIS — R55 SYNCOPE AND COLLAPSE: ICD-10-CM

## 2020-10-23 DIAGNOSIS — R55 SYNCOPE, UNSPECIFIED SYNCOPE TYPE: Primary | ICD-10-CM

## 2020-10-23 PROCEDURE — 99999 PR PBB SHADOW E&M-EST. PATIENT-LVL IV: ICD-10-PCS | Mod: PBBFAC,,, | Performed by: STUDENT IN AN ORGANIZED HEALTH CARE EDUCATION/TRAINING PROGRAM

## 2020-10-23 PROCEDURE — 99214 OFFICE O/P EST MOD 30 MIN: CPT | Mod: S$GLB,,, | Performed by: STUDENT IN AN ORGANIZED HEALTH CARE EDUCATION/TRAINING PROGRAM

## 2020-10-23 PROCEDURE — 99214 PR OFFICE/OUTPT VISIT, EST, LEVL IV, 30-39 MIN: ICD-10-PCS | Mod: S$GLB,,, | Performed by: STUDENT IN AN ORGANIZED HEALTH CARE EDUCATION/TRAINING PROGRAM

## 2020-10-23 PROCEDURE — 99999 PR PBB SHADOW E&M-EST. PATIENT-LVL IV: CPT | Mod: PBBFAC,,, | Performed by: STUDENT IN AN ORGANIZED HEALTH CARE EDUCATION/TRAINING PROGRAM

## 2020-10-23 NOTE — TELEPHONE ENCOUNTER
Attempted to contact pt , unable to leave voicemail as pt does not have a voicemail box setup.        ----- Message from Leonor Mondragon sent at 10/23/2020 10:14 AM CDT -----  Who Called: CORINE HARRIS JR    What is the reqeust in detail: Pt states he may be late for his appt today, doesn't know how late, but he's on his way. Please advise.     Can the clinic reply by [facility]?: No    Best Call Back Number: [phone]

## 2020-10-23 NOTE — PROGRESS NOTES
[facility] MEDICINE CLINIC NOTE    Patient Name: Shayne Huerta Jr.  YOB: 1970    PRESENTING HISTORY   Chief Complaint:   Chief Complaint   Patient presents with    \[facility] [place] [facility]\"" Care      New patient to me  History of Present Illness:  Mr. Shayne Huerta Jr. is a 50 y.o. male here for follow up of fainting episode.     Has recently been under significant stress at new job at [employer]. He is suspicious symptoms might be related.      Blacked out a month ago.   Went into a building, had several conversations he does not remember. Took a test which he did terrible on.   Came out, was sitting down when he lost consciousness. Woke up unknown amount of time later.   Prior to onset he had headaches, mostly behind the eyes for 5-6 days.   No chest pain, palpitations, shortness of breath.     Seen in clinic for this and underwent blood test and stress test.   Stress test negative but blood pressure zayra to 243 systolic during test.     Subsequent episode about a week later. Also occurred at rest, not with positional change.     Has had some tingling in fingertips but no obvious motor or sensory deficits.     No recent illness.     Review of Systems   Constitutional: Negative for chills and fever.   Eyes: Positive for photophobia (chronic). Negative for blurred vision.   Respiratory: Negative for shortness of breath.    Cardiovascular: Negative for chest pain and palpitations.   Gastrointestinal: Negative for abdominal pain, nausea and vomiting.   Neurological: Positive for headaches. Negative for dizziness.         PAST HISTORY:   History reviewed. No pertinent past medical history.    Past Surgical History:   Procedure Laterality Date    FINGER SURGERY      finger amputation    KNEE SURGERY Left     ROTATOR CUFF REPAIR         Family History   Problem Relation Age of Onset    Cancer Father         Lung Ca       Social History     Socioeconomic History    Marital status:      Spouse name:  "Not on file    Number of children: Not on file    Years of education: Not on file    Highest education level: Not on file   Occupational History    Not on file   Social Needs    Financial resource strain: Not on file    Food insecurity     Worry: Not on file     Inability: Not on file    Transportation needs     Medical: Not on file     Non-medical: Not on file   Tobacco Use    Smoking status: Former Smoker     Packs/day: 1.00     Years: 15.00     Pack years: 15.00     Types: Cigarettes     Quit date: 2011     Years since quittin.7    Smokeless tobacco: Never Used   Substance and Sexual Activity    Alcohol use: No    Drug use: No    Sexual activity: Yes   Lifestyle    Physical activity     Days per week: Not on file     Minutes per session: Not on file    Stress: Not on file   Relationships    Social connections     Talks on phone: Not on file     Gets together: Not on file     Attends Rastafarian service: Not on file     Active member of club or organization: Not on file     Attends meetings of clubs or organizations: Not on file     Relationship status: Not on file   Other Topics Concern    Not on file   Social History Narrative    Not on file       MEDICATIONS & ALLERGIES:     Current Outpatient Medications on File Prior to Visit   Medication Sig    ergocalciferol (ERGOCALCIFEROL) 50,000 unit Cap Take 1 capsule (50,000 Units total) by mouth every 7 days.     No current facility-administered medications on file prior to visit.        Review of patient's allergies indicates:  No Known Allergies    OBJECTIVE:   Vital Signs:  Vitals:    10/23/20 1052   BP: 124/84   Pulse: 65   Resp: 18   Temp: 97.6 °F (36.4 °C)   TempSrc: Temporal   SpO2: 98%   Weight: 112.2 kg (247 lb 5.7 oz)   Height: 5' 10" (1.778 m)       No results found for this or any previous visit (from the past 24 hour(s)).      Physical Exam   Constitutional: He is oriented to person, place, and time and well-developed, " well-nourished, and in no distress.   HENT:   Head: Normocephalic and atraumatic.   Right Ear: External ear normal.   Left Ear: External ear normal.   Eyes: Pupils are equal, round, and reactive to light. Conjunctivae and EOM are normal. No scleral icterus.   Neck: Normal range of motion. Neck supple. No thyromegaly present.   Cardiovascular: Normal rate, regular rhythm, normal heart sounds and intact distal pulses. Exam reveals no gallop and no friction rub.   No murmur heard.  Pulmonary/Chest: Effort normal and breath sounds normal. No respiratory distress. He has no wheezes. He has no rales.   Musculoskeletal: Normal range of motion.         General: No tenderness, deformity or edema.   Lymphadenopathy:     He has no cervical adenopathy.   Neurological: He is alert and oriented to person, place, and time. He displays normal reflexes. No cranial nerve deficit. He exhibits normal muscle tone. Gait normal. Coordination normal. GCS score is 15.   Skin: Skin is warm and dry. No rash noted. He is not diaphoretic. No erythema.   Psychiatric: Mood, affect and judgment normal.   Nursing note and vitals reviewed.      ASSESSMENT & PLAN:     50 M here with 2 episodes of syncope. Features not consistent with arrhythmia with prodrome but I think I see delta waves on EKG during stress test. Get holter to ensure no SVT, tachyarrhtyhmia as etiology.     Due to headache, get head imaging.     Also with high blood pressure, headaches intermittently check urine metanephrines. Low suspicion, no indication for blood test.     Syncope, unspecified syncope type  -     Holter Monitor - 3-14 Day Adult; Future  -     Echo Color Flow Doppler? Yes; Future  -     Metanephrines, urine 24 Hours; Future    Syncope and collapse  -     CT Head Without Contrast; Future; Expected date: 10/23/2020     If no cause found and continues to recur will refer to cardiology.     Johan Chaudhari MD

## 2020-10-30 ENCOUNTER — HOSPITAL ENCOUNTER (OUTPATIENT)
Dept: RADIOLOGY | Facility: HOSPITAL | Age: 50
Discharge: HOME | End: 2020-10-30
Attending: STUDENT IN AN ORGANIZED HEALTH CARE EDUCATION/TRAINING PROGRAM
Payer: OTHER GOVERNMENT

## 2020-10-30 DIAGNOSIS — R55 SYNCOPE AND COLLAPSE: ICD-10-CM

## 2020-10-30 PROCEDURE — 70450 CT HEAD/BRAIN W/O DYE: CPT | Mod: TC

## 2020-10-30 PROCEDURE — 70450 CT HEAD/BRAIN W/O DYE: CPT | Mod: 26,,, | Performed by: RADIOLOGY

## 2020-10-30 PROCEDURE — 70450 CT HEAD WITHOUT CONTRAST: ICD-10-PCS | Mod: 26,,, | Performed by: RADIOLOGY

## 2020-11-02 ENCOUNTER — TELEPHONE (OUTPATIENT)
Dept: CARDIOLOGY | Facility: HOSPITAL | Age: 50
End: 2020-11-02

## 2020-11-03 ENCOUNTER — CLINICAL SUPPORT (OUTPATIENT)
Dept: CARDIOLOGY | Facility: HOSPITAL | Age: 50
End: 2020-11-03
Attending: STUDENT IN AN ORGANIZED HEALTH CARE EDUCATION/TRAINING PROGRAM
Payer: OTHER GOVERNMENT

## 2020-11-03 VITALS — BODY MASS INDEX: 35.42 KG/M2 | WEIGHT: 247.38 LBS | HEIGHT: 70 IN

## 2020-11-03 DIAGNOSIS — R55 SYNCOPE, UNSPECIFIED SYNCOPE TYPE: ICD-10-CM

## 2020-11-03 PROCEDURE — 93306 TTE W/DOPPLER COMPLETE: CPT | Mod: 26,,, | Performed by: INTERNAL MEDICINE

## 2020-11-03 PROCEDURE — 93306 TTE W/DOPPLER COMPLETE: CPT

## 2020-11-03 PROCEDURE — 93306 ECHO (CUPID ONLY): ICD-10-PCS | Mod: 26,,, | Performed by: INTERNAL MEDICINE

## 2020-11-04 LAB
AORTIC ROOT ANNULUS: 3.55 CM
AORTIC VALVE CUSP SEPERATION: 2.53 CM
AV INDEX (PROSTH): 0.99
AV MEAN GRADIENT: 5 MMHG
AV PEAK GRADIENT: 9 MMHG
AV VALVE AREA: 4.63 CM2
AV VELOCITY RATIO: 94.6
BSA FOR ECHO PROCEDURE: 2.35 M2
CV ECHO LV RWT: 0.47 CM
DOP CALC AO PEAK VEL: 1.54 M/S
DOP CALC AO VTI: 28.26 CM
DOP CALC LVOT AREA: 4.7 CM2
DOP CALC LVOT DIAMETER: 2.44 CM
DOP CALC LVOT PEAK VEL: 145.69 M/S
DOP CALC LVOT STROKE VOLUME: 130.72 CM3
DOP CALCLVOT PEAK VEL VTI: 27.97 CM
E WAVE DECELERATION TIME: 203.05 MSEC
E/A RATIO: 1.3
E/E' RATIO: 6.19 M/S
ECHO LV POSTERIOR WALL: 1.17 CM (ref 0.6–1.1)
FRACTIONAL SHORTENING: 25 % (ref 28–44)
INTERVENTRICULAR SEPTUM: 1.18 CM (ref 0.6–1.1)
IVRT: 87.02 MSEC
LEFT ATRIUM SIZE: 3.82 CM
LEFT INTERNAL DIMENSION IN SYSTOLE: 3.72 CM (ref 2.1–4)
LEFT VENTRICLE MASS INDEX: 97 G/M2
LEFT VENTRICULAR INTERNAL DIMENSION IN DIASTOLE: 4.93 CM (ref 3.5–6)
LEFT VENTRICULAR MASS: 221.92 G
LV LATERAL E/E' RATIO: 5.42 M/S
LV SEPTAL E/E' RATIO: 7.22 M/S
MV PEAK A VEL: 0.5 M/S
MV PEAK E VEL: 0.65 M/S
PV PEAK VELOCITY: 102.67 CM/S
RIGHT VENTRICULAR END-DIASTOLIC DIMENSION: 245 CM
TDI LATERAL: 0.12 M/S
TDI SEPTAL: 0.09 M/S
TDI: 0.11 M/S

## 2020-11-11 ENCOUNTER — OFFICE VISIT (OUTPATIENT)
Dept: OPHTHALMOLOGY | Facility: CLINIC | Age: 50
End: 2020-11-11
Payer: OTHER GOVERNMENT

## 2020-11-11 DIAGNOSIS — H53.10 EYE STRAIN, BILATERAL: Primary | ICD-10-CM

## 2020-11-11 DIAGNOSIS — Q12.0: ICD-10-CM

## 2020-11-11 PROCEDURE — 99999 PR PBB SHADOW E&M-EST. PATIENT-LVL III: CPT | Mod: PBBFAC,,, | Performed by: OPHTHALMOLOGY

## 2020-11-11 PROCEDURE — 99999 PR PBB SHADOW E&M-EST. PATIENT-LVL III: ICD-10-PCS | Mod: PBBFAC,,, | Performed by: OPHTHALMOLOGY

## 2020-11-11 PROCEDURE — 92004 PR EYE EXAM, NEW PATIENT,COMPREHESV: ICD-10-PCS | Mod: S$GLB,,, | Performed by: OPHTHALMOLOGY

## 2020-11-11 PROCEDURE — 92004 COMPRE OPH EXAM NEW PT 1/>: CPT | Mod: S$GLB,,, | Performed by: OPHTHALMOLOGY

## 2020-11-11 NOTE — PROGRESS NOTES
HPI     New pt here [place] about 3 weeks ago he blacked out and when he came to   he was very confused. States when he came to he felt a lot of Pressure   behind eyes and he had a bad Headache. Wanted to make sure eyes were   alright. States has had several tests done with pcp and was told to see us   next.   States has been using computer a lot more often.  has been using   +1.75 readers for computer and near. Working well. States when he is tired   he has trouble focusing and is very light sensitive.  States no gtts.    Denies pain/ FOL/ floaters.     Last edited by Benita Cowan on 11/11/2020  3:23 PM. (History)            Assessment /Plan     For exam results, see Encounter Report.    Eye strain, bilateral  -     Ambulatory referral/consult to Optometry    Cortical and zonular cataract      1. Eye strain, bilateral  Patient counselled about eyestrain and the need to take breaks when using computer.  Informed about 20-20-20 rule.    2. Cortical and zonular cataract  Mild cortical opacity inferonasal OU, not visually significant    F/u 2 years or PRN

## 2020-11-11 NOTE — LETTER
November 11, 2020      Ivan Adams, LOLIS  [address]  [place] [address]           [address]  [facility] [address]  Phone: [phone]          Patient: Shayne Huerta Jr.   MR Number: [id number]   YOB: 1970   Date of Visit: 11/11/2020       Dear Ivan Adams:    Thank you for referring Shayne Huerta to me for evaluation. Attached you will find relevant portions of my assessment and plan of care.    If you have questions, please do not hesitate to call me. I look forward to following Shayne Huerta along with you.    Sincerely,    Italo Dan MD    Enclosure  CC:  No Recipients    If you would like to receive this communication electronically, please contact [email or web address] or [phone] to request more information on [email or web address] Link access.    For providers and/or their staff who would like to refer a patient to [facility], please contact us through our one-stop-shop provider referral line, Tawana Ng, at [phone].    If you feel you have received this communication in error or would no longer like to receive these types of communications, please e-mail [email or web address]

## 2020-11-11 NOTE — PATIENT INSTRUCTIONS
Treatment for Computer Vision Syndrome  Computer vision syndrome (CVS) is a group of eye problems. The problems can include eyes that itch and tear and are dry and red. Your eyes may feel tired. You may not be able to focus well. With CVS these problems are the result of a lot of computer use. Use of e-readers and smart phones may also cause these problems. CVS is very common. Both children and adults can have symptoms of CVS.  Types of treatment  Treatment is done by making changes in your use of your computer screen. Changes may include:  · Resting your eyes at least 15 minutes after each 2 hours of computer use  · Looking away from the computer and into the distance for at least 20 seconds every 20 minutes  · Enlarging the text on your computer screen  · Reducing glare from nearby light sources  · Using a screen glare filter  · Using a flat-screen monitor  · Positioning your screen so that the center is 4 to 5 inches below your eye level  · Positioning your screen so that it is 20 to 28 inches from your eye  · Remember to blink often  Your health care provider will also treat any health problems that can cause dry eye and CVS, such as allergies or thyroid disease. You may need to take steps to reduce dry eye. This may include:  · Wearing lenses to correct your vision  · Using lubricating eye drops  · Using a humidifier  · Drinking plenty of water  · Taking a prescription medicine to increase tear production  Preventing computer vision syndrome  Take steps to create make changes in how you use your computer. See your eye care doctor once a year for a checkup. See your health care provider to help manage health conditions that can lead to CVS.  Date Last Reviewed: 8/3/2015  © 9890-1341 [employer]. [address] [place] [address], [place] [address]. All rights reserved. This information is not intended as a substitute for professional medical care. Always follow your healthcare professional's  instructions.

## 2021-05-10 ENCOUNTER — PATIENT MESSAGE (OUTPATIENT)
Dept: RESEARCH | Facility: HOSPITAL | Age: 51
End: 2021-05-10

## 2022-07-07 ENCOUNTER — OFFICE VISIT (OUTPATIENT)
Dept: URGENT CARE | Facility: CLINIC | Age: 52
End: 2022-07-07
Payer: COMMERCIAL

## 2022-07-07 VITALS
HEIGHT: 70 IN | HEART RATE: 72 BPM | RESPIRATION RATE: 16 BRPM | BODY MASS INDEX: 33.07 KG/M2 | DIASTOLIC BLOOD PRESSURE: 95 MMHG | WEIGHT: 231 LBS | OXYGEN SATURATION: 98 % | SYSTOLIC BLOOD PRESSURE: 156 MMHG | TEMPERATURE: 98 F

## 2022-07-07 DIAGNOSIS — M10.9 ACUTE GOUT OF RIGHT KNEE, UNSPECIFIED CAUSE: ICD-10-CM

## 2022-07-07 DIAGNOSIS — M25.561 ACUTE PAIN OF RIGHT KNEE: Primary | ICD-10-CM

## 2022-07-07 PROCEDURE — 3080F PR MOST RECENT DIASTOLIC BLOOD PRESSURE >= 90 MM HG: ICD-10-PCS | Mod: CPTII,S$GLB,,

## 2022-07-07 PROCEDURE — 3077F SYST BP >= 140 MM HG: CPT | Mod: CPTII,S$GLB,,

## 2022-07-07 PROCEDURE — 3008F PR BODY MASS INDEX (BMI) DOCUMENTED: ICD-10-PCS | Mod: CPTII,S$GLB,,

## 2022-07-07 PROCEDURE — 1160F RVW MEDS BY RX/DR IN RCRD: CPT | Mod: CPTII,S$GLB,,

## 2022-07-07 PROCEDURE — 1159F PR MEDICATION LIST DOCUMENTED IN MEDICAL RECORD: ICD-10-PCS | Mod: CPTII,S$GLB,,

## 2022-07-07 PROCEDURE — 99214 PR OFFICE/OUTPT VISIT, EST, LEVL IV, 30-39 MIN: ICD-10-PCS | Mod: S$GLB,,,

## 2022-07-07 PROCEDURE — 1159F MED LIST DOCD IN RCRD: CPT | Mod: CPTII,S$GLB,,

## 2022-07-07 PROCEDURE — 99214 OFFICE O/P EST MOD 30 MIN: CPT | Mod: S$GLB,,,

## 2022-07-07 PROCEDURE — 3080F DIAST BP >= 90 MM HG: CPT | Mod: CPTII,S$GLB,,

## 2022-07-07 PROCEDURE — 3008F BODY MASS INDEX DOCD: CPT | Mod: CPTII,S$GLB,,

## 2022-07-07 PROCEDURE — 3077F PR MOST RECENT SYSTOLIC BLOOD PRESSURE >= 140 MM HG: ICD-10-PCS | Mod: CPTII,S$GLB,,

## 2022-07-07 PROCEDURE — 1160F PR REVIEW ALL MEDS BY PRESCRIBER/CLIN PHARMACIST DOCUMENTED: ICD-10-PCS | Mod: CPTII,S$GLB,,

## 2022-07-07 RX ORDER — PREDNISONE 20 MG/1
20 TABLET ORAL 2 TIMES DAILY
Qty: 8 TABLET | Refills: 0 | Status: SHIPPED | OUTPATIENT
Start: 2022-07-07 | End: 2022-07-11

## 2022-07-07 RX ORDER — COLCHICINE 0.6 MG/1
0.6 TABLET ORAL 2 TIMES DAILY
Qty: 12 TABLET | Refills: 1 | Status: SHIPPED | OUTPATIENT
Start: 2022-07-07 | End: 2022-12-01

## 2022-07-07 NOTE — PROGRESS NOTES
"Subjective:       Patient ID: Shayne Huerta Jr. is a 51 y.o. male.    Vitals:  height is 5' 10" (1.778 m) and weight is 104.8 kg (231 lb). His oral temperature is 98.3 °F (36.8 °C). His blood pressure is 156/95 (abnormal) and his pulse is 72. His respiration is 16 and oxygen saturation is 98%.     Chief Complaint: Knee Pain    Knee Pain   Incident onset: 5 days ago. The injury mechanism is unknown. The pain is present in the right knee. The quality of the pain is described as aching. The pain is at a severity of 10/10. The pain is severe. The pain has been constant since onset. Associated symptoms include an inability to bear weight. It is unknown if a foreign body is present. The symptoms are aggravated by movement and weight bearing. Treatments tried: Rx gout meds. The treatment provided no relief.       Constitution: Negative for activity change, appetite change, chills, sweating, fever and unexpected weight change.   HENT: Negative for ear pain, postnasal drip, sinus pain, sinus pressure and sore throat.    Cardiovascular: Negative for chest pain.   Eyes: Negative for blurred vision.   Respiratory: Negative for chest tightness, cough and shortness of breath.    Gastrointestinal: Negative for abdominal pain.   Musculoskeletal: Positive for pain (right knee).   Neurological: Negative for dizziness, history of vertigo and altered mental status.   Psychiatric/Behavioral: Negative for altered mental status.       Objective:      Physical Exam   Constitutional: He is oriented to person, place, and time.  Non-toxic appearance. He does not appear ill. No distress.   HENT:   Head: Normocephalic.   Nose: Nose normal.   Mouth/Throat: Mucous membranes are moist.   Eyes: Conjunctivae are normal. Extraocular movement intact   Cardiovascular: Normal rate, normal heart sounds and normal pulses.   Pulmonary/Chest: Effort normal and breath sounds normal. No respiratory distress.   Musculoskeletal:      Right knee: He exhibits " normal range of motion and no swelling. Tenderness found. Medial joint line and lateral joint line tenderness noted.   Neurological: no focal deficit. He is alert and oriented to person, place, and time.   Skin: Skin is not diaphoretic. Capillary refill takes 2 to 3 seconds.   Psychiatric: His behavior is normal. Mood normal.         Assessment:       1. Acute pain of right knee    2. Acute gout of right knee, unspecified cause          Plan:         Acute pain of right knee  -     Ambulatory referral/consult to Rheumatology    Acute gout of right knee, unspecified cause  -     predniSONE (DELTASONE) 20 MG tablet; Take 1 tablet (20 mg total) by mouth 2 (two) times daily. for 4 days  Dispense: 8 tablet; Refill: 0  -     colchicine (COLCRYS) 0.6 mg tablet; Take 1 tablet (0.6 mg total) by mouth 2 (two) times daily. for 6 days  Dispense: 12 tablet; Refill: 1  -     Ambulatory referral/consult to Rheumatology         Patient presents with right knee pain, denies injury, fall or trauma. Reports history of gout and symptoms are consistent with previous gout flares. [facility] and he would like to establish care with a new provider. Rheumatology referall ordered. Patient reports colchicine and steroids normally work for his flare ups. Patient is already on allopurinol daily. Patient denies steroids in past 3 months. Last A1c <6.

## 2022-07-07 NOTE — PATIENT INSTRUCTIONS
Follow up with rheumatology as directed to establish care.     Return for any new or worsening of symptoms.

## 2022-12-01 ENCOUNTER — OFFICE VISIT (OUTPATIENT)
Dept: FAMILY MEDICINE | Facility: CLINIC | Age: 52
End: 2022-12-01
Payer: COMMERCIAL

## 2022-12-01 VITALS
SYSTOLIC BLOOD PRESSURE: 150 MMHG | HEART RATE: 76 BPM | HEIGHT: 70 IN | DIASTOLIC BLOOD PRESSURE: 86 MMHG | WEIGHT: 252.19 LBS | OXYGEN SATURATION: 98 % | BODY MASS INDEX: 36.1 KG/M2

## 2022-12-01 DIAGNOSIS — L57.0 ACTINIC KERATOSIS: ICD-10-CM

## 2022-12-01 DIAGNOSIS — Z12.11 COLON CANCER SCREENING: ICD-10-CM

## 2022-12-01 DIAGNOSIS — Z00.00 ANNUAL PHYSICAL EXAM: Primary | ICD-10-CM

## 2022-12-01 DIAGNOSIS — R03.0 ELEVATED BLOOD PRESSURE READING: ICD-10-CM

## 2022-12-01 DIAGNOSIS — M1A.09X0 IDIOPATHIC CHRONIC GOUT OF MULTIPLE SITES WITHOUT TOPHUS: ICD-10-CM

## 2022-12-01 PROCEDURE — 1160F PR REVIEW ALL MEDS BY PRESCRIBER/CLIN PHARMACIST DOCUMENTED: ICD-10-PCS | Mod: CPTII,S$GLB,, | Performed by: NURSE PRACTITIONER

## 2022-12-01 PROCEDURE — 3008F PR BODY MASS INDEX (BMI) DOCUMENTED: ICD-10-PCS | Mod: CPTII,S$GLB,, | Performed by: NURSE PRACTITIONER

## 2022-12-01 PROCEDURE — 17000 DESTRUCT PREMALG LESION: CPT | Mod: S$GLB,,, | Performed by: NURSE PRACTITIONER

## 2022-12-01 PROCEDURE — 3008F BODY MASS INDEX DOCD: CPT | Mod: CPTII,S$GLB,, | Performed by: NURSE PRACTITIONER

## 2022-12-01 PROCEDURE — 1160F RVW MEDS BY RX/DR IN RCRD: CPT | Mod: CPTII,S$GLB,, | Performed by: NURSE PRACTITIONER

## 2022-12-01 PROCEDURE — 1159F PR MEDICATION LIST DOCUMENTED IN MEDICAL RECORD: ICD-10-PCS | Mod: CPTII,S$GLB,, | Performed by: NURSE PRACTITIONER

## 2022-12-01 PROCEDURE — 99214 PR OFFICE/OUTPT VISIT, EST, LEVL IV, 30-39 MIN: ICD-10-PCS | Mod: 25,S$GLB,, | Performed by: NURSE PRACTITIONER

## 2022-12-01 PROCEDURE — 17000 DESTRUCTION, PREMALIGNANT LESION: ICD-10-PCS | Mod: S$GLB,,, | Performed by: NURSE PRACTITIONER

## 2022-12-01 PROCEDURE — 1159F MED LIST DOCD IN RCRD: CPT | Mod: CPTII,S$GLB,, | Performed by: NURSE PRACTITIONER

## 2022-12-01 PROCEDURE — 99214 OFFICE O/P EST MOD 30 MIN: CPT | Mod: 25,S$GLB,, | Performed by: NURSE PRACTITIONER

## 2022-12-01 RX ORDER — ALLOPURINOL 300 MG/1
300 TABLET ORAL DAILY
COMMUNITY
End: 2023-05-01 | Stop reason: SDUPTHER

## 2022-12-01 RX ORDER — INDOMETHACIN 50 MG/1
50 CAPSULE ORAL 3 TIMES DAILY
COMMUNITY
End: 2023-01-24

## 2022-12-01 NOTE — PROGRESS NOTES
"  SUBJECTIVE:    Patient ID: Shayne Huerta Jr. is a 52 y.o. male.    Chief Complaint: Establish Care (Bottles brought/ Establish Care pt would like to this gout and skin issues/ Decline flu/Patient would like to do cologuard kit instead of colonscopy/BG)      Pt here for new pt appt. Reports [facility] pt of [facility]    Pt reports hx of  gout for over 20 years. Was seeing Dr. Pagan a few years ago and has recently tried to find a new rheumatologist but couldn't. Reports this year alone has had 3 severe gout episodes involving either right wrist, right knee or right ankle- treated at . Reports joints will swell, red and hot. Reports has had to walk with cane when knee/ankle pain was so bad. Has been compliant with allopurinol 300mg and using colchine prn for flares- will generally have to take colchicine BID for 6-7 days before pain/swelling resolves    Reports had cardiac w/u in 2020 after "blacking out" which was negative- had about 45 minutes of no recollection of his activity though no true LOC at that time- pt states he thinks it was from anxiety. No further episodes since then    Has lesion to left cheek he's noticed recently    No visits with results within 6 Month(s) from this visit.   Latest known visit with results is:   Clinical Support on 11/03/2020   Component Date Value Ref Range Status    BSA 11/03/2020 2.35  m2 Final    TDI SEPTAL 11/03/2020 0.09  m/s Final    LV LATERAL E/E' RATIO 11/03/2020 5.42  m/s Final    LV SEPTAL E/E' RATIO 11/03/2020 7.22  m/s Final    AORTIC VALVE CUSP SEPERATION 11/03/2020 2.53  cm Final    TDI LATERAL 11/03/2020 0.12  m/s Final    PV PEAK VELOCITY 11/03/2020 102.67  cm/s Final    LVIDd 11/03/2020 4.93  3.5 - 6.0 cm Final    IVS 11/03/2020 1.18 (A)  0.6 - 1.1 cm Final    Posterior Wall 11/03/2020 1.17 (A)  0.6 - 1.1 cm Final    Ao root annulus 11/03/2020 3.55  cm Final    LVIDs 11/03/2020 3.72  2.1 - 4.0 cm Final    FS " 11/03/2020 25  28 - 44 % Final    LV mass 11/03/2020 221.92  g Final    LA size 11/03/2020 3.82  cm Final    RVDD 11/03/2020 245.00  cm Final    Left Ventricle Relative Wall Thick* 11/03/2020 0.47  cm Final    AV mean gradient 11/03/2020 5  mmHg Final    AV valve area 11/03/2020 4.63  cm2 Final    AV Velocity Ratio 11/03/2020 94.60   Final    AV index (prosthetic) 11/03/2020 0.99   Final    E/A ratio 11/03/2020 1.30   Final    Mean e' 11/03/2020 0.11  m/s Final    E wave deceleration time 11/03/2020 203.05  msec Final    IVRT 11/03/2020 87.02  msec Final    LVOT diameter 11/03/2020 2.44  cm Final    LVOT area 11/03/2020 4.7  cm2 Final    LVOT peak ricky 11/03/2020 145.69  m/s Final    LVOT peak VTI 11/03/2020 27.97  cm Final    Ao peak ricky 11/03/2020 1.54  m/s Final    Ao VTI 11/03/2020 28.26  cm Final    LVOT stroke volume 11/03/2020 130.72  cm3 Final    AV peak gradient 11/03/2020 9  mmHg Final    E/E' ratio 11/03/2020 6.19  m/s Final    MV Peak E Ricky 11/03/2020 0.65  m/s Final    MV Peak A Ricky 11/03/2020 0.50  m/s Final    LV Mass Index 11/03/2020 97  g/m2 Final       Past Medical History:   Diagnosis Date    Gout, unspecified      Past Surgical History:   Procedure Laterality Date    FINGER SURGERY      finger laceration- near amputation    KNEE SURGERY Left     scope    ROTATOR CUFF REPAIR Right     TONSILLECTOMY       Family History   Problem Relation Age of Onset    Cancer Father         Lung Ca       Marital Status:   Alcohol History:  reports no history of alcohol use.  Tobacco History:  reports that he quit smoking about 11 years ago. His smoking use included cigarettes. He has a 15.00 pack-year smoking history. He has never used smokeless tobacco.  Drug History:  reports no history of drug use.    Review of patient's allergies indicates:  No Known Allergies    Current Outpatient Medications:     allopurinoL (ZYLOPRIM) 300 MG tablet, Take 300 mg by mouth once daily. Take 1 tablet by mouth daily,  "Disp: , Rfl:     colchicine (COLCRYS) 0.6 mg tablet, Take 1 tablet (0.6 mg total) by mouth 2 (two) times daily. for 6 days, Disp: 12 tablet, Rfl: 1    ergocalciferol (ERGOCALCIFEROL) 50,000 unit Cap, TAKE 1 CAPSULE BY MOUTH EVERY 7 DAYS (Patient not taking: Reported on 12/1/2022), Disp: 12 capsule, Rfl: 0    indomethacin (INDOCIN) 50 MG capsule, Take 50 mg by mouth 3 (three) times daily. Take 1 capsules by mouth three times daily as needed for pain, Disp: , Rfl:     Review of Systems   Constitutional:  Negative for appetite change, chills, fever and unexpected weight change.   HENT:  Negative for sore throat and trouble swallowing.    Respiratory:  Negative for cough, shortness of breath and wheezing.    Cardiovascular:  Negative for chest pain, palpitations and leg swelling.   Gastrointestinal:  Negative for abdominal pain, constipation and diarrhea.   Genitourinary:  Negative for dysuria, frequency and hematuria.   Musculoskeletal:  Positive for arthralgias (left ankle, left knee and left wrist pain) and joint swelling (with gout flares). Negative for back pain and gait problem.   Skin:  Negative for rash.   Neurological:  Negative for dizziness, syncope, numbness and headaches.   Psychiatric/Behavioral:  Negative for dysphoric mood. The patient is not nervous/anxious.         Objective:      Vitals:    12/01/22 0959 12/01/22 1009 12/01/22 1059   BP: (!) 130/90 (!) 140/90 (!) 150/86   Pulse: 76     SpO2: 98%     Weight: 114.4 kg (252 lb 3.2 oz)     Height: 5' 10" (1.778 m)       Physical Exam  Vitals reviewed.   Constitutional:       General: He is not in acute distress.     Appearance: He is well-developed.   HENT:      Head: Normocephalic and atraumatic.        Right Ear: Tympanic membrane and ear canal normal.      Left Ear: Tympanic membrane and ear canal normal.      Mouth/Throat:      Pharynx: No posterior oropharyngeal erythema.   Neck:      Vascular: No carotid bruit.   Cardiovascular:      Rate and " Rhythm: Normal rate and regular rhythm.      Heart sounds: No murmur heard.  Pulmonary:      Effort: Pulmonary effort is normal. No respiratory distress.      Breath sounds: Normal breath sounds. No wheezing or rales.   Abdominal:      General: There is no distension.      Palpations: Abdomen is soft.      Tenderness: There is no abdominal tenderness.   Musculoskeletal:      Right wrist: No swelling or deformity.      Cervical back: Neck supple.      Right knee: No effusion, bony tenderness or crepitus. Normal range of motion.      Right lower leg: No edema.      Left lower leg: No edema.   Lymphadenopathy:      Cervical: No cervical adenopathy.   Skin:     General: Skin is warm and dry.      Findings: No rash.   Neurological:      General: No focal deficit present.      Mental Status: He is alert and oriented to person, place, and time.      Gait: Gait normal.   Psychiatric:         Mood and Affect: Mood normal.       Destruction, Premalignant Lesion    Date/Time: 12/1/2022 10:20 AM  Performed by: Becky Brown NP  Authorized by: Becky Brown NP     Consent Done?:  Yes (Verbal)  Location(s):  Head/Neck:  Cheek        Detail: left cheek  Number of lesions:  1  Destruction method:  Cryotherapy   Patient tolerated the procedure well with no immediate complications.   Post-operative instructions were provided for the patient.   Assessment:       1. Annual physical exam    2. Idiopathic chronic gout of multiple sites without tophus    3. Elevated blood pressure reading    4. Actinic keratosis    5. Colon cancer screening           Plan:       Annual physical exam  Comments:  Baseline labs ordered  Orders:  -     CBC Auto Differential; Future; Expected date: 12/01/2022  -     Comprehensive Metabolic Panel; Future; Expected date: 12/01/2022  -     Lipid Panel; Future; Expected date: 12/01/2022  -     Urinalysis, Reflex to Urine Culture Urine, Clean Catch; Future; Expected date: 12/01/2022  -     TSH w/reflex to  FT4; Future; Expected date: 12/01/2022    Idiopathic chronic gout of multiple sites without tophus  Comments:  Will check uric acid level, continue allopurinol for now.  Will check x-rays to evaluate for tophi/joint damage  Orders:  -     Uric Acid; Future; Expected date: 12/01/2022  -     Sedimentation rate; Future; Expected date: 12/01/2022  -     C-reactive protein; Future; Expected date: 12/01/2022  -     X-Ray Wrist Complete Right; Future; Expected date: 12/01/2022  -     X-Ray Knee 3 View Right; Future; Expected date: 12/01/2022  -     X-Ray Ankle Complete Right; Future; Expected date: 12/01/2022    Elevated blood pressure reading  Comments:  BP elevated today, discussed low-salt diet and regular exercise, recommend repeat blood pressure check when he returns for labs    Actinic keratosis  Comments:  AK to left cheek frozen, given wound care instructions and advised to monitor for recurrence  Orders:  -     Destruction, Premalignant Lesion    Colon cancer screening  Comments:  Discussed colon cancer screening and would recommend colonoscopy as initial screening.  Patient agrees and referral provided  Orders:  -     Ambulatory referral/consult to Gastroenterology; Future; Expected date: 12/08/2022    Follow up in about 3 months (around 3/1/2023) for labs within next 2 weeks.        12/3/2022 Becky Brown NP

## 2022-12-01 NOTE — PATIENT INSTRUCTIONS
Catarino Rob MD- call to schedule colonoscopy  [id number] [place] [address]  Phone: [phone]      Come back for fasting bloodwork and blood pressure recheck within the next 1-2 weeks

## 2022-12-03 PROBLEM — M1A.09X0 IDIOPATHIC CHRONIC GOUT OF MULTIPLE SITES WITHOUT TOPHUS: Status: ACTIVE | Noted: 2022-12-03

## 2022-12-04 NOTE — PROCEDURES
Destruction, Premalignant Lesion    Date/Time: 12/1/2022 10:20 AM  Performed by: Becky Brown NP  Authorized by: Becky Brown NP     Consent Done?:  Yes (Verbal)  Location(s):  Head/Neck:  Cheek        Detail: left cheek  Number of lesions:  1  Destruction method:  Cryotherapy   Patient tolerated the procedure well with no immediate complications.   Post-operative instructions were provided for the patient.

## 2022-12-13 ENCOUNTER — HOSPITAL ENCOUNTER (OUTPATIENT)
Dept: RADIOLOGY | Facility: HOSPITAL | Age: 52
Discharge: HOME | End: 2022-12-13
Attending: NURSE PRACTITIONER
Payer: COMMERCIAL

## 2022-12-13 ENCOUNTER — PATIENT MESSAGE (OUTPATIENT)
Dept: FAMILY MEDICINE | Facility: CLINIC | Age: 52
End: 2022-12-13

## 2022-12-13 DIAGNOSIS — M1A.09X0 IDIOPATHIC CHRONIC GOUT OF MULTIPLE SITES WITHOUT TOPHUS: ICD-10-CM

## 2022-12-13 PROCEDURE — 73610 X-RAY EXAM OF ANKLE: CPT | Mod: TC,PO,RT

## 2022-12-13 PROCEDURE — 73562 X-RAY EXAM OF KNEE 3: CPT | Mod: TC,PO,RT

## 2022-12-13 PROCEDURE — 73110 X-RAY EXAM OF WRIST: CPT | Mod: TC,PO,RT

## 2022-12-14 ENCOUNTER — TELEPHONE (OUTPATIENT)
Dept: FAMILY MEDICINE | Facility: CLINIC | Age: 52
End: 2022-12-14

## 2022-12-14 LAB
ALBUMIN SERPL-MCNC: 4.4 G/DL (ref 3.6–5.1)
ALBUMIN/GLOB SERPL: 2.1 (CALC) (ref 1–2.5)
ALP SERPL-CCNC: 74 U/L (ref 35–144)
ALT SERPL-CCNC: 49 U/L (ref 9–46)
APPEARANCE UR: CLEAR
AST SERPL-CCNC: 21 U/L (ref 10–35)
BACTERIA #/AREA URNS HPF: NORMAL /HPF
BACTERIA UR CULT: NORMAL
BASOPHILS # BLD AUTO: 29 CELLS/UL (ref 0–200)
BASOPHILS NFR BLD AUTO: 0.4 %
BILIRUB SERPL-MCNC: 0.4 MG/DL (ref 0.2–1.2)
BILIRUB UR QL STRIP: NEGATIVE
BUN SERPL-MCNC: 16 MG/DL (ref 7–25)
BUN/CREAT SERPL: ABNORMAL (CALC) (ref 6–22)
CALCIUM SERPL-MCNC: 9.2 MG/DL (ref 8.6–10.3)
CHLORIDE SERPL-SCNC: 106 MMOL/L (ref 98–110)
CHOLEST SERPL-MCNC: 197 MG/DL
CHOLEST/HDLC SERPL: 5.5 (CALC)
CO2 SERPL-SCNC: 23 MMOL/L (ref 20–32)
COLOR UR: YELLOW
CREAT SERPL-MCNC: 0.8 MG/DL (ref 0.7–1.3)
CRP SERPL-MCNC: 12.7 MG/L
EGFR: 107 ML/MIN/1.73M2
EOSINOPHIL # BLD AUTO: 329 CELLS/UL (ref 15–500)
EOSINOPHIL NFR BLD AUTO: 4.5 %
ERYTHROCYTE [DISTWIDTH] IN BLOOD BY AUTOMATED COUNT: 13.5 % (ref 11–15)
ERYTHROCYTE [SEDIMENTATION RATE] IN BLOOD BY WESTERGREN METHOD: 6 MM/H
GLOBULIN SER CALC-MCNC: 2.1 G/DL (CALC) (ref 1.9–3.7)
GLUCOSE SERPL-MCNC: 96 MG/DL (ref 65–99)
GLUCOSE UR QL STRIP: NEGATIVE
HCT VFR BLD AUTO: 45.7 % (ref 38.5–50)
HDLC SERPL-MCNC: 36 MG/DL
HGB BLD-MCNC: 15.3 G/DL (ref 13.2–17.1)
HGB UR QL STRIP: NEGATIVE
HYALINE CASTS #/AREA URNS LPF: NORMAL /LPF
KETONES UR QL STRIP: NEGATIVE
LDLC SERPL CALC-MCNC: 133 MG/DL (CALC)
LEUKOCYTE ESTERASE UR QL STRIP: NEGATIVE
LYMPHOCYTES # BLD AUTO: 1964 CELLS/UL (ref 850–3900)
LYMPHOCYTES NFR BLD AUTO: 26.9 %
MCH RBC QN AUTO: 28.1 PG (ref 27–33)
MCHC RBC AUTO-ENTMCNC: 33.5 G/DL (ref 32–36)
MCV RBC AUTO: 84 FL (ref 80–100)
MONOCYTES # BLD AUTO: 599 CELLS/UL (ref 200–950)
MONOCYTES NFR BLD AUTO: 8.2 %
NEUTROPHILS # BLD AUTO: 4380 CELLS/UL (ref 1500–7800)
NEUTROPHILS NFR BLD AUTO: 60 %
NITRITE UR QL STRIP: NEGATIVE
NONHDLC SERPL-MCNC: 161 MG/DL (CALC)
PH UR STRIP: 6.5 [PH] (ref 5–8)
PLATELET # BLD AUTO: 253 THOUSAND/UL (ref 140–400)
PMV BLD REES-ECKER: 10.6 FL (ref 7.5–12.5)
POTASSIUM SERPL-SCNC: 4.2 MMOL/L (ref 3.5–5.3)
PROT SERPL-MCNC: 6.5 G/DL (ref 6.1–8.1)
PROT UR QL STRIP: NEGATIVE
RBC # BLD AUTO: 5.44 MILLION/UL (ref 4.2–5.8)
RBC #/AREA URNS HPF: NORMAL /HPF
SERVICE CMNT-IMP: NORMAL
SODIUM SERPL-SCNC: 139 MMOL/L (ref 135–146)
SP GR UR STRIP: 1.02 (ref 1–1.03)
SQUAMOUS #/AREA URNS HPF: NORMAL /HPF
TRIGL SERPL-MCNC: 152 MG/DL
TSH SERPL-ACNC: 1.62 MIU/L (ref 0.4–4.5)
URATE SERPL-MCNC: 4.4 MG/DL (ref 4–8)
WBC # BLD AUTO: 7.3 THOUSAND/UL (ref 3.8–10.8)
WBC #/AREA URNS HPF: NORMAL /HPF

## 2022-12-19 NOTE — PROGRESS NOTES
"      [facility] RHEUMATOLOGY           Follow-up visit      Subjective:       Patient ID:   NAME: Shayne Huerta Jr. : 1970     47 y.o. male    Referring Doc: No ref. provider found  Other Physicians:    Chief Complaint:  No chief complaint on file.      HPI/Interval History:   The patient is doing well. He has had no attacks of gout since his last visit.          ROS:   GEN:    no fever, night sweats or weight loss  SKIN:   no rashes , erythema, bruising, or swelling, no Raynauds, no photosensitivity  HEENT: no HAs, no changes in vision, no mouth ulcers, no sicca symptoms, no scalp tenderness, jaw claudication.  CV:      no CP, SOB, PND, AGRAWAL or orthopnea,no palpitations  PULM: no SOB, cough, hemoptysis, sputum or pleuritic pain  GI:      no abdominal pain, nausea, vomiting, constipation, diarrhea, melanotic stools, BRBPR, or hematemesis, no dysphagia, no GERD  :     no hematuria, dysuria  NEURO: no paresthesias, headaches, visual disturbances  MUSCULOSKELETAL:  no muscle or joint pain  PSYCH:   No insomnia, no significant depression, no anxiety    Medications:    Current Outpatient Prescriptions:     sulindac (CLINORIL) 200 MG Tab, Take 1 tablet (200 mg total) by mouth 2 (two) times daily., Disp: 60 tablet, Rfl: 5    allopurinol (ZYLOPRIM) 300 MG tablet, Take 1 tablet (300 mg total) by mouth once daily., Disp: 30 tablet, Rfl: 5    colchicine 0.6 mg tablet, Take 1 tablet (0.6 mg total) by mouth once daily., Disp: 60 tablet, Rfl: 5      FAMILY HISTORY: negative for Connective Tissue Disease        Review of patient's allergies indicates:  No Known Allergies          Objective:     Vitals:  Blood pressure 126/80, height 5' 10" (1.778 m), weight 103 kg (227 lb).    Physical Examination:   GEN: wn/wd male in no apparent distress  SKIN: no rashes, no lesions, no sclerodactyly, no Raynaud's, no periungual erythema  HEAD: no alopecia, no scalp tenderness, no temporal artery tenderness or induration.  EYES: no " pallor, no icterus, PERRLA  ENT:  no thrush, no mucosal dryness or ulcerations, adequate oral hygiene & dentition.  NECK: supple x 6, no masses, no thyromegaly, no lymphadenopathy.  CV:   S1 and S2 regular, no murmurs, gallop or rubs  CHEST: Normal respiratory effort;  normal breath sounds/no adventitious sounds. No signs of consolidation.  ABD: non-tender and non-distended; soft; normal bowel sounds; no rebound/guarding or tenderness. No hepatosplenomegaly.  Musculoskeletal:  No evidence of active arthritis or a peripheral tophi  EXTREM: no clubbing, cyanosis or edema. normal pulses.  NEURO: grossly intact; motor/sensory WNL; AAOx3; no tremors  PSYCH:  normal mood, affect and behavior            Labs:   Lab Results   Component Value Date    WBC 11.7 (H) 02/07/2018    HGB 14.1 02/07/2018    HCT 43.0 02/07/2018    MCV 83.8 02/07/2018     02/07/2018   CMP@  Sodium   Date Value Ref Range Status   02/07/2018 137 134 - 144 mmol/L      Potassium   Date Value Ref Range Status   02/07/2018 3.9 3.5 - 5.0 mmol/L      Chloride   Date Value Ref Range Status   02/07/2018 103 98 - 110 mmol/L      CO2   Date Value Ref Range Status   02/07/2018 25.7 22.8 - 31.6 mmol/L      Glucose   Date Value Ref Range Status   02/07/2018 81 70 - 99 mg/dL      BUN, Bld   Date Value Ref Range Status   02/07/2018 13 8 - 20 mg/dL      Creatinine   Date Value Ref Range Status   02/07/2018 0.76 0.60 - 1.40 mg/dL      Calcium   Date Value Ref Range Status   02/07/2018 9.6 7.7 - 10.4 mg/dL      Total Protein   Date Value Ref Range Status   02/07/2018 7.2 6.0 - 8.2 g/dL      Albumin   Date Value Ref Range Status   02/07/2018 4.4 3.1 - 4.7 g/dL      Total Bilirubin   Date Value Ref Range Status   02/07/2018 0.8 0.3 - 1.0 mg/dL      Alkaline Phosphatase   Date Value Ref Range Status   02/07/2018 83 40 - 104 IU/L      AST   Date Value Ref Range Status   02/07/2018 15 10 - 40 IU/L      CRP   Date Value Ref Range Status   02/07/2018 1.19 0.00 - 1.40  mg/dL          Radiology/Diagnostic Studies:    The serum uric acid was 6.1, the 24-hour urine uric acid was 1150 mg.    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      PLAN:  I have selected allopurinol 300 mg daily. He will also be taking colchicine 0.6 mg twice daily. He has been advised to use the Clinoril I had given him only on an as-needed basis for joint pain. The plan is to see him every 2 months and evaluate his uric acid level. My target will be 5.0. At that time, we will have fixed his allopurinol dosage and we will also be able to then take him off colchicine.  Information on the gout diet was provided to him.    RTC:  I will see him in 2 months      Electronically signed by Wong Pagan MD               Left TkR, WBAT  Prineo,   Post-op xrays checked by surgeon and cleared

## 2022-12-22 ENCOUNTER — OFFICE VISIT (OUTPATIENT)
Dept: RHEUMATOLOGY | Facility: CLINIC | Age: 52
End: 2022-12-22
Payer: COMMERCIAL

## 2022-12-22 VITALS
WEIGHT: 258.38 LBS | BODY MASS INDEX: 36.99 KG/M2 | HEIGHT: 70 IN | HEART RATE: 71 BPM | DIASTOLIC BLOOD PRESSURE: 91 MMHG | SYSTOLIC BLOOD PRESSURE: 158 MMHG

## 2022-12-22 DIAGNOSIS — M1A.09X0 IDIOPATHIC CHRONIC GOUT OF MULTIPLE SITES WITHOUT TOPHUS: Primary | ICD-10-CM

## 2022-12-22 PROCEDURE — 99999 PR PBB SHADOW E&M-EST. PATIENT-LVL III: ICD-10-PCS | Mod: PBBFAC,,, | Performed by: INTERNAL MEDICINE

## 2022-12-22 PROCEDURE — 1160F PR REVIEW ALL MEDS BY PRESCRIBER/CLIN PHARMACIST DOCUMENTED: ICD-10-PCS | Mod: CPTII,S$GLB,, | Performed by: INTERNAL MEDICINE

## 2022-12-22 PROCEDURE — 3080F DIAST BP >= 90 MM HG: CPT | Mod: CPTII,S$GLB,, | Performed by: INTERNAL MEDICINE

## 2022-12-22 PROCEDURE — 99204 OFFICE O/P NEW MOD 45 MIN: CPT | Mod: S$GLB,,, | Performed by: INTERNAL MEDICINE

## 2022-12-22 PROCEDURE — 1159F MED LIST DOCD IN RCRD: CPT | Mod: CPTII,S$GLB,, | Performed by: INTERNAL MEDICINE

## 2022-12-22 PROCEDURE — 99204 PR OFFICE/OUTPT VISIT, NEW, LEVL IV, 45-59 MIN: ICD-10-PCS | Mod: S$GLB,,, | Performed by: INTERNAL MEDICINE

## 2022-12-22 PROCEDURE — 1159F PR MEDICATION LIST DOCUMENTED IN MEDICAL RECORD: ICD-10-PCS | Mod: CPTII,S$GLB,, | Performed by: INTERNAL MEDICINE

## 2022-12-22 PROCEDURE — 99999 PR PBB SHADOW E&M-EST. PATIENT-LVL III: CPT | Mod: PBBFAC,,, | Performed by: INTERNAL MEDICINE

## 2022-12-22 PROCEDURE — 3008F BODY MASS INDEX DOCD: CPT | Mod: CPTII,S$GLB,, | Performed by: INTERNAL MEDICINE

## 2022-12-22 PROCEDURE — 1160F RVW MEDS BY RX/DR IN RCRD: CPT | Mod: CPTII,S$GLB,, | Performed by: INTERNAL MEDICINE

## 2022-12-22 PROCEDURE — 3077F SYST BP >= 140 MM HG: CPT | Mod: CPTII,S$GLB,, | Performed by: INTERNAL MEDICINE

## 2022-12-22 PROCEDURE — 3077F PR MOST RECENT SYSTOLIC BLOOD PRESSURE >= 140 MM HG: ICD-10-PCS | Mod: CPTII,S$GLB,, | Performed by: INTERNAL MEDICINE

## 2022-12-22 PROCEDURE — 3008F PR BODY MASS INDEX (BMI) DOCUMENTED: ICD-10-PCS | Mod: CPTII,S$GLB,, | Performed by: INTERNAL MEDICINE

## 2022-12-22 PROCEDURE — 3080F PR MOST RECENT DIASTOLIC BLOOD PRESSURE >= 90 MM HG: ICD-10-PCS | Mod: CPTII,S$GLB,, | Performed by: INTERNAL MEDICINE

## 2022-12-22 RX ORDER — COLCHICINE 0.6 MG/1
0.6 TABLET ORAL 2 TIMES DAILY
Qty: 60 TABLET | Refills: 11 | Status: SHIPPED | OUTPATIENT
Start: 2022-12-22 | End: 2024-01-31

## 2022-12-22 NOTE — PROGRESS NOTES
Subjective:       Patient ID: Shayne Huerta Jr. is a 52 y.o. male.    Chief Complaint: Disease Management            Pertinent negatives include no fever, trouble swallowing or headaches.       52 year old male presents as a new patient to rheumatology clinic for disease management of chronic gout. He also has a remote history of septic joint of left knee. He has a 20 year history of gout managed with allopurinol 300mg daily and colchicine PRN during attacks. He previously followed with Dr. Pagan in [place] until he retired 3 years ago; [facility] This year he reports more frequent and more severe gout attacks. He has had 4 gout attacks this year; in right ankle, right knee, right wrist, and currently left PIP. Each attack he will present to a clinic due to difficulty seeing his rheumatologists.     His ankle began as a dull burning pain that slowly progressed to swollen, warm, and painful. This resolved with colchicine. He then began to experience right knee pain a few months later which he described as significantly worse; he had to use a cane for ambulation. He presented to Urgent Care where he was given prednisone 20mg BID and colchicine 0.6mg BID. The patient reports also receiving CSI to his knee. The symptoms subsequently resolved. Soon after, he developed burning pain and swelling in his right wrist which was not as severe as his knee pain. For this, he presented to a different clinic for which he was given indomethacin. His symptoms resolved. Approximately 1 week ago, his left index PIP began to swell and become painful. He took indomethacin for this and his pain and swelling have improved, but not resolved. He is out of colchicine but continues with allopurinol 300mg daily. He has not smoked cigarettes for 8 years and not drank alcohol for 24 years. He believes his gout attacks are worsened with dehydration.     Review of Systems   Constitutional:  Negative for  "fever and unexpected weight change.   HENT:  Negative for mouth sores and trouble swallowing.    Eyes:  Negative for redness.   Respiratory:  Negative for cough and shortness of breath.    Cardiovascular:  Negative for chest pain.   Gastrointestinal:  Negative for constipation and diarrhea.   Genitourinary:  Negative for genital sores.   Musculoskeletal:  Positive for arthralgias.   Skin:  Negative for rash.   Neurological:  Negative for headaches.   Hematological:  Does not bruise/bleed easily.       Objective:   BP (!) 158/91   Pulse 71   Ht 5' 10" (1.778 m)   Wt 117.2 kg (258 lb 6.1 oz)   BMI 37.07 kg/m²      Physical Exam   Constitutional: He is oriented to person, place, and time. normal appearance.   HENT:   Head: Normocephalic and atraumatic.   Nose: Nose normal.   Mouth/Throat: Mucous membranes are moist. Oropharynx is clear.   Eyes: Pupils are equal, round, and reactive to light. Conjunctivae are normal.   Neck:   Decreased neck ROM    Cardiovascular: Normal rate, regular rhythm and normal pulses.   Pulmonary/Chest: Effort normal.   Abdominal: Soft. There is no abdominal tenderness.   Musculoskeletal:      Comments: Left 2nd PIP warm, swollen, and tender to palpation. Right shoulder pain with terminal flexion. Joint exam normal otherwise.     Neurological: He is alert and oriented to person, place, and time.   Skin: Skin is warm.   White punctate lesions to bilateral lower extremities        Latest Reference Range & Units 12/13/22 09:44   WBC 3.8 - 10.8 Thousand/uL 7.3   RBC 4.20 - 5.80 Million/uL 5.44   HEMOGLOBIN 13.2 - 17.1 g/dL 15.3   HEMATOCRIT 38.5 - 50.0 % 45.7   MCV 80.0 - 100.0 fL 84.0   MCH 27.0 - 33.0 pg 28.1   MCHC 32.0 - 36.0 g/dL 33.5   RDW 11.0 - 15.0 % 13.5   Platelets 140 - 400 Thousand/uL 253   MPV 7.5 - 12.5 fL 10.6   Neutrophils Relative % 60   Lymph % % 26.9   Mono % % 8.2   Eosinophil % % 4.5   Basophil % % 0.4   Neutrophils, Abs 1,500 - 7,800 cells/uL 4,380   Lymph # 850 - 3,900 " cells/uL 1,964   Mono # 200 - 950 cells/uL 599   Eos # 15 - 500 cells/uL 329   Baso # 0 - 200 cells/uL 29   Sed Rate < OR = 20 mm/h 6   Sodium 135 - 146 mmol/L 139   Potassium 3.5 - 5.3 mmol/L 4.2   Chloride 98 - 110 mmol/L 106   CO2 20 - 32 mmol/L 23   BUN 7 - 25 mg/dL 16   Creatinine 0.70 - 1.30 mg/dL 0.80   BUN/CREAT RATIO 6 - 22 (calc) NOT APPLICABLE   eGFR > OR = 60 mL/min/1.73m2 107   Glucose 65 - 99 mg/dL 96   Calcium 8.6 - 10.3 mg/dL 9.2   Alkaline Phosphatase 35 - 144 U/L 74   PROTEIN TOTAL 6.1 - 8.1 g/dL 6.5   Albumin 3.6 - 5.1 g/dL 4.4   Albumin/Globulin Ratio 1.0 - 2.5 (calc) 2.1   Uric Acid 4.0 - 8.0 mg/dL 4.4   BILIRUBIN TOTAL 0.2 - 1.2 mg/dL 0.4   AST 10 - 35 U/L 21   ALT 9 - 46 U/L 49 (H)   CRP <8.0 mg/L 12.7 (H)   Globulin, Total 1.9 - 3.7 g/dL (calc) 2.1   Cholesterol <200 mg/dL 197   HDL > OR = 40 mg/dL 36 (L)   HDL/Cholesterol Ratio <5.0 (calc) 5.5 (H)   LDL Cholesterol External mg/dL (calc) 133 (H)   Non HDL Chol. (LDL+VLDL) <130 mg/dL (calc) 161 (H)   Triglycerides <150 mg/dL 152 (H)   Color, UA YELLOW  YELLOW   Appearance, UA CLEAR  CLEAR   Specific Gravity, UA 1.001 - 1.035  1.019   pH, UA 5.0 - 8.0  6.5   Protein, UA NEGATIVE  NEGATIVE   Glucose, UA NEGATIVE  NEGATIVE   Ketones, UA NEGATIVE  NEGATIVE   Occult Blood UA NEGATIVE  NEGATIVE   NITRITE UA NEGATIVE  NEGATIVE   Bilirubin, UA NEGATIVE  NEGATIVE   Leukocytes, UA NEGATIVE  NEGATIVE   Bacteria, UA NONE SEEN /HPF NONE SEEN   Squam Epithel, UA < OR = 5 /HPF NONE SEEN   Hyaline Casts, UA NONE SEEN /LPF NONE SEEN   RBC Casts, UA < OR = 2 /HPF NONE SEEN   WBC Casts, UA < OR = 5 /HPF NONE SEEN   Reflexive Urine Culture  See Comments   Service Cmt:  See Comments   TSH w/reflex to FT4 0.40 - 4.50 mIU/L 1.62   (H): Data is abnormally high  (L): Data is abnormally low    Ankle XR:   FINDINGS:  The osseous structures appear intact without evidence of an acute fracture deformity.  No significant metabolic, inflammatory, nor neoplastic process is  demonstrated.  Soft tissues appear within the range of normal.        IMPRESSION: NEGATIVE STUDY  ankle     XR KNEE:   FINDINGS:Osseous structures appear intact without evidence of an acute fracture deformity. Articular cartilage spaces are well-maintained. No definable soft tissue calcifications, seen in the setting of gouty arthritis. Lateral inspection reveals a prominent traction spurring at the quadriceps tendon insertion. Patellofemoral outline appears well-maintained.     IMPRESSION:  1. No manifestation of gout at the level of the knee.  2. Prominent traction spur at the quadriceps tendon insertion.      XR Wrist:   FINDINGS:  The osseous structures appear intact without evidence of an acute fracture deformity.  No significant metabolic, inflammatory, nor neoplastic process is demonstrated.  Soft tissues appear within the range of normal.        IMPRESSION: NEGATIVE STUDY       Assessment:     Mr. Huerta is a 52 year old male with idiopathic chonic gout who presents to rheumatology clinic as a new patient to establish care. This year he has had increase in number and severity of gout attacks without change in medication.   1. Idiopathic chronic gout of multiple sites without tophus              Plan:       Problem List Items Addressed This Visit          Orthopedic    Idiopathic chronic gout of multiple sites without tophus - Primary    Relevant Orders    Sedimentation rate    C-Reactive Protein    Uric Acid    Rheumatoid Factor    Cyclic Citrullinated Peptide Antibody, IgG    JOSE Screen w/Reflex     Will switch from indomethacin to colchicine as the patient reports he reports better symptom relief with colchicine.   Advised patient to take colchicine twice daily for two week for current gout attack. If the symptoms have not resolved, call the clinic to be seen.   Informed the patient at the start of gout attack to immediately take two tablets of colchicine and another tablet 1 hour later.   Continue  allopurinol 300mg daily   Obtain blood work in 2 weeks for acute gout attack to resolve first.   Topical medications for small joint pain   Can soak hand in warm water for symptom improvement.   RTC in 3 months; will have patient present sooner pending blood work.      I have personally taken the history and examined the patient and concur with the resident's note as above.  He has crystal proven gout.  He is presently on allopurinol 300 mg daily.  Was previously on 400 mg daily.  Uric acid level was normal but this was obtained during an acute attack.  Started with pain and swelling in his PIP 1 week ago.  It is doing better but not resolved.  I will place him on colchicine 0.6 mg twice daily.  He is to continue allopurinol.  He may also use topical medications.  He will have laboratory studies done in 2 weeks.  I will see him in follow-up in 3 months.

## 2022-12-22 NOTE — PROGRESS NOTES
Rapid3 Question Responses and Scores 12/15/2022   MDHAQ Score 0.1   Psychologic Score 3.3   Pain Score 7   When you awakened in the morning OVER THE LAST WEEK, did you feel stiff? Yes   If Yes, please indicate the number of hours until you are as limber as you will be for the day 2   Fatigue Score 2   Global Health Score 5.5   RAPID3 Score 4.28     Answers submitted by the patient for this visit:  Rheumatology Questionnaire (Submitted on 12/15/2022)  fever: No  eye redness: No  mouth sores: No  headaches: No  shortness of breath: No  chest pain: No  trouble swallowing: No  diarrhea: No  constipation: No  unexpected weight change: No  genital sore: No  During the last 3 days, have you had a skin rash?: No  Bruises or bleeds easily: No  cough: No

## 2023-01-06 ENCOUNTER — TELEPHONE (OUTPATIENT)
Dept: RHEUMATOLOGY | Facility: CLINIC | Age: 53
End: 2023-01-06
Payer: COMMERCIAL

## 2023-01-06 NOTE — TELEPHONE ENCOUNTER
Left voicemail asking patient to contact the office back.      ----- Message from Pa Negro MD sent at 1/6/2023  2:04 PM CST -----  Regarding: RE: SAME DAY CALL BACK ASAP  Contact: Patient  The colonoscopy with nonunion she with the blood work since it is just a uric acid level.  Can either do it the same day or reschedule it.  ----- Message -----  From: Roberto Gordon MA  Sent: 1/6/2023   1:39 PM CST  To: Pa Negro MD  Subject: FW: SAME DAY CALL BACK ASAP                      Please advise message  ----- Message -----  From: Becky Lomas  Sent: 1/6/2023   9:21 AM CST  To: Marky ABRAHAM Staff  Subject: SAME DAY CALL BACK ASAP                          Type: Patient Call Back         Who called: Patient         What is the request in detail: calling to see if he needs to resched his blood work today; states he has a colonoscopy sched today @ 11 and was not sure if the blood work would effect his procedure; please advise            Best call back number: [phone]         Additional Information:            Thank You

## 2023-01-23 ENCOUNTER — TELEPHONE (OUTPATIENT)
Dept: FAMILY MEDICINE | Facility: CLINIC | Age: 53
End: 2023-01-23

## 2023-01-23 ENCOUNTER — HOSPITAL ENCOUNTER (OUTPATIENT)
Facility: HOSPITAL | Age: 53
Discharge: HOME | End: 2023-01-24
Attending: EMERGENCY MEDICINE | Admitting: INTERNAL MEDICINE
Payer: COMMERCIAL

## 2023-01-23 ENCOUNTER — TELEPHONE (OUTPATIENT)
Dept: RHEUMATOLOGY | Facility: CLINIC | Age: 53
End: 2023-01-23
Payer: COMMERCIAL

## 2023-01-23 DIAGNOSIS — Z87.39 HX OF GOUT: ICD-10-CM

## 2023-01-23 DIAGNOSIS — M25.562 ACUTE PAIN OF LEFT KNEE: Primary | ICD-10-CM

## 2023-01-23 DIAGNOSIS — R50.9 FEVER: ICD-10-CM

## 2023-01-23 DIAGNOSIS — M10.062 ACUTE IDIOPATHIC GOUT OF LEFT KNEE: ICD-10-CM

## 2023-01-23 DIAGNOSIS — M10.9 ACUTE GOUT OF LEFT KNEE, UNSPECIFIED CAUSE: ICD-10-CM

## 2023-01-23 DIAGNOSIS — R52 PAIN: ICD-10-CM

## 2023-01-23 DIAGNOSIS — M25.569 KNEE PAIN: ICD-10-CM

## 2023-01-23 LAB
ADENOVIRUS: NOT DETECTED
ALBUMIN SERPL BCP-MCNC: 4.7 G/DL (ref 3.5–5.2)
ALP SERPL-CCNC: 78 U/L (ref 55–135)
ALT SERPL W/O P-5'-P-CCNC: 52 U/L (ref 10–44)
ANION GAP SERPL CALC-SCNC: 10 MMOL/L (ref 8–16)
APPEARANCE FLD: NORMAL
AST SERPL-CCNC: 23 U/L (ref 10–40)
BASOPHILS # BLD AUTO: 0.04 K/UL (ref 0–0.2)
BASOPHILS NFR BLD: 0.3 % (ref 0–1.9)
BILIRUB SERPL-MCNC: 0.9 MG/DL (ref 0.1–1)
BILIRUB UR QL STRIP: NEGATIVE
BODY FLD TYPE: NORMAL
BODY FLD TYPE: NORMAL
BORDETELLA PARAPERTUSSIS (IS1001): NOT DETECTED
BORDETELLA PERTUSSIS (PTXP): NOT DETECTED
BUN SERPL-MCNC: 11 MG/DL (ref 6–20)
CALCIUM SERPL-MCNC: 9.4 MG/DL (ref 8.7–10.5)
CHLAMYDIA PNEUMONIAE: NOT DETECTED
CHLORIDE SERPL-SCNC: 100 MMOL/L (ref 95–110)
CLARITY UR: CLEAR
CO2 SERPL-SCNC: 27 MMOL/L (ref 23–29)
COLOR FLD: NORMAL
COLOR UR: YELLOW
CORONAVIRUS 229E, COMMON COLD VIRUS: NOT DETECTED
CORONAVIRUS HKU1, COMMON COLD VIRUS: NOT DETECTED
CORONAVIRUS NL63, COMMON COLD VIRUS: NOT DETECTED
CORONAVIRUS OC43, COMMON COLD VIRUS: NOT DETECTED
CREAT SERPL-MCNC: 1 MG/DL (ref 0.5–1.4)
CRP SERPL-MCNC: 5.25 MG/DL
CRYSTALS FLD MICRO: NEGATIVE
DIFFERENTIAL METHOD: ABNORMAL
EOSINOPHIL # BLD AUTO: 0.4 K/UL (ref 0–0.5)
EOSINOPHIL NFR BLD: 3.6 % (ref 0–8)
ERYTHROCYTE [DISTWIDTH] IN BLOOD BY AUTOMATED COUNT: 13.6 % (ref 11.5–14.5)
ERYTHROCYTE [SEDIMENTATION RATE] IN BLOOD BY WESTERGREN METHOD: 4 MM/HR (ref 0–10)
EST. GFR  (NO RACE VARIABLE): >60 ML/MIN/1.73 M^2
FLUBV RNA NPH QL NAA+NON-PROBE: NOT DETECTED
GLUCOSE SERPL-MCNC: 96 MG/DL (ref 70–110)
GLUCOSE UR QL STRIP: NEGATIVE
GROUP A STREP, MOLECULAR: NEGATIVE
HCT VFR BLD AUTO: 50.8 % (ref 40–54)
HGB BLD-MCNC: 16.7 G/DL (ref 14–18)
HGB UR QL STRIP: ABNORMAL
HPIV1 RNA NPH QL NAA+NON-PROBE: NOT DETECTED
HPIV2 RNA NPH QL NAA+NON-PROBE: NOT DETECTED
HPIV3 RNA NPH QL NAA+NON-PROBE: NOT DETECTED
HPIV4 RNA NPH QL NAA+NON-PROBE: NOT DETECTED
HUMAN METAPNEUMOVIRUS: NOT DETECTED
IMM GRANULOCYTES # BLD AUTO: 0.03 K/UL (ref 0–0.04)
IMM GRANULOCYTES NFR BLD AUTO: 0.2 % (ref 0–0.5)
INFLUENZA A (SUBTYPES H1,H1-2009,H3): NOT DETECTED
INFLUENZA A, MOLECULAR: NEGATIVE
INFLUENZA B, MOLECULAR: NEGATIVE
KETONES UR QL STRIP: ABNORMAL
LACTATE SERPL-SCNC: 1.2 MMOL/L (ref 0.5–1.9)
LEUKOCYTE ESTERASE UR QL STRIP: NEGATIVE
LYMPHOCYTES # BLD AUTO: 2.5 K/UL (ref 1–4.8)
LYMPHOCYTES NFR BLD: 21 % (ref 18–48)
LYMPHOCYTES NFR FLD MANUAL: 8 %
MCH RBC QN AUTO: 28.2 PG (ref 27–31)
MCHC RBC AUTO-ENTMCNC: 32.9 G/DL (ref 32–36)
MCV RBC AUTO: 86 FL (ref 82–98)
MONOCYTES # BLD AUTO: 1.1 K/UL (ref 0.3–1)
MONOCYTES NFR BLD: 8.9 % (ref 4–15)
MONOS+MACROS NFR FLD MANUAL: 3 %
MYCOPLASMA PNEUMONIAE: NOT DETECTED
NEUTROPHILS # BLD AUTO: 8 K/UL (ref 1.8–7.7)
NEUTROPHILS NFR BLD: 66 % (ref 38–73)
NEUTROPHILS NFR FLD MANUAL: 89 %
NITRITE UR QL STRIP: NEGATIVE
NRBC BLD-RTO: 0 /100 WBC
PH UR STRIP: 6 [PH] (ref 5–8)
PLATELET # BLD AUTO: 278 K/UL (ref 150–450)
PMV BLD AUTO: 10.6 FL (ref 9.2–12.9)
POTASSIUM SERPL-SCNC: 3.9 MMOL/L (ref 3.5–5.1)
PROT SERPL-MCNC: 8 G/DL (ref 6–8.4)
PROT UR QL STRIP: NEGATIVE
RBC # BLD AUTO: 5.92 M/UL (ref 4.6–6.2)
RESPIRATORY INFECTION PANEL SOURCE: NORMAL
RSV RNA NPH QL NAA+NON-PROBE: NOT DETECTED
RV+EV RNA NPH QL NAA+NON-PROBE: NOT DETECTED
SARS-COV-2 RDRP RESP QL NAA+PROBE: NEGATIVE
SARS-COV-2 RNA RESP QL NAA+PROBE: NOT DETECTED
SODIUM SERPL-SCNC: 137 MMOL/L (ref 136–145)
SP GR UR STRIP: 1.02 (ref 1–1.03)
SPECIMEN SOURCE: NORMAL
URATE SERPL-MCNC: 7.5 MG/DL (ref 3.4–7)
URN SPEC COLLECT METH UR: ABNORMAL
UROBILINOGEN UR STRIP-ACNC: NEGATIVE EU/DL
WBC # BLD AUTO: 12.07 K/UL (ref 3.9–12.7)
WBC # FLD: 2576 /CU MM

## 2023-01-23 PROCEDURE — 83605 ASSAY OF LACTIC ACID: CPT | Performed by: STUDENT IN AN ORGANIZED HEALTH CARE EDUCATION/TRAINING PROGRAM

## 2023-01-23 PROCEDURE — 85025 COMPLETE CBC W/AUTO DIFF WBC: CPT | Performed by: STUDENT IN AN ORGANIZED HEALTH CARE EDUCATION/TRAINING PROGRAM

## 2023-01-23 PROCEDURE — 25000003 PHARM REV CODE 250: Performed by: EMERGENCY MEDICINE

## 2023-01-23 PROCEDURE — 89060 EXAM SYNOVIAL FLUID CRYSTALS: CPT | Performed by: EMERGENCY MEDICINE

## 2023-01-23 PROCEDURE — 93010 ELECTROCARDIOGRAM REPORT: CPT | Mod: ,,, | Performed by: INTERNAL MEDICINE

## 2023-01-23 PROCEDURE — 89051 BODY FLUID CELL COUNT: CPT | Performed by: EMERGENCY MEDICINE

## 2023-01-23 PROCEDURE — 87798 DETECT AGENT NOS DNA AMP: CPT | Performed by: EMERGENCY MEDICINE

## 2023-01-23 PROCEDURE — 81003 URINALYSIS AUTO W/O SCOPE: CPT | Performed by: EMERGENCY MEDICINE

## 2023-01-23 PROCEDURE — G0378 HOSPITAL OBSERVATION PER HR: HCPCS

## 2023-01-23 PROCEDURE — 93005 ELECTROCARDIOGRAM TRACING: CPT | Performed by: INTERNAL MEDICINE

## 2023-01-23 PROCEDURE — 96367 TX/PROPH/DG ADDL SEQ IV INF: CPT | Mod: 59

## 2023-01-23 PROCEDURE — 99285 EMERGENCY DEPT VISIT HI MDM: CPT | Mod: 25

## 2023-01-23 PROCEDURE — 87502 INFLUENZA DNA AMP PROBE: CPT | Mod: 59 | Performed by: NURSE PRACTITIONER

## 2023-01-23 PROCEDURE — 20610 DRAIN/INJ JOINT/BURSA W/O US: CPT | Mod: LT

## 2023-01-23 PROCEDURE — 87070 CULTURE OTHR SPECIMN AEROBIC: CPT | Mod: 59 | Performed by: EMERGENCY MEDICINE

## 2023-01-23 PROCEDURE — 96375 TX/PRO/DX INJ NEW DRUG ADDON: CPT

## 2023-01-23 PROCEDURE — 87040 BLOOD CULTURE FOR BACTERIA: CPT | Mod: 59 | Performed by: STUDENT IN AN ORGANIZED HEALTH CARE EDUCATION/TRAINING PROGRAM

## 2023-01-23 PROCEDURE — 25000003 PHARM REV CODE 250: Performed by: NURSE PRACTITIONER

## 2023-01-23 PROCEDURE — 84550 ASSAY OF BLOOD/URIC ACID: CPT | Performed by: STUDENT IN AN ORGANIZED HEALTH CARE EDUCATION/TRAINING PROGRAM

## 2023-01-23 PROCEDURE — 25000003 PHARM REV CODE 250: Performed by: STUDENT IN AN ORGANIZED HEALTH CARE EDUCATION/TRAINING PROGRAM

## 2023-01-23 PROCEDURE — 87205 SMEAR GRAM STAIN: CPT | Performed by: EMERGENCY MEDICINE

## 2023-01-23 PROCEDURE — 87633 RESP VIRUS 12-25 TARGETS: CPT | Performed by: EMERGENCY MEDICINE

## 2023-01-23 PROCEDURE — 80053 COMPREHEN METABOLIC PANEL: CPT | Performed by: STUDENT IN AN ORGANIZED HEALTH CARE EDUCATION/TRAINING PROGRAM

## 2023-01-23 PROCEDURE — 85651 RBC SED RATE NONAUTOMATED: CPT | Performed by: STUDENT IN AN ORGANIZED HEALTH CARE EDUCATION/TRAINING PROGRAM

## 2023-01-23 PROCEDURE — U0002 COVID-19 LAB TEST NON-CDC: HCPCS | Performed by: NURSE PRACTITIONER

## 2023-01-23 PROCEDURE — 93010 EKG 12-LEAD: ICD-10-PCS | Mod: ,,, | Performed by: INTERNAL MEDICINE

## 2023-01-23 PROCEDURE — 86140 C-REACTIVE PROTEIN: CPT | Performed by: STUDENT IN AN ORGANIZED HEALTH CARE EDUCATION/TRAINING PROGRAM

## 2023-01-23 PROCEDURE — 87651 STREP A DNA AMP PROBE: CPT | Performed by: EMERGENCY MEDICINE

## 2023-01-23 PROCEDURE — 63600175 PHARM REV CODE 636 W HCPCS: Performed by: EMERGENCY MEDICINE

## 2023-01-23 PROCEDURE — 96365 THER/PROPH/DIAG IV INF INIT: CPT

## 2023-01-23 RX ORDER — ONDANSETRON 4 MG/1
4 TABLET, ORALLY DISINTEGRATING ORAL
Status: COMPLETED | OUTPATIENT
Start: 2023-01-23 | End: 2023-01-23

## 2023-01-23 RX ORDER — ACETAMINOPHEN 325 MG/1
650 TABLET ORAL EVERY 8 HOURS PRN
Status: DISCONTINUED | OUTPATIENT
Start: 2023-01-24 | End: 2023-01-24 | Stop reason: HOSPADM

## 2023-01-23 RX ORDER — TALC
6 POWDER (GRAM) TOPICAL NIGHTLY PRN
Status: DISCONTINUED | OUTPATIENT
Start: 2023-01-24 | End: 2023-01-24 | Stop reason: HOSPADM

## 2023-01-23 RX ORDER — ACETAMINOPHEN 500 MG
1000 TABLET ORAL
Status: COMPLETED | OUTPATIENT
Start: 2023-01-23 | End: 2023-01-23

## 2023-01-23 RX ORDER — ACETAMINOPHEN AND CODEINE PHOSPHATE 300; 30 MG/1; MG/1
1 TABLET ORAL
COMMUNITY
Start: 2022-11-15 | End: 2023-01-24

## 2023-01-23 RX ORDER — ONDANSETRON 2 MG/ML
4 INJECTION INTRAMUSCULAR; INTRAVENOUS
Status: COMPLETED | OUTPATIENT
Start: 2023-01-23 | End: 2023-01-23

## 2023-01-23 RX ORDER — VANCOMYCIN HCL IN 5 % DEXTROSE 1G/250ML
1000 PLASTIC BAG, INJECTION (ML) INTRAVENOUS ONCE
Status: COMPLETED | OUTPATIENT
Start: 2023-01-23 | End: 2023-01-23

## 2023-01-23 RX ORDER — LIDOCAINE HYDROCHLORIDE 10 MG/ML
5 INJECTION, SOLUTION EPIDURAL; INFILTRATION; INTRACAUDAL; PERINEURAL
Status: COMPLETED | OUTPATIENT
Start: 2023-01-23 | End: 2023-01-23

## 2023-01-23 RX ORDER — SODIUM CHLORIDE 0.9 % (FLUSH) 0.9 %
2 SYRINGE (ML) INJECTION EVERY 6 HOURS
Status: DISCONTINUED | OUTPATIENT
Start: 2023-01-24 | End: 2023-01-24 | Stop reason: HOSPADM

## 2023-01-23 RX ORDER — MORPHINE SULFATE 2 MG/ML
INJECTION, SOLUTION INTRAMUSCULAR; INTRAVENOUS
Status: DISCONTINUED
Start: 2023-01-23 | End: 2023-01-23 | Stop reason: WASHOUT

## 2023-01-23 RX ADMIN — ONDANSETRON 4 MG: 2 INJECTION INTRAMUSCULAR; INTRAVENOUS at 07:01

## 2023-01-23 RX ADMIN — ONDANSETRON 4 MG: 4 TABLET, ORALLY DISINTEGRATING ORAL at 01:01

## 2023-01-23 RX ADMIN — ACETAMINOPHEN 1000 MG: 500 TABLET, FILM COATED ORAL at 07:01

## 2023-01-23 RX ADMIN — VANCOMYCIN HYDROCHLORIDE 1000 MG: 1 INJECTION, POWDER, LYOPHILIZED, FOR SOLUTION INTRAVENOUS at 08:01

## 2023-01-23 RX ADMIN — LIDOCAINE HYDROCHLORIDE 50 MG: 10 INJECTION, SOLUTION EPIDURAL; INFILTRATION; INTRACAUDAL; PERINEURAL at 07:01

## 2023-01-23 RX ADMIN — CEFTRIAXONE 1 G: 1 INJECTION, SOLUTION INTRAVENOUS at 08:01

## 2023-01-23 NOTE — TELEPHONE ENCOUNTER
Left voicemail asking patient to contact the office back.        ----- Message from Pa Negro MD sent at 1/23/2023 11:19 AM CST -----  Can he come in at 3:30 today.  The 3:00 patient is a follow-up in only needs 30 minutes, even though I have not seen her for 5 years.  ----- Message -----  From: Roberto Gordon MA  Sent: 1/23/2023  10:36 AM CST  To: Pa Negro MD    Please advise  ----- Message -----  From: Mimi Salas  Sent: 1/23/2023   9:53 AM CST  To: Marky ABRAHAM Staff    Pt is requesting a callback for a same day appt. Pt is currently having a flare of his left knee and stated it's the size of a cane anurag. Please adv pt.       Confirmed contact below:   Contact Name:Shayne Huerta  Phone Number: [phone]

## 2023-01-23 NOTE — FIRST PROVIDER EVALUATION
"Medical screening examination initiated.  I have conducted a focused provider triage encounter, findings are as follows:    Brief history of present illness:  Left knee pain similar to gout pain. Nausea and vomiting also began today. Denies chest pain, SOB. Denies abdominal pain. No fever.     Vitals:    01/23/23 1259   BP: (!) 163/99   BP Location: Left arm   Patient Position: Sitting   Pulse: 92   Resp: 18   Temp: 99 °F (37.2 °C)   TempSrc: Oral   SpO2: 97%   Weight: 113.4 kg (250 lb)   Height: 5' 10" (1.778 m)       Pertinent physical exam:  No abdominal tenderness    Brief workup plan:  Knee xray, covid, flu. Zofran    Preliminary workup initiated; this workup will be continued and followed by the physician or advanced practice provider that is assigned to the patient when roomed.  "

## 2023-01-23 NOTE — TELEPHONE ENCOUNTER
Spoke to pt and he stated he is having a gout attack on his left knee. Pt stated his knee is the size of a canalop. Pt stated he colchicine. Pt stated he is going to call Dr. Negro his rheumatologist who has treated his gout before.

## 2023-01-23 NOTE — Clinical Note
"Shayne"Eduardo Huerta was seen and treated in our emergency department on 1/23/2023.  He may return with limitations on 01/26/2023.  No heavy lifting above 25lbs until 2/1/23     Sincerely,      Brett Seymour MD LPN    "

## 2023-01-23 NOTE — TELEPHONE ENCOUNTER
----- Message from Ida Wilson sent at 1/23/2023  9:07 AM CST -----  Pt is having a gout flare up and would like to know what should he do. [phone]

## 2023-01-23 NOTE — ED PROVIDER NOTES
Encounter Date: 2023       History     Chief Complaint   Patient presents with    Knee Pain     LEFT X 4 DAYS    Headache     HPI  52-year-old male with history of a septic joint and recurrent idiopathic gout presenting for 4 days of worsening left knee redness, pain, and swelling. Takes allopurinol daily and has been taking colchicine for the past 4 days without improvement. Also reports subjective fever 3 days ago, has been taking tylenol for pain/subjective fever. Has been unable to sleep the last 3 days due to the pain. Reports episode of nonbloody vomiting and nausea this morning. Reports pain so severe he is unable to bear weight without using cane, and reports that it feels similar to the previous time he has septic arthritis.    Denies trauma, medication changes, alcohol use, does not eat much red meat, no significant increase in seafood consumption. Reports that he had fluid drained from his knee many years ago and that led to his diagnosis of gout. Tried calling his rheumatologist at [facility] but they were unable to see him as soon as he would like.  Review of patient's allergies indicates:  No Known Allergies  Past Medical History:   Diagnosis Date    COVID-19     Gout, unspecified      Past Surgical History:   Procedure Laterality Date    FINGER SURGERY      finger laceration- near amputation    KNEE SURGERY Left     scope    ROTATOR CUFF REPAIR Right     TONSILLECTOMY       Family History   Problem Relation Age of Onset    Cancer Father         Lung Ca     Social History     Tobacco Use    Smoking status: Former     Packs/day: 1.00     Years: 15.00     Pack years: 15.00     Types: Cigarettes     Quit date: 2011     Years since quittin.0    Smokeless tobacco: Never   Substance Use Topics    Alcohol use: No    Drug use: No     Review of Systems   Musculoskeletal:  Positive for joint swelling.     Physical Exam     Initial Vitals [23 1259]   BP Pulse Resp Temp SpO2   (!)  163/99 92 18 99 °F (37.2 °C) 97 %      MAP       --         Physical Exam    Nursing note and vitals reviewed.  Constitutional: He appears well-developed and well-nourished. He is not diaphoretic. No distress. He is not intubated.   HENT:   Head: Normocephalic and atraumatic.   Eyes: Conjunctivae and EOM are normal.   Neck:   Normal range of motion.  Cardiovascular:  Normal rate.           Pulmonary/Chest: No accessory muscle usage. No tachypnea. He is not intubated. No respiratory distress.   Abdominal: Abdomen is soft and flat. He exhibits no distension. There is no abdominal tenderness.   Musculoskeletal:         General: No edema. Normal range of motion.      Cervical back: Normal and normal range of motion. No spasms, tenderness or bony tenderness. No pain with movement. Normal range of motion.      Thoracic back: Normal. No spasms, tenderness or bony tenderness. Normal range of motion.      Lumbar back: Normal. No spasms, tenderness or bony tenderness. Normal range of motion.      Left knee: Swelling and effusion present. Tenderness present over the medial joint line.      Comments: Left knee: medial joint line tenderness, knee is warm. No obvious puncture sites. Patient able to bear weight with cane but unable to apply full body weight to left knee unassisted.      Neurological: He is alert and oriented to person, place, and time. No cranial nerve deficit.   Skin: Skin is warm. No rash noted.   Psychiatric: His behavior is normal.     PGY-3 MDM:  Shayne Huerta Jr. is a 52 y.o. year old male with hx idiopathic chronic gout and remote history of septic joint presenting for 4 days worsening left knee pain and swelling despite taking daily allopurinol and colchicine for the past 4 days.    On arrival patient vitals were   Vitals:    01/24/23 1130   BP: (!) 145/69   Pulse: 88   Resp:    Temp:         On examination, patient overall appears well, left knee with medial joint line tenderness, left knee with  swelling and warmth. Knee though tender, is not exquisitely tender as would be expected with a septic joint.    Left knee xray with medial tibiofem joint space narrowing with small osteophytes, no fracture/dislocation, suprapatellar joint effusion present. Metallic foreign bodies present in thigh soft tissues, unchanged from 12/7/2017.    Labs ordered include CBC, CMP, ESR, CRP, lactic, blood cultures.   Normal WBC count, CMP within acceptable limits. ESR normal. CRP elevated, but this may be seen with acute gout attack as well.  Covid and flu negative.    Patient was given the following in the emergency department and reported improvement in  symptoms.   Medications   ondansetron disintegrating tablet 4 mg (4 mg Oral Given 1/23/23 1310)   acetaminophen tablet 1,000 mg (1,000 mg Oral Given 1/23/23 1904)   ondansetron injection 4 mg (4 mg Intravenous Given 1/23/23 1905)   LIDOcaine (PF) 10 mg/ml (1%) injection 50 mg (50 mg Infiltration Given 1/23/23 1915)   cefTRIAXone (ROCEPHIN) 1 g/50 mL D5W IVPB (0 g Intravenous Stopped 1/23/23 2041)   vancomycin in dextrose 5 % 1 gram/250 mL IVPB 1,000 mg (0 mg Intravenous Stopped 1/23/23 2206)     And   vancomycin in dextrose 5 % 1 gram/250 mL IVPB 1,000 mg (0 mg Intravenous Stopped 1/23/23 2206)        Previous notes were reviewed from patient's rheumatologist from 12/22/2022, has been managed with daily allopurinol 300mg and colchicine PRN during attacks for the past 20 years. Patient is having multiple gouty attacks per year despite these medications.    Orthopedic surgery consulted at 6:35 for concern for possible septic joint. Ortho returned call at 6:45 and recommended joint aspiration and fluid analysis. Arthrocentesis performed with serosang fluid returned. Fluid without crystals on examination. Synovial fluid WBC count 2576, unlikely to be septic joint. Given patient difficulty ambulating and low-grade fever, would like to admit patient for further antibiotics until  joint fluid cultures return. Given vancomycin and ceftriaxone. Orthopedic surgeon on-call was messaged on plan-of-action and patient was consulted to hospital medicine for admission.      Baudilio Vargas  [employer] Emergency Medicine PGY-2  9:18 PM 02/07/2023      ED Course   Arthrocentesis    Date/Time: 1/23/2023 7:45 PM  Location procedure was performed: [facility] EMERGENCY DEPARTMENT  Performed by: Baudilio Vargas MD  Authorized by: Cynthia Phelan MD   Assisting provider: Cynthia Phelan MD  Consent Done: Yes  Consent: Verbal consent obtained.  Risks and benefits: risks, benefits and alternatives were discussed  Consent given by: patient  Patient understanding: patient states understanding of the procedure being performed  Patient consent: the patient's understanding of the procedure matches consent given  Imaging studies: imaging studies available  Patient identity confirmed: MRN, name and verbally with patient  Indications: possible septic joint   Body area: knee  Joint: left knee  Local anesthesia used: yes    Anesthesia:  Local anesthesia used: yes  Local Anesthetic: lidocaine 1% without epinephrine  Anesthetic total: 3 mL    Patient sedated: no  Description of findings: serosanguinous fluid   Preparation: Patient was prepped and draped in the usual sterile fashion.  Needle size: 18 G  Approach: medial  Aspirate amount: 10 mL  Aspirate: serous and bloody  Patient tolerance: Patient tolerated the procedure well with no immediate complications  Specimens: Yes  Implants: No      Labs Reviewed   CBC W/ AUTO DIFFERENTIAL - Abnormal; Notable for the following components:       Result Value    Gran # (ANC) 8.0 (*)     Mono # 1.1 (*)     All other components within normal limits   COMPREHENSIVE METABOLIC PANEL - Abnormal; Notable for the following components:    ALT 52 (*)     All other components within normal limits   C-REACTIVE PROTEIN - Abnormal; Notable for the following components:    CRP 5.25 (*)     All other  components within normal limits   URIC ACID - Abnormal; Notable for the following components:    Uric Acid 7.5 (*)     All other components within normal limits   URINALYSIS, REFLEX TO URINE CULTURE - Abnormal; Notable for the following components:    Ketones, UA 1+ (*)     Occult Blood UA Trace (*)     All other components within normal limits    Narrative:     Specimen Source->Urine   CBC W/ AUTO DIFFERENTIAL - Abnormal; Notable for the following components:    Mono # 1.3 (*)     Eos # 0.7 (*)     All other components within normal limits   BASIC METABOLIC PANEL - Abnormal; Notable for the following components:    Glucose 115 (*)     All other components within normal limits   VANCOMYCIN, TROUGH - Abnormal; Notable for the following components:    Vancomycin-Trough 5.7 (*)     All other components within normal limits   CULTURE, BLOOD   CULTURE, BLOOD   GROUP A STREP, MOLECULAR   RESPIRATORY INFECTION PANEL (PCR), NASOPHARYNGEAL    Narrative:     Respiratory Infection Panel source->NP Swab   CULTURE, FLUID  (AEROBIC) WITH GRAM STAIN   SARS-COV-2 RNA AMPLIFICATION, QUAL   INFLUENZA A AND B ANTIGEN    Narrative:     Specimen Source->Nasopharyngeal Swab   SEDIMENTATION RATE   LACTIC ACID, PLASMA   WBC & DIFF, BODY FLUID    Narrative:     Specimen Source->Synovial Fluid   BODY FLUID CRYSTAL    Narrative:     Specimen Source->Joint Fluid, Left Knee        ECG Results              EKG 12-lead (Final result)  Result time 01/28/23 10:06:24      Final result by Interface, Lab In [facility] (01/28/23 10:06:24)                   Narrative:    Test Reason : M25.569,    Vent. Rate : 098 BPM     Atrial Rate : 098 BPM     P-R Int : 174 ms          QRS Dur : 106 ms      QT Int : 338 ms       P-R-T Axes : 052 028 034 degrees     QTc Int : 431 ms    Normal sinus rhythm  Normal ECG  When compared with ECG of 08-DEC-2019 04:33,  Vent. rate has increased BY  45 BPM  Confirmed by Ruba GUIDO, Antonio ([id number]) on 1/28/2023 10:06:13  AM    Referred By: MOLINA   SELF           Confirmed By:Antonio Yeh MD                                  Imaging Results              X-Ray Chest AP Portable (Final result)  Result time 01/24/23 06:42:53   Procedure changed from X-Ray Chest PA And Lateral     Final result by Marsha Cordova MD (01/24/23 06:42:53)                   Narrative:    XR CHEST 1 VIEW    CLINICAL HISTORY:  52 years Male fever    COMPARISON: 12/9/2018    FINDINGS: Cardiomediastinal silhouette is within normal limits. Lungs are normally expanded with no airspace consolidation. No pleural effusion or pneumothorax. No acute osseous abnormality.    IMPRESSION: No acute pulmonary process.    Electronically signed by:  Marsha Cordova MD  1/24/2023 6:42 AM CST Workstation: [id number]                                     X-Ray Knee Complete 4 or more Views Left (Final result)  Result time 01/23/23 14:38:43   Procedure changed from X-Ray Knee 3 View Left     Final result by Teddy John MD (01/23/23 14:38:43)                   Narrative:    HISTORY: Left knee pain.    FINDINGS: 4 views of the left knee show no acute fracture, dislocation or destructive osseous lesion. There is medial tibiofemoral compartment joint space narrowing, with small patellofemoral and tibiofemoral osteophytes. No periarticular erosions. Bone mineralization is normal, with a suprapatellar knee joint effusion. There are 2 linear metallic foreign bodies within the anteromedial distal thigh soft tissues, unchanged from 12/07/2017.    IMPRESSION:  1. Medial tibiofemoral compartment joint space narrowing with osteophytes.  2. Knee joint effusion.    Electronically signed by:  Teddy John MD  1/23/2023 2:38 PM CST Workstation: [id number]                                     Medications   ondansetron disintegrating tablet 4 mg (4 mg Oral Given 1/23/23 1310)   acetaminophen tablet 1,000 mg (1,000 mg Oral Given 1/23/23 1904)   ondansetron injection 4 mg (4 mg Intravenous  Given 1/23/23 1905)   LIDOcaine (PF) 10 mg/ml (1%) injection 50 mg (50 mg Infiltration Given 1/23/23 1915)   cefTRIAXone (ROCEPHIN) 1 g/50 mL D5W IVPB (0 g Intravenous Stopped 1/23/23 2041)   vancomycin in dextrose 5 % 1 gram/250 mL IVPB 1,000 mg (0 mg Intravenous Stopped 1/23/23 2206)     And   vancomycin in dextrose 5 % 1 gram/250 mL IVPB 1,000 mg (0 mg Intravenous Stopped 1/23/23 2206)     Medical Decision Making:   History:   I obtained history from: someone other than patient.       <> Summary of History: Wife - elaborated on patient's gout history and history of septic joint requiring washout.  Old Records Summarized: records from clinic visits.       <> Summary of Records: Previous notes were reviewed from patient's rheumatologist from 12/22/2022, has been managed with daily allopurinol 300mg and colchicine PRN during attacks for the past 20 years. Patient is having multiple gouty attacks per year despite these medications.    Independently Interpreted Test(s):   I have ordered and independently interpreted X-rays - see prior notes.  Clinical Tests:   Lab Tests: Ordered and Reviewed  The following lab test(s) were unremarkable: CBC, CMP and Lactate       <> Summary of Lab: Normal WBC count, CMP within acceptable limits. ESR normal. CRP elevated, but this may be seen with acute gout attack as well.  Radiological Study: Ordered and Reviewed          Attending Attestation:   Physician Attestation Statement for Resident:  As the supervising MD   Physician Attestation Statement: I have personally seen and examined this patient.   I agree with the above history.  -:   As the supervising MD I agree with the above PE.     As the supervising MD I agree with the above treatment, course, plan, and disposition.   I was personally present during the entire procedure.  I have reviewed and agree with the residents interpretation of the following: lab data, x-rays, CT scans and EKG.               ED Course as of 02/07/23  1412   Mon Jan 23, 2023 2136 Segs, Fluid: 89 [AR]      ED Course User Index  [AR] Cynthia Phelan MD                 Clinical Impression:   Final diagnoses:  [R52] Pain  [M25.562] Acute pain of left knee (Primary)  [Z87.39] Hx of gout  [M25.569] Knee pain  [R50.9] Fever        ED Disposition Condition    Observation                 Baudilio Vargas MD  Resident  01/23/23 2310       Cynthia Phelan MD  02/07/23 1412

## 2023-01-24 VITALS
HEIGHT: 70 IN | WEIGHT: 250 LBS | RESPIRATION RATE: 20 BRPM | TEMPERATURE: 98 F | DIASTOLIC BLOOD PRESSURE: 69 MMHG | HEART RATE: 88 BPM | OXYGEN SATURATION: 92 % | BODY MASS INDEX: 35.79 KG/M2 | SYSTOLIC BLOOD PRESSURE: 145 MMHG

## 2023-01-24 LAB
ANION GAP SERPL CALC-SCNC: 11 MMOL/L (ref 8–16)
BASOPHILS # BLD AUTO: 0.03 K/UL (ref 0–0.2)
BASOPHILS NFR BLD: 0.3 % (ref 0–1.9)
BUN SERPL-MCNC: 12 MG/DL (ref 6–20)
CALCIUM SERPL-MCNC: 9.2 MG/DL (ref 8.7–10.5)
CHLORIDE SERPL-SCNC: 101 MMOL/L (ref 95–110)
CO2 SERPL-SCNC: 26 MMOL/L (ref 23–29)
CREAT SERPL-MCNC: 0.9 MG/DL (ref 0.5–1.4)
DIFFERENTIAL METHOD: ABNORMAL
EOSINOPHIL # BLD AUTO: 0.7 K/UL (ref 0–0.5)
EOSINOPHIL NFR BLD: 6.3 % (ref 0–8)
ERYTHROCYTE [DISTWIDTH] IN BLOOD BY AUTOMATED COUNT: 13.5 % (ref 11.5–14.5)
EST. GFR  (NO RACE VARIABLE): >60 ML/MIN/1.73 M^2
GLUCOSE SERPL-MCNC: 115 MG/DL (ref 70–110)
HCT VFR BLD AUTO: 46.6 % (ref 40–54)
HGB BLD-MCNC: 15.6 G/DL (ref 14–18)
IMM GRANULOCYTES # BLD AUTO: 0.03 K/UL (ref 0–0.04)
IMM GRANULOCYTES NFR BLD AUTO: 0.3 % (ref 0–0.5)
LYMPHOCYTES # BLD AUTO: 2.2 K/UL (ref 1–4.8)
LYMPHOCYTES NFR BLD: 20 % (ref 18–48)
MCH RBC QN AUTO: 28.3 PG (ref 27–31)
MCHC RBC AUTO-ENTMCNC: 33.5 G/DL (ref 32–36)
MCV RBC AUTO: 85 FL (ref 82–98)
MONOCYTES # BLD AUTO: 1.3 K/UL (ref 0.3–1)
MONOCYTES NFR BLD: 11.6 % (ref 4–15)
NEUTROPHILS # BLD AUTO: 6.9 K/UL (ref 1.8–7.7)
NEUTROPHILS NFR BLD: 61.5 % (ref 38–73)
NRBC BLD-RTO: 0 /100 WBC
PLATELET # BLD AUTO: 279 K/UL (ref 150–450)
PMV BLD AUTO: 10.7 FL (ref 9.2–12.9)
POTASSIUM SERPL-SCNC: 3.9 MMOL/L (ref 3.5–5.1)
RBC # BLD AUTO: 5.51 M/UL (ref 4.6–6.2)
SODIUM SERPL-SCNC: 138 MMOL/L (ref 136–145)
VANCOMYCIN TROUGH SERPL-MCNC: 5.7 UG/ML
WBC # BLD AUTO: 11.16 K/UL (ref 3.9–12.7)

## 2023-01-24 PROCEDURE — 99204 OFFICE O/P NEW MOD 45 MIN: CPT | Mod: ,,, | Performed by: INTERNAL MEDICINE

## 2023-01-24 PROCEDURE — 63600175 PHARM REV CODE 636 W HCPCS: Performed by: NURSE PRACTITIONER

## 2023-01-24 PROCEDURE — 25000003 PHARM REV CODE 250: Performed by: INTERNAL MEDICINE

## 2023-01-24 PROCEDURE — 85025 COMPLETE CBC W/AUTO DIFF WBC: CPT | Performed by: NURSE PRACTITIONER

## 2023-01-24 PROCEDURE — 80048 BASIC METABOLIC PNL TOTAL CA: CPT | Performed by: NURSE PRACTITIONER

## 2023-01-24 PROCEDURE — 99204 PR OFFICE/OUTPT VISIT, NEW, LEVL IV, 45-59 MIN: ICD-10-PCS | Mod: ,,, | Performed by: INTERNAL MEDICINE

## 2023-01-24 PROCEDURE — G0378 HOSPITAL OBSERVATION PER HR: HCPCS

## 2023-01-24 PROCEDURE — A4216 STERILE WATER/SALINE, 10 ML: HCPCS | Performed by: NURSE PRACTITIONER

## 2023-01-24 PROCEDURE — 36415 COLL VENOUS BLD VENIPUNCTURE: CPT | Performed by: NURSE PRACTITIONER

## 2023-01-24 PROCEDURE — 96366 THER/PROPH/DIAG IV INF ADDON: CPT

## 2023-01-24 PROCEDURE — 63600175 PHARM REV CODE 636 W HCPCS: Performed by: INTERNAL MEDICINE

## 2023-01-24 PROCEDURE — 25000003 PHARM REV CODE 250: Performed by: NURSE PRACTITIONER

## 2023-01-24 PROCEDURE — 80202 ASSAY OF VANCOMYCIN: CPT | Performed by: NURSE PRACTITIONER

## 2023-01-24 RX ORDER — INDOMETHACIN 50 MG/1
50 CAPSULE ORAL 3 TIMES DAILY
Qty: 90 CAPSULE | Refills: 1 | Status: SHIPPED | OUTPATIENT
Start: 2023-01-24 | End: 2023-03-25

## 2023-01-24 RX ORDER — ACETAMINOPHEN AND CODEINE PHOSPHATE 300; 30 MG/1; MG/1
1 TABLET ORAL EVERY 6 HOURS PRN
Status: DISCONTINUED | OUTPATIENT
Start: 2023-01-24 | End: 2023-01-24 | Stop reason: HOSPADM

## 2023-01-24 RX ORDER — COLCHICINE 0.6 MG/1
0.6 TABLET, FILM COATED ORAL 2 TIMES DAILY
Status: DISCONTINUED | OUTPATIENT
Start: 2023-01-24 | End: 2023-01-24 | Stop reason: HOSPADM

## 2023-01-24 RX ORDER — INDOMETHACIN 25 MG/1
50 CAPSULE ORAL 3 TIMES DAILY
Status: DISCONTINUED | OUTPATIENT
Start: 2023-01-24 | End: 2023-01-24 | Stop reason: HOSPADM

## 2023-01-24 RX ORDER — ERGOCALCIFEROL (VITAMIN D2) 200 MCG/ML
50000 DROPS ORAL
Status: DISCONTINUED | OUTPATIENT
Start: 2023-01-24 | End: 2023-01-24 | Stop reason: HOSPADM

## 2023-01-24 RX ORDER — ALLOPURINOL 300 MG/1
300 TABLET ORAL DAILY
Status: DISCONTINUED | OUTPATIENT
Start: 2023-01-24 | End: 2023-01-24 | Stop reason: HOSPADM

## 2023-01-24 RX ADMIN — Medication 50000 UNITS: at 09:01

## 2023-01-24 RX ADMIN — ALLOPURINOL 300 MG: 300 TABLET ORAL at 08:01

## 2023-01-24 RX ADMIN — CEFTRIAXONE 1 G: 1 INJECTION, SOLUTION INTRAVENOUS at 09:01

## 2023-01-24 RX ADMIN — VANCOMYCIN HYDROCHLORIDE 1750 MG: 500 INJECTION, POWDER, LYOPHILIZED, FOR SOLUTION INTRAVENOUS at 10:01

## 2023-01-24 RX ADMIN — Medication 6 MG: at 12:01

## 2023-01-24 RX ADMIN — SODIUM CHLORIDE, PRESERVATIVE FREE 2 ML: 5 INJECTION INTRAVENOUS at 06:01

## 2023-01-24 RX ADMIN — INDOMETHACIN 50 MG: 25 CAPSULE ORAL at 02:01

## 2023-01-24 RX ADMIN — INDOMETHACIN 50 MG: 25 CAPSULE ORAL at 08:01

## 2023-01-24 RX ADMIN — COLCHICINE 0.6 MG: 0.6 TABLET, FILM COATED ORAL at 08:01

## 2023-01-24 NOTE — CONSULTS
.Consult Note  Infectious Disease    Reason for Consult:  Septic joint?    HPI: Shayne Huerta Jr. is a 52 y.o. male With a history of an acute gout attack of the left knee which was washed out, for the suspicion of joint sepsis but was culture negative, and crystal positive, 2017, presented to the emergency room yesterday afternoon with worsening left knee redness, pain and swelling with a fever and myalgias 5 days ago which has persisted despite colchicine and allopurinol.  He reported episode of nonbloody vomiting prior to admission and that he was unable to ambulate on the involved leg.  He had a temperature of 99° and his examination in the emergency room was described as not as tender as would be expected with a septic joint.  An x-ray demonstrated joint space narrowing and a suprapatellar joint effusion.  A joint aspiration was performed with return of serosanguineous fluid, negative crystals and a white blood cell count of 2576 which is unlikely to represent a septic joint.  He was given vancomycin and ceftriaxone empirically he was admitted to Hospital Medicine.  Blood cultures and joint fluid cultures are negative.  White blood cells are normal. Uric acid is 7.5.    Review of patient's allergies indicates:  No Known Allergies  Past Medical History:   Diagnosis Date    COVID-19     Gout, unspecified    Left knee Joint washout 2017 for gout    Past Surgical History:   Procedure Laterality Date    FINGER SURGERY      finger laceration- near amputation    KNEE SURGERY Left     scope    ROTATOR CUFF REPAIR Right     TONSILLECTOMY       Social History     Socioeconomic History    Marital status:    Tobacco Use    Smoking status: Former     Packs/day: 1.00     Years: 15.00     Pack years: 15.00     Types: Cigarettes     Quit date: 2011     Years since quittin.9    Smokeless tobacco: Never   Substance and Sexual Activity    Alcohol use: No    Drug use: No    Sexual  activity: Yes     Family History   Problem Relation Age of Onset    Cancer Father         Lung Ca         Review of Systems:   Subjective fever with sweats  No change in vision,    No sinus congestion, purulent nasal discharge,   No pain in mouth or throat. No problems with teeth, gums.  No chest pain,   No cough, sputum production, shortness of breath,   1 episode of nausea, vomiting, without diarrhea,   or focal abd pain,  had a colonoscopy 1/8 with removal of 1 polyp and no fever thereafter  No dysuria,    Excluding left knee No swelling of joints, redness of joints, injuries, or new focal pain  No unusual headaches,     No diabetes,   No bleeding, lymphadenopathy, anemia, malignancy, unusual bruising  No new rashes,   Last use of steroids for gout was December     Travel:   Implants:  None  Antibiotic History:  See HPI    EXAM & DIAGNOSTICS REVIEWED:   Vitals:     Temp:  [98.2 °F (36.8 °C)-98.9 °F (37.2 °C)]   Temp: 98.2 °F (36.8 °C) (01/24/23 0701)  Pulse: 88 (01/24/23 1130)  Resp: 20 (01/24/23 0701)  BP: (!) 145/69 (01/24/23 1130)  SpO2: (!) 92 % (01/24/23 1130)    Intake/Output Summary (Last 24 hours) at 1/24/2023 1555  Last data filed at 1/23/2023 2206  Gross per 24 hour   Intake 550 ml   Output --   Net 550 ml       General:  In NAD. Alert and attentive, cooperative, comfortable, nontoxic  Eyes:  Anicteric,   EOMI  ENT:  No ulcers, exudates, thrush, nares patent, dentition is good  Neck:  supple, no masses or adenopathy appreciated  Lungs: Clear, no consolidation, rales, wheezes, rub  Heart:  RRR, no gallop/murmur/rub noted  Abd:  Soft, NT, ND, normal BS, no masses or organomegaly appreciated.  :  Voids/  no flank tenderness  Musc:  Left knee with mild joint swelling.  Range of motion is good enough to exclude septic arthritis  Skin:  No rashes. No palmar or plantar lesions. No subungual petechiae  Neuro:             Alert, attentive, speech fluent, face symmetric, moves all extremities, no focal  weakness. Ambulatory  Psych: Calm, cooperative  Lymphatic:     No cervical, supraclavicular,  nodes  Extrem: No edema, erythema, phlebitis, cellulitis, warm and well perfused  VAD:  Peripheral     Isolation:  None  Wound:        General Labs reviewed:  Recent Labs   Lab 01/23/23  1633 01/24/23  0335   WBC 12.07 11.16   HGB 16.7 15.6   HCT 50.8 46.6    279       Recent Labs   Lab 01/23/23  1633 01/24/23  0335    138   K 3.9 3.9    101   CO2 27 26   BUN 11 12   CREATININE 1.0 0.9   CALCIUM 9.4 9.2   PROT 8.0  --    BILITOT 0.9  --    ALKPHOS 78  --    ALT 52*  --    AST 23  --      Recent Labs   Lab 01/23/23 1633   CRP 5.25*      Latest Reference Range & Units 01/23/23 18:23 01/23/23 20:01   Fluid Color  Red    Fluid Appearance  Bloody    WBC, Body Fluid /cu mm 2576    Body Fluid Type  Synovial    Segs, Fluid % 89    Lymphs, Fluid % 8    Monocytes/Macrophages, Fluid % 3    Body Fluid Crystal Negative   Negative   Body Fluid Source, Crystal Exam   Synovial       Micro:  Microbiology Results (last 7 days)       Procedure Component Value Units Date/Time    Culture, Body Fluid (Aerobic) w/ GS [[id number]] Collected: 01/23/23 1945    Order Status: Completed Specimen: Joint Fluid from Knee, Left Updated: 01/24/23 1112     AEROBIC CULTURE - FLUID No growth     Gram Stain Result Few WBC's      No organisms seen    Blood culture #1 **CANNOT BE ORDERED STAT** [[id number]] Collected: 01/23/23 1646    Order Status: Completed Specimen: Blood from Peripheral, Antecubital, Right Updated: 01/23/23 2358     Blood Culture, Routine No Growth to date    Blood culture #2 **CANNOT BE ORDERED STAT** [[id number]] Collected: 01/23/23 1634    Order Status: Completed Specimen: Blood from Peripheral, Antecubital, Left Updated: 01/23/23 2358     Blood Culture, Routine No Growth to date    Respiratory Infection Panel (PCR), Nasopharyngeal [[id number]] Collected: 01/23/23 1823    Order Status: Completed Specimen: Nasopharyngeal Swab  Updated: 01/23/23 2132     Respiratory Infection Panel Source NP swab     Adenovirus Not Detected     Coronavirus 229E, Common Cold Virus Not Detected     Coronavirus HKU1, Common Cold Virus Not Detected     Coronavirus NL63, Common Cold Virus Not Detected     Coronavirus OC43, Common Cold Virus Not Detected     Comment: The Coronavirus strains detected in this test cause the common cold.  These strains are not the COVID-19 (novel Coronavirus)strain   associated with the respiratory disease outbreak.          SARS-CoV2 (COVID-19) Qualitative PCR Not Detected     Human Metapneumovirus Not Detected     Human Rhinovirus/Enterovirus Not Detected     Influenza A (subtypes H1, H1-2009,H3) Not Detected     Influenza B Not Detected     Parainfluenza Virus 1 Not Detected     Parainfluenza Virus 2 Not Detected     Parainfluenza Virus 3 Not Detected     Parainfluenza Virus 4 Not Detected     Respiratory Syncytial Virus Not Detected     Bordetella Parapertussis (ZB1305) Not Detected     Bordetella pertussis (ptxP) Not Detected     Chlamydia pneumoniae Not Detected     Mycoplasma pneumoniae Not Detected     Comment: Respiratory Infection Panel testing performed by Multiplex PCR.       Narrative:      Respiratory Infection Panel source->NP Swab    Group A Strep, Molecular [[id number]] Collected: 01/23/23 1914    Order Status: Completed Specimen: Throat Updated: 01/23/23 2006     Group A Strep, Molecular Negative     Comment: Arcanobacterium haemolyticum and Beta Streptococcus group C   and G will not be detected by this test method.  Please order   Throat Culture ([id number]) if suspected.                 Imaging Reviewed:   Knee x-ray  Cardiology:    IMPRESSION & PLAN   1.  Likely acute gout in the left knee   No evidence to support septic arthritis  2.  Hyperuricemia      Recommendations:  Antibiotics can be discontinued and from my standpoint he can be discharged on typical gout treatment.  Not opposed to 1 dose of corticosteroids  but he has had significant improvement in his pain on colchicine and indomethacin    Medical Decision Making during this encounter was  [_] Low Complexity  [_] Moderate Complexity  [ x ] High Complexity  Discussed with Dr. Seymuor

## 2023-01-24 NOTE — PROGRESS NOTES
VANCOMYCIN PHARMACOKINETIC NOTE:  Vancomycin Day # 1    Objective/Assessment:    Diagnosis/Indication for Vancomycin:Bone/Joint infection      52 y.o., male; Actual Body Weight = 113.4 kg (250 lb).    The patient has the following labs:  1/24/2023 Estimated Creatinine Clearance: 122.5 mL/min (based on SCr of 0.9 mg/dL). Lab Results   Component Value Date    BUN 12 01/24/2023     Lab Results   Component Value Date    WBC 11.16 01/24/2023          Plan:  Adjust vancomycin dose and/or frequency based on the patient's actual weight and renal function:  Initiate Vancomycin 1750 mg IV every 12 hours.  Orders have been entered into patient's chart.        Vancomycin trough level has been ordered for 1/25 @ 0800    Pharmacy will manage vancomycin therapy, monitor serum vancomycin levels, monitor renal function and adjust regimen as necessary.    Thank you for allowing us to participate in this patient's care.     Otoniel Samaniego 1/24/2023   Department of Pharmacy  Ext [phone]

## 2023-01-24 NOTE — ASSESSMENT & PLAN NOTE
Left knee elevated prn  ICE TID to left knee  Patient may benefit from steroid dose lorraine  Awaiting follow-up with ortho

## 2023-01-24 NOTE — SUBJECTIVE & OBJECTIVE
Past Medical History:   Diagnosis Date    COVID-19     Gout, unspecified        Past Surgical History:   Procedure Laterality Date    FINGER SURGERY      finger laceration- near amputation    KNEE SURGERY Left     scope    ROTATOR CUFF REPAIR Right     TONSILLECTOMY         Review of patient's allergies indicates:  No Known Allergies    No current facility-administered medications on file prior to encounter.     Current Outpatient Medications on File Prior to Encounter   Medication Sig    acetaminophen-codeine 300-30mg (TYLENOL #3) 300-30 mg Tab Take 1 tablet by mouth.    allopurinoL (ZYLOPRIM) 300 MG tablet Take 300 mg by mouth once daily. Take 1 tablet by mouth daily    colchicine (COLCRYS) 0.6 mg tablet Take 1 tablet (0.6 mg total) by mouth 2 (two) times daily.    ergocalciferol (ERGOCALCIFEROL) 50,000 unit Cap TAKE 1 CAPSULE BY MOUTH EVERY 7 DAYS (Patient not taking: Reported on 2022)    indomethacin (INDOCIN) 50 MG capsule Take 50 mg by mouth 3 (three) times daily. Take 1 capsules by mouth three times daily as needed for pain     Family History       Problem Relation (Age of Onset)    Cancer Father          Tobacco Use    Smoking status: Former     Packs/day: 1.00     Years: 15.00     Pack years: 15.00     Types: Cigarettes     Quit date: 2011     Years since quittin.9    Smokeless tobacco: Never   Substance and Sexual Activity    Alcohol use: No    Drug use: No    Sexual activity: Yes     Review of Systems   Constitutional:  Negative for chills, diaphoresis and fatigue.   HENT:  Negative for congestion, ear pain, sinus pain and sore throat.    Respiratory:  Negative for cough, choking, chest tightness and shortness of breath.    Cardiovascular:  Negative for chest pain, palpitations and leg swelling.   Gastrointestinal:  Negative for abdominal distention, abdominal pain, blood in stool, nausea and vomiting.   Endocrine: Negative for cold intolerance and heat intolerance.   Genitourinary:   Negative for dysuria, enuresis, flank pain and hematuria.   Musculoskeletal:  Positive for arthralgias, gait problem and joint swelling.   Skin:  Negative for color change, pallor and rash.   Neurological:  Negative for dizziness, facial asymmetry, light-headedness and headaches.   Hematological:  Negative for adenopathy. Does not bruise/bleed easily.   Psychiatric/Behavioral:  Negative for agitation, behavioral problems and confusion.    All other systems reviewed and are negative.  Objective:     Vital Signs (Most Recent):  Temp: 99 °F (37.2 °C) (01/23/23 1259)  Pulse: 103 (01/23/23 2129)  Resp: 20 (01/23/23 1959)  BP: (!) 162/94 (01/23/23 2129)  SpO2: (!) 92 % (01/23/23 2129)   Vital Signs (24h Range):  Temp:  [99 °F (37.2 °C)] 99 °F (37.2 °C)  Pulse:  [] 103  Resp:  [16-20] 20  SpO2:  [92 %-98 %] 92 %  BP: (162-189)/(89-99) 162/94     Weight: 113.4 kg (250 lb)  Body mass index is 35.87 kg/m².    Physical Exam  Vitals and nursing note reviewed.   Constitutional:       Appearance: Normal appearance.   HENT:      Head: Normocephalic and atraumatic.      Right Ear: Tympanic membrane, ear canal and external ear normal.      Left Ear: Tympanic membrane, ear canal and external ear normal.      Nose: Nose normal.      Mouth/Throat:      Mouth: Mucous membranes are dry.      Pharynx: Oropharynx is clear.   Eyes:      Extraocular Movements: Extraocular movements intact.      Conjunctiva/sclera: Conjunctivae normal.      Pupils: Pupils are equal, round, and reactive to light.   Cardiovascular:      Rate and Rhythm: Normal rate and regular rhythm.      Pulses: Normal pulses.      Heart sounds: Normal heart sounds.   Pulmonary:      Effort: Pulmonary effort is normal.      Breath sounds: Normal breath sounds.   Abdominal:      General: Abdomen is flat. Bowel sounds are normal.      Palpations: Abdomen is soft.   Genitourinary:     Rectum: Normal.   Musculoskeletal:         General: Swelling and tenderness present.  Normal range of motion.      Cervical back: Normal range of motion and neck supple.      Comments: Left knee edema with effusion  Ttp; warm   Skin:     General: Skin is warm and dry.      Capillary Refill: Capillary refill takes less than 2 seconds.   Neurological:      General: No focal deficit present.      Mental Status: He is alert and oriented to person, place, and time.   Psychiatric:         Mood and Affect: Mood normal.         Behavior: Behavior normal.         Thought Content: Thought content normal.         Judgment: Judgment normal.         CRANIAL NERVES     CN III, IV, VI   Pupils are equal, round, and reactive to light.     Significant Labs: All pertinent labs within the past 24 hours have been reviewed.    Significant Imaging: I have reviewed all pertinent imaging results/findings within the past 24 hours.

## 2023-01-24 NOTE — PLAN OF CARE
[facility] - Emergency Dept  Initial Discharge Assessment       Primary Care Provider: Becky Brown NP    Admission Diagnosis: Acute pain of left knee [M25.562]    Admission Date: 1/23/2023  Expected Discharge Date: 1/26/2023    Discharge Barriers Identified: None    Payor: BLUE CROSS BLUE SHIELD / Plan: [facility] FEDERAL BASIC / Product Type: PPO /     Extended Emergency Contact Information  Primary Emergency Contact: BradleyLiane  Address: [address] [place] [address] [place] [address] [facility]  Home Phone: [phone]  Mobile Phone: [phone]  Relation: Other    Discharge Plan A: Home  Discharge Plan B: Home      [id number] - [place] - [address] AT [facility] [address] & [facility]  [address]   [place] [address]  Phone: [phone] Fax: [phone]      Initial Assessment (most recent)       Adult Discharge Assessment - 01/24/23 1443          Discharge Assessment    Assessment Type Discharge Planning Assessment     Confirmed/corrected address, phone number and insurance Yes     Confirmed Demographics Correct on Facesheet     Source of Information patient     Does patient/caregiver understand observation status Yes     Communicated RITA with patient/caregiver Date not available/Unable to determine     People in Home alone     Do you expect to return to your current living situation? Yes     Do you have help at home or someone to help you manage your care at home? No     Prior to hospitilization cognitive status: Alert/Oriented     Current cognitive status: Alert/Oriented     Home Accessibility wheelchair accessible     Home Layout Able to live on 1st floor     Equipment Currently Used at Home none     Readmission within 30 days? No     Patient currently being followed by outpatient case management? No     Do you currently have service(s) that help you manage your care at home? No     Do you take prescription medications? Yes     Do you have  prescription coverage? Yes     Coverage BCBS     Do you have any problems affording any of your prescribed medications? No     Is the patient taking medications as prescribed? yes     Who is going to help you get home at discharge? Drives self     How do you get to doctors appointments? car, drives self     Are you on dialysis? No     Do you take coumadin? No     Discharge Plan A Home     Discharge Plan B Home     DME Needed Upon Discharge  none     Discharge Plan discussed with: Patient     Discharge Barriers Identified None

## 2023-01-24 NOTE — ASSESSMENT & PLAN NOTE
Pain management tylenol prn or Tylenol #3 at bedtime  Continue colchicine  Cefotaxime Q 8 hrs; following dose of IV Vanc and rocephin

## 2023-01-24 NOTE — H&P
[facility] - Emergency Dept  Hospital Medicine  History & Physical    Patient Name: Shayne Huerta Jr.  MRN: [id number]  Patient Class: OP- Observation  Admission Date: 1/23/2023  Attending Physician:   Primary Care Provider: Becyk Brown NP         Patient information was obtained from patient and ER records.     Subjective:     Principal Problem:<principal problem not specified>    Chief Complaint:   Chief Complaint   Patient presents with    Knee Pain     LEFT X 4 DAYS    Headache        HPI: 01/23/2023 ER note...52-year-old male with history of a septic joint and recurrent idiopathic gout presenting for 4 days of worsening left knee redness, pain, and swelling. Takes allopurinol daily and has been taking colchicine for the past 4 days without improvement. Also reports subjective fever 3 days ago, has been taking tylenol for pain/subjective fever. Has been unable to sleep the last 3 days due to the pain. Reports episode of nonbloody vomiting and nausea this morning. Reports pain so severe he is unable to bear weight without using cane, and reports that it feels similar to the previous time he has septic arthritis.    01/23/2023 Patient seen in the ER and he has left knee pain; non--traumatic. He has history of Gout. He has a rheumatologist                     , but has been unable to get a follow-up appointment. He reports the colchicine has long stopped working well                       For him. Labs and imaging reviewed. Denies any new injury or trauma. Denies fever or chills. Ortho consulted                  Past Medical History:   Diagnosis Date    COVID-19     Gout, unspecified        Past Surgical History:   Procedure Laterality Date    FINGER SURGERY      finger laceration- near amputation    KNEE SURGERY Left     scope    ROTATOR CUFF REPAIR Right     TONSILLECTOMY         Review of patient's allergies indicates:  No Known Allergies    No current facility-administered  medications on file prior to encounter.     Current Outpatient Medications on File Prior to Encounter   Medication Sig    acetaminophen-codeine 300-30mg (TYLENOL #3) 300-30 mg Tab Take 1 tablet by mouth.    allopurinoL (ZYLOPRIM) 300 MG tablet Take 300 mg by mouth once daily. Take 1 tablet by mouth daily    colchicine (COLCRYS) 0.6 mg tablet Take 1 tablet (0.6 mg total) by mouth 2 (two) times daily.    ergocalciferol (ERGOCALCIFEROL) 50,000 unit Cap TAKE 1 CAPSULE BY MOUTH EVERY 7 DAYS (Patient not taking: Reported on 2022)    indomethacin (INDOCIN) 50 MG capsule Take 50 mg by mouth 3 (three) times daily. Take 1 capsules by mouth three times daily as needed for pain     Family History       Problem Relation (Age of Onset)    Cancer Father          Tobacco Use    Smoking status: Former     Packs/day: 1.00     Years: 15.00     Pack years: 15.00     Types: Cigarettes     Quit date: 2011     Years since quittin.9    Smokeless tobacco: Never   Substance and Sexual Activity    Alcohol use: No    Drug use: No    Sexual activity: Yes     Review of Systems   Constitutional:  Negative for chills, diaphoresis and fatigue.   HENT:  Negative for congestion, ear pain, sinus pain and sore throat.    Respiratory:  Negative for cough, choking, chest tightness and shortness of breath.    Cardiovascular:  Negative for chest pain, palpitations and leg swelling.   Gastrointestinal:  Negative for abdominal distention, abdominal pain, blood in stool, nausea and vomiting.   Endocrine: Negative for cold intolerance and heat intolerance.   Genitourinary:  Negative for dysuria, enuresis, flank pain and hematuria.   Musculoskeletal:  Positive for arthralgias, gait problem and joint swelling.   Skin:  Negative for color change, pallor and rash.   Neurological:  Negative for dizziness, facial asymmetry, light-headedness and headaches.   Hematological:  Negative for adenopathy. Does not bruise/bleed easily.    Psychiatric/Behavioral:  Negative for agitation, behavioral problems and confusion.    All other systems reviewed and are negative.  Objective:     Vital Signs (Most Recent):  Temp: 99 °F (37.2 °C) (01/23/23 1259)  Pulse: 103 (01/23/23 2129)  Resp: 20 (01/23/23 1959)  BP: (!) 162/94 (01/23/23 2129)  SpO2: (!) 92 % (01/23/23 2129)   Vital Signs (24h Range):  Temp:  [99 °F (37.2 °C)] 99 °F (37.2 °C)  Pulse:  [] 103  Resp:  [16-20] 20  SpO2:  [92 %-98 %] 92 %  BP: (162-189)/(89-99) 162/94     Weight: 113.4 kg (250 lb)  Body mass index is 35.87 kg/m².    Physical Exam  Vitals and nursing note reviewed.   Constitutional:       Appearance: Normal appearance.   HENT:      Head: Normocephalic and atraumatic.      Right Ear: Tympanic membrane, ear canal and external ear normal.      Left Ear: Tympanic membrane, ear canal and external ear normal.      Nose: Nose normal.      Mouth/Throat:      Mouth: Mucous membranes are dry.      Pharynx: Oropharynx is clear.   Eyes:      Extraocular Movements: Extraocular movements intact.      Conjunctiva/sclera: Conjunctivae normal.      Pupils: Pupils are equal, round, and reactive to light.   Cardiovascular:      Rate and Rhythm: Normal rate and regular rhythm.      Pulses: Normal pulses.      Heart sounds: Normal heart sounds.   Pulmonary:      Effort: Pulmonary effort is normal.      Breath sounds: Normal breath sounds.   Abdominal:      General: Abdomen is flat. Bowel sounds are normal.      Palpations: Abdomen is soft.   Genitourinary:     Rectum: Normal.   Musculoskeletal:         General: Swelling and tenderness present. Normal range of motion.      Cervical back: Normal range of motion and neck supple.      Comments: Left knee edema with effusion  Ttp; warm   Skin:     General: Skin is warm and dry.      Capillary Refill: Capillary refill takes less than 2 seconds.   Neurological:      General: No focal deficit present.      Mental Status: He is alert and oriented to  person, place, and time.   Psychiatric:         Mood and Affect: Mood normal.         Behavior: Behavior normal.         Thought Content: Thought content normal.         Judgment: Judgment normal.         CRANIAL NERVES     CN III, IV, VI   Pupils are equal, round, and reactive to light.     Significant Labs: All pertinent labs within the past 24 hours have been reviewed.    Significant Imaging: I have reviewed all pertinent imaging results/findings within the past 24 hours.    Assessment/Plan:     Gout of left knee  Left knee elevated prn  ICE TID to left knee  Patient may benefit from steroid dose lorraine  Awaiting follow-up with ortho      Left knee pain  Pain management tylenol prn or Tylenol #3 at bedtime  Continue colchicine  Cefotaxime Q 8 hrs; following dose of IV Vanc and rocephin          VTE Risk Mitigation (From admission, onward)         Ordered     IP VTE HIGH RISK PATIENT  Once         01/23/23 2339     Place sequential compression device  Until discontinued         01/23/23 7170                   KYLIE Marshall  Department of Hospital Medicine   [facility] - Emergency Dept

## 2023-01-24 NOTE — HPI
01/23/2023 ER note...52-year-old male with history of a septic joint and recurrent idiopathic gout presenting for 4 days of worsening left knee redness, pain, and swelling. Takes allopurinol daily and has been taking colchicine for the past 4 days without improvement. Also reports subjective fever 3 days ago, has been taking tylenol for pain/subjective fever. Has been unable to sleep the last 3 days due to the pain. Reports episode of nonbloody vomiting and nausea this morning. Reports pain so severe he is unable to bear weight without using cane, and reports that it feels similar to the previous time he has septic arthritis.    01/23/2023 Patient seen in the ER and he has left knee pain; non--traumatic. He has history of Gout. He has a rheumatologist                     , but has been unable to get a follow-up appointment. He reports the colchicine has long stopped working well                       For him. Labs and imaging reviewed. Denies any new injury or trauma. Denies fever or chills. Ortho consulted

## 2023-01-24 NOTE — NURSING
Patient discharged home in stable condition with all personal belongings accompanied by his wife. AVS provided and discussed. All questions addressed.

## 2023-01-26 NOTE — PLAN OF CARE
01/26/23 1452   Final Note   Assessment Type Final Discharge Note   Anticipated Discharge Disposition Home

## 2023-01-27 LAB
BACTERIA FLD AEROBE CULT: NO GROWTH
GRAM STN SPEC: NORMAL
GRAM STN SPEC: NORMAL

## 2023-01-28 LAB
BACTERIA BLD CULT: NORMAL
BACTERIA BLD CULT: NORMAL

## 2023-02-18 NOTE — DISCHARGE SUMMARY
"[facility]  Discharge Summary  Patient Name: Shayne Huerta Jr. MRN: [id number]   Patient Class: OP- Observation  Length of Stay: 0   Admission Date: 1/23/2023 12:51 PM Attending Physician: Brett Seymour MD   Primary Care Provider: Becky Brown NP Face-to-Face encounter date: 1/24/23   Chief Complaint: Knee Pain (LEFT X 4 DAYS) and Headache    Date of Discharge: 1/24/23  Discharge Disposition:Home or Self Care   Condition: Stable       Reason for Hospitalization     Active Hospital Problems    Diagnosis    *Gout of left knee    Left knee pain         Brief History of Present Illness      52-year-old male with history of a septic joint and recurrent idiopathic gout presenting for 4 days of worsening left knee redness, pain, and swelling. Takes allopurinol daily and has been taking colchicine for the past 4 days without improvement. Also reports subjective fever 3 days ago, has been taking tylenol for pain/subjective fever. Has been unable to sleep the last 3 days due to the pain. Reports episode of nonbloody vomiting and nausea this morning. Reports pain so severe he is unable to bear weight without using cane, and reports that it feels similar to the previous time he has septic arthritis.       Hospital Course By Problem with Pertinent Findings     Antibiotics can be discontinued   he can be discharged on typical gout treatment.   he has had significant improvement in his pain on colchicine and indomethacin         Patient was seen and examined on the date of discharge and determined to be suitable for discharge.    Physical Exam  BP (!) 145/69   Pulse 88   Temp 98.2 °F (36.8 °C) (Oral)   Resp 20   Ht 5' 10" (1.778 m)   Wt 113.4 kg (250 lb)   SpO2 (!) 92%   BMI 35.87 kg/m²   Vitals reviewed.    Constitutional: No distress.   HENT: Atraumatic.   Cardiovascular: Normal rate, regular rhythm.  S1 S2.    Pulmonary/Chest: Effort normal. Clear to auscultation bilaterally. No wheezes. "   Abdominal: Soft. Bowel sounds are normal. Exhibits no distension and no mass. No tenderness  Neurological: Alert.   Musc:              Left knee with mild joint swelling.  Range of motion is good enough to exclude septic arthritis  Skin: Skin is warm and dry.     Following labs were Reviewed   No results for input(s): WBC, HGB, HCT, PLT, GLUCOSE, CALCIUM, ALBUMIN, PROT, NA, K, CO2, CL, BUN, CREATININE, ALKPHOS, ALT, AST, BILITOT in the last 24 hours.  No results found for: POCTGLUCOSE     All labs within the past 24 hours have been reviewed    Microbiology Results (last 7 days)       Procedure Component Value Units Date/Time    Respiratory Infection Panel (PCR), Nasopharyngeal [[id number]] Collected: 01/23/23 1823    Order Status: Completed Specimen: Nasopharyngeal Swab Updated: 01/23/23 2132     Respiratory Infection Panel Source NP swab     Adenovirus Not Detected     Coronavirus 229E, Common Cold Virus Not Detected     Coronavirus HKU1, Common Cold Virus Not Detected     Coronavirus NL63, Common Cold Virus Not Detected     Coronavirus OC43, Common Cold Virus Not Detected     Comment: The Coronavirus strains detected in this test cause the common cold.  These strains are not the COVID-19 (novel Coronavirus)strain   associated with the respiratory disease outbreak.          SARS-CoV2 (COVID-19) Qualitative PCR Not Detected     Human Metapneumovirus Not Detected     Human Rhinovirus/Enterovirus Not Detected     Influenza A (subtypes H1, H1-2009,H3) Not Detected     Influenza B Not Detected     Parainfluenza Virus 1 Not Detected     Parainfluenza Virus 2 Not Detected     Parainfluenza Virus 3 Not Detected     Parainfluenza Virus 4 Not Detected     Respiratory Syncytial Virus Not Detected     Bordetella Parapertussis (MA7991) Not Detected     Bordetella pertussis (ptxP) Not Detected     Chlamydia pneumoniae Not Detected     Mycoplasma pneumoniae Not Detected     Comment: Respiratory Infection Panel testing performed  by Multiplex PCR.       Narrative:      Respiratory Infection Panel source->NP Swab    Group A Strep, Molecular [[id number]] Collected: 01/23/23 1914    Order Status: Completed Specimen: Throat Updated: 01/23/23 2006     Group A Strep, Molecular Negative     Comment: Arcanobacterium haemolyticum and Beta Streptococcus group C   and G will not be detected by this test method.  Please order   Throat Culture ([id number]) if suspected.               X-Ray Chest AP Portable   Final Result      X-Ray Knee Complete 4 or more Views Left   Final Result          No results found for this or any previous visit.      Consultants and Procedures   Consultants:  Consults (From admission, onward)          Status Ordering Provider     Inpatient consult to Infectious Diseases  Once        Provider:  Brittany Stephenson MD    Completed MEHDI BYRNE            Procedures:   * No surgery found *     Discharge Information:   Diet:  Resume Cardiac diet/Diabetic Diet    Physical Activity:  Activity as tolerated    Instructions:  1. Take all medications as prescribed  2. Keep all follow-up appointments  3. Return to the hospital or call your primary care physicians if any worsening symptoms such as chest pain, shortness of breath, bleeding,  intractable pain, fever >101 occur.      Follow-Up Appointments:  Please call your primary care physician to schedule an appointment in 1 week time.     Follow-up Information       Becky Brown NP .    Specialty: Family Medicine  Contact information:  [address]  [address]  [facility] [address] [phone]               [facility] - Emergency Dept .    Specialty: Emergency Medicine  Why: As needed, If symptoms worsen  Contact information:  [address] [facility] [facility] [address] [phone]  Additional information:  1st floor                             Pending laboratory work/Tests to be performed/followed by the Primary care Physician:    The patient was  discharged with discharge instructions reviewed in written and verbal form. All questions were answered and prescriptions were provided. The importance of making follow up appointments and compliance of medications has been emphasized. The patient will follow up in 1 week or sooner as needed with the PCP. Tthe patient understands and agrees with the plan. Upon discharge, patient needs to be on following medications.    Discharge Medications:     Medication List        CHANGE how you take these medications      indomethacin 50 MG capsule  Commonly known as: INDOCIN  Take 1 capsule (50 mg total) by mouth 3 (three) times daily.  What changed: additional instructions            CONTINUE taking these medications      allopurinoL 300 MG tablet  Commonly known as: ZYLOPRIM     colchicine 0.6 mg tablet  Commonly known as: COLCRYS  Take 1 tablet (0.6 mg total) by mouth 2 (two) times daily.            STOP taking these medications      acetaminophen-codeine 300-30mg 300-30 mg Tab  Commonly known as: TYLENOL #3     ergocalciferol 50,000 unit Cap  Commonly known as: ERGOCALCIFEROL               Where to Get Your Medications        These medications were sent to [id number] - [address] & [address] [address]      Hours: 24-hours Phone: [phone]   indomethacin 50 MG capsule           I spent 22 minutes preparing the discharge for this patient including reviewing records from previous encounters, preparation of discharge summary, assessing and final examination of the patient, discharge medicine reconciliation, discussing plan of care, follow up and education and prescriptions.       Brett Seymour  [facility]

## 2023-03-01 ENCOUNTER — OFFICE VISIT (OUTPATIENT)
Dept: FAMILY MEDICINE | Facility: CLINIC | Age: 53
End: 2023-03-01
Payer: COMMERCIAL

## 2023-03-01 VITALS
SYSTOLIC BLOOD PRESSURE: 150 MMHG | HEIGHT: 70 IN | HEART RATE: 75 BPM | DIASTOLIC BLOOD PRESSURE: 86 MMHG | BODY MASS INDEX: 35.84 KG/M2 | WEIGHT: 250.38 LBS | OXYGEN SATURATION: 95 %

## 2023-03-01 DIAGNOSIS — R21 RASH: ICD-10-CM

## 2023-03-01 DIAGNOSIS — M1A.09X0 IDIOPATHIC CHRONIC GOUT OF MULTIPLE SITES WITHOUT TOPHUS: Primary | ICD-10-CM

## 2023-03-01 DIAGNOSIS — H91.93 BILATERAL HEARING LOSS, UNSPECIFIED HEARING LOSS TYPE: ICD-10-CM

## 2023-03-01 DIAGNOSIS — I10 PRIMARY HYPERTENSION: ICD-10-CM

## 2023-03-01 PROCEDURE — 4010F ACE/ARB THERAPY RXD/TAKEN: CPT | Mod: CPTII,S$GLB,, | Performed by: NURSE PRACTITIONER

## 2023-03-01 PROCEDURE — 99396 PR PREVENTIVE VISIT,EST,40-64: ICD-10-PCS | Mod: S$GLB,,, | Performed by: NURSE PRACTITIONER

## 2023-03-01 PROCEDURE — 3008F PR BODY MASS INDEX (BMI) DOCUMENTED: ICD-10-PCS | Mod: CPTII,S$GLB,, | Performed by: NURSE PRACTITIONER

## 2023-03-01 PROCEDURE — 99396 PREV VISIT EST AGE 40-64: CPT | Mod: S$GLB,,, | Performed by: NURSE PRACTITIONER

## 2023-03-01 PROCEDURE — 3077F SYST BP >= 140 MM HG: CPT | Mod: CPTII,S$GLB,, | Performed by: NURSE PRACTITIONER

## 2023-03-01 PROCEDURE — 1159F MED LIST DOCD IN RCRD: CPT | Mod: CPTII,S$GLB,, | Performed by: NURSE PRACTITIONER

## 2023-03-01 PROCEDURE — 1160F PR REVIEW ALL MEDS BY PRESCRIBER/CLIN PHARMACIST DOCUMENTED: ICD-10-PCS | Mod: CPTII,S$GLB,, | Performed by: NURSE PRACTITIONER

## 2023-03-01 PROCEDURE — 1160F RVW MEDS BY RX/DR IN RCRD: CPT | Mod: CPTII,S$GLB,, | Performed by: NURSE PRACTITIONER

## 2023-03-01 PROCEDURE — 3008F BODY MASS INDEX DOCD: CPT | Mod: CPTII,S$GLB,, | Performed by: NURSE PRACTITIONER

## 2023-03-01 PROCEDURE — 3079F DIAST BP 80-89 MM HG: CPT | Mod: CPTII,S$GLB,, | Performed by: NURSE PRACTITIONER

## 2023-03-01 PROCEDURE — 3077F PR MOST RECENT SYSTOLIC BLOOD PRESSURE >= 140 MM HG: ICD-10-PCS | Mod: CPTII,S$GLB,, | Performed by: NURSE PRACTITIONER

## 2023-03-01 PROCEDURE — 1159F PR MEDICATION LIST DOCUMENTED IN MEDICAL RECORD: ICD-10-PCS | Mod: CPTII,S$GLB,, | Performed by: NURSE PRACTITIONER

## 2023-03-01 PROCEDURE — 3079F PR MOST RECENT DIASTOLIC BLOOD PRESSURE 80-89 MM HG: ICD-10-PCS | Mod: CPTII,S$GLB,, | Performed by: NURSE PRACTITIONER

## 2023-03-01 PROCEDURE — 4010F PR ACE/ARB THEARPY RXD/TAKEN: ICD-10-PCS | Mod: CPTII,S$GLB,, | Performed by: NURSE PRACTITIONER

## 2023-03-01 RX ORDER — LOSARTAN POTASSIUM 50 MG/1
50 TABLET ORAL DAILY
Qty: 90 TABLET | Refills: 1 | Status: SHIPPED | OUTPATIENT
Start: 2023-03-01 | End: 2023-09-06 | Stop reason: SDUPTHER

## 2023-03-01 NOTE — PROGRESS NOTES
SUBJECTIVE:    Patient ID: Shayne Huerta Jr. is a 52 y.o. male.    Chief Complaint: Follow-up (No bottles// pt here for follow up// denied flu vacc// [facility])    Pt here for annual physical/gout f/u    Pt reports went to ER last month with left knee pain and swelling- had knee tapped for concern for septic joint and admitted for IV abx. Then saw Dr. Stephenson who did not feel this was infectious and was likely just gout- treated with colchiine and indomethacin and pain improved/resolved. Pt admits he had messed up and had stopped the allopurinol a month or two prior to this episode. Reports he's been trying to watch his diet recently as his dietary noncompliance has triggered previous attacks    Reports knee now feeling good, no pain/swelling. Taking allopurinol once a day and indomethacin once a day. Has f/u with rheum at the end of the month    Pt asking for derm referral- reports has intermittent pruritic papules to lower legs bilat- seems to come/go, currently doesn't have rash but has some flesh colored papules    Asking for referral for hearing test- reports chronic tinnitus and hearing seems to be worsening      Admission on 01/23/2023, Discharged on 01/24/2023   Component Date Value Ref Range Status    SARS-CoV-2 RNA, Amplification, Qual 01/23/2023 Negative  Negative Final    Influenza A, Molecular 01/23/2023 Negative  Negative Final    Influenza B, Molecular 01/23/2023 Negative  Negative Final    Flu A & B Source 01/23/2023 Nasal swab   Final    WBC 01/23/2023 12.07  3.90 - 12.70 K/uL Final    RBC 01/23/2023 5.92  4.60 - 6.20 M/uL Final    Hemoglobin 01/23/2023 16.7  14.0 - 18.0 g/dL Final    Hematocrit 01/23/2023 50.8  40.0 - 54.0 % Final    MCV 01/23/2023 86  82 - 98 fL Final    MCH 01/23/2023 28.2  27.0 - 31.0 pg Final    MCHC 01/23/2023 32.9  32.0 - 36.0 g/dL Final    RDW 01/23/2023 13.6  11.5 - 14.5 % Final    Platelets 01/23/2023 278  150 - 450 K/uL Final    MPV 01/23/2023 10.6  9.2 - 12.9 fL Final     Immature Granulocytes 01/23/2023 0.2  0.0 - 0.5 % Final    Gran # (ANC) 01/23/2023 8.0 (H)  1.8 - 7.7 K/uL Final    Immature Grans (Abs) 01/23/2023 0.03  0.00 - 0.04 K/uL Final    Lymph # 01/23/2023 2.5  1.0 - 4.8 K/uL Final    Mono # 01/23/2023 1.1 (H)  0.3 - 1.0 K/uL Final    Eos # 01/23/2023 0.4  0.0 - 0.5 K/uL Final    Baso # 01/23/2023 0.04  0.00 - 0.20 K/uL Final    nRBC 01/23/2023 0  0 /100 WBC Final    Gran % 01/23/2023 66.0  38.0 - 73.0 % Final    Lymph % 01/23/2023 21.0  18.0 - 48.0 % Final    Mono % 01/23/2023 8.9  4.0 - 15.0 % Final    Eosinophil % 01/23/2023 3.6  0.0 - 8.0 % Final    Basophil % 01/23/2023 0.3  0.0 - 1.9 % Final    Differential Method 01/23/2023 Automated   Final    Sodium 01/23/2023 137  136 - 145 mmol/L Final    Potassium 01/23/2023 3.9  3.5 - 5.1 mmol/L Final    Chloride 01/23/2023 100  95 - 110 mmol/L Final    CO2 01/23/2023 27  23 - 29 mmol/L Final    Glucose 01/23/2023 96  70 - 110 mg/dL Final    BUN 01/23/2023 11  6 - 20 mg/dL Final    Creatinine 01/23/2023 1.0  0.5 - 1.4 mg/dL Final    Calcium 01/23/2023 9.4  8.7 - 10.5 mg/dL Final    Total Protein 01/23/2023 8.0  6.0 - 8.4 g/dL Final    Albumin 01/23/2023 4.7  3.5 - 5.2 g/dL Final    Total Bilirubin 01/23/2023 0.9  0.1 - 1.0 mg/dL Final    Alkaline Phosphatase 01/23/2023 78  55 - 135 U/L Final    AST 01/23/2023 23  10 - 40 U/L Final    ALT 01/23/2023 52 (H)  10 - 44 U/L Final    Anion Gap 01/23/2023 10  8 - 16 mmol/L Final    eGFR 01/23/2023 >60.0  >60 mL/min/1.73 m^2 Final    Sed Rate 01/23/2023 4  0 - 10 mm/Hr Final    CRP 01/23/2023 5.25 (H)  <0.76 mg/dL Final    Blood Culture, Routine 01/23/2023 No growth after 5 days.   Final    Blood Culture, Routine 01/23/2023 No growth after 5 days.   Final    Lactate (Lactic Acid) 01/23/2023 1.2  0.5 - 1.9 mmol/L Final    Group A Strep, Molecular 01/23/2023 Negative  Negative Final    Respiratory Infection Panel Source 01/23/2023 NP swab   Final    Adenovirus 01/23/2023 Not Detected   Not Detected Final    Coronavirus 229E, Common Cold Virus 01/23/2023 Not Detected  Not Detected Final    Coronavirus HKU1, Common Cold Virus 01/23/2023 Not Detected  Not Detected Final    Coronavirus NL63, Common Cold Virus 01/23/2023 Not Detected  Not Detected Final    Coronavirus OC43, Common Cold Virus 01/23/2023 Not Detected  Not Detected Final    SARS-CoV2 (COVID-19) Qualitative P* 01/23/2023 Not Detected  Not Detected Final    Human Metapneumovirus 01/23/2023 Not Detected  Not Detected Final    Human Rhinovirus/Enterovirus 01/23/2023 Not Detected  Not Detected Final    Influenza A (subtypes H1, H1-2009,* 01/23/2023 Not Detected  Not Detected Final    Influenza B 01/23/2023 Not Detected  Not Detected Final    Parainfluenza Virus 1 01/23/2023 Not Detected  Not Detected Final    Parainfluenza Virus 2 01/23/2023 Not Detected  Not Detected Final    Parainfluenza Virus 3 01/23/2023 Not Detected  Not Detected Final    Parainfluenza Virus 4 01/23/2023 Not Detected  Not Detected Final    Respiratory Syncytial Virus 01/23/2023 Not Detected  Not Detected Final    Bordetella Parapertussis ([id number]) 01/23/2023 Not Detected  Not Detected Final    Bordetella pertussis (ptxP) 01/23/2023 Not Detected  Not Detected Final    Chlamydia pneumoniae 01/23/2023 Not Detected  Not Detected Final    Mycoplasma pneumoniae 01/23/2023 Not Detected  Not Detected Final    Uric Acid 01/23/2023 7.5 (H)  3.4 - 7.0 mg/dL Final    AEROBIC CULTURE - FLUID 01/23/2023 No growth   Final    Gram Stain Result 01/23/2023 Few WBC's   Final    Gram Stain Result 01/23/2023 No organisms seen   Final    Body Fluid Type 01/23/2023 Synovial   Final    Fluid Appearance 01/23/2023 Bloody   Final    Fluid Color 01/23/2023 Red   Final    WBC, Body Fluid 01/23/2023 2576  /cu mm Final    Segs, Fluid 01/23/2023 89  % Final    Lymphs, Fluid 01/23/2023 8  % Final    Monocytes/Macrophages, Fluid 01/23/2023 3  % Final    Body Fluid Source, Crystal Exam 01/23/2023 Synovial    Final    Body Fluid Crystal 01/23/2023 Negative  Negative Final    Specimen UA 01/23/2023 Urine, Clean Catch   Final    Color, UA 01/23/2023 Yellow  Yellow, Straw, Kristin Final    Appearance, UA 01/23/2023 Clear  Clear Final    pH, UA 01/23/2023 6.0  5.0 - 8.0 Final    Specific Gravity, UA 01/23/2023 1.020  1.005 - 1.030 Final    Protein, UA 01/23/2023 Negative  Negative Final    Glucose, UA 01/23/2023 Negative  Negative Final    Ketones, UA 01/23/2023 1+ (A)  Negative Final    Bilirubin (UA) 01/23/2023 Negative  Negative Final    Occult Blood UA 01/23/2023 Trace (A)  Negative Final    Nitrite, UA 01/23/2023 Negative  Negative Final    Urobilinogen, UA 01/23/2023 Negative  Negative EU/dL Final    Leukocytes, UA 01/23/2023 Negative  Negative Final    WBC 01/24/2023 11.16  3.90 - 12.70 K/uL Final    RBC 01/24/2023 5.51  4.60 - 6.20 M/uL Final    Hemoglobin 01/24/2023 15.6  14.0 - 18.0 g/dL Final    Hematocrit 01/24/2023 46.6  40.0 - 54.0 % Final    MCV 01/24/2023 85  82 - 98 fL Final    MCH 01/24/2023 28.3  27.0 - 31.0 pg Final    MCHC 01/24/2023 33.5  32.0 - 36.0 g/dL Final    RDW 01/24/2023 13.5  11.5 - 14.5 % Final    Platelets 01/24/2023 279  150 - 450 K/uL Final    MPV 01/24/2023 10.7  9.2 - 12.9 fL Final    Immature Granulocytes 01/24/2023 0.3  0.0 - 0.5 % Final    Gran # (ANC) 01/24/2023 6.9  1.8 - 7.7 K/uL Final    Immature Grans (Abs) 01/24/2023 0.03  0.00 - 0.04 K/uL Final    Lymph # 01/24/2023 2.2  1.0 - 4.8 K/uL Final    Mono # 01/24/2023 1.3 (H)  0.3 - 1.0 K/uL Final    Eos # 01/24/2023 0.7 (H)  0.0 - 0.5 K/uL Final    Baso # 01/24/2023 0.03  0.00 - 0.20 K/uL Final    nRBC 01/24/2023 0  0 /100 WBC Final    Gran % 01/24/2023 61.5  38.0 - 73.0 % Final    Lymph % 01/24/2023 20.0  18.0 - 48.0 % Final    Mono % 01/24/2023 11.6  4.0 - 15.0 % Final    Eosinophil % 01/24/2023 6.3  0.0 - 8.0 % Final    Basophil % 01/24/2023 0.3  0.0 - 1.9 % Final    Differential Method 01/24/2023 Automated   Final    Sodium  01/24/2023 138  136 - 145 mmol/L Final    Potassium 01/24/2023 3.9  3.5 - 5.1 mmol/L Final    Chloride 01/24/2023 101  95 - 110 mmol/L Final    CO2 01/24/2023 26  23 - 29 mmol/L Final    Glucose 01/24/2023 115 (H)  70 - 110 mg/dL Final    BUN 01/24/2023 12  6 - 20 mg/dL Final    Creatinine 01/24/2023 0.9  0.5 - 1.4 mg/dL Final    Calcium 01/24/2023 9.2  8.7 - 10.5 mg/dL Final    Anion Gap 01/24/2023 11  8 - 16 mmol/L Final    eGFR 01/24/2023 >60.0  >60 mL/min/1.73 m^2 Final    Vancomycin-Trough 01/24/2023 5.7 (L)  ug/mL Final   Office Visit on 12/01/2022   Component Date Value Ref Range Status    WBC 12/13/2022 7.3  3.8 - 10.8 Thousand/uL Final    RBC 12/13/2022 5.44  4.20 - 5.80 Million/uL Final    Hemoglobin 12/13/2022 15.3  13.2 - 17.1 g/dL Final    Hematocrit 12/13/2022 45.7  38.5 - 50.0 % Final    MCV 12/13/2022 84.0  80.0 - 100.0 fL Final    MCH 12/13/2022 28.1  27.0 - 33.0 pg Final    MCHC 12/13/2022 33.5  32.0 - 36.0 g/dL Final    RDW 12/13/2022 13.5  11.0 - 15.0 % Final    Platelets 12/13/2022 253  140 - 400 Thousand/uL Final    MPV 12/13/2022 10.6  7.5 - 12.5 fL Final    Neutrophils, Abs 12/13/2022 4,380  1,500 - 7,800 cells/uL Final    Lymph # 12/13/2022 1,964  850 - 3,900 cells/uL Final    Mono # 12/13/2022 599  200 - 950 cells/uL Final    Eos # 12/13/2022 329  15 - 500 cells/uL Final    Baso # 12/13/2022 29  0 - 200 cells/uL Final    Neutrophils Relative 12/13/2022 60  % Final    Lymph % 12/13/2022 26.9  % Final    Mono % 12/13/2022 8.2  % Final    Eosinophil % 12/13/2022 4.5  % Final    Basophil % 12/13/2022 0.4  % Final    Glucose 12/13/2022 96  65 - 99 mg/dL Final    BUN 12/13/2022 16  7 - 25 mg/dL Final    Creatinine 12/13/2022 0.80  0.70 - 1.30 mg/dL Final    eGFR 12/13/2022 107  > OR = 60 mL/min/1.73m2 Final    BUN/Creatinine Ratio 12/13/2022 NOT APPLICABLE  6 - 22 (calc) Final    Sodium 12/13/2022 139  135 - 146 mmol/L Final    Potassium 12/13/2022 4.2  3.5 - 5.3 mmol/L Final    Chloride  12/13/2022 106  98 - 110 mmol/L Final    CO2 12/13/2022 23  20 - 32 mmol/L Final    Calcium 12/13/2022 9.2  8.6 - 10.3 mg/dL Final    Total Protein 12/13/2022 6.5  6.1 - 8.1 g/dL Final    Albumin 12/13/2022 4.4  3.6 - 5.1 g/dL Final    Globulin, Total 12/13/2022 2.1  1.9 - 3.7 g/dL (calc) Final    Albumin/Globulin Ratio 12/13/2022 2.1  1.0 - 2.5 (calc) Final    Total Bilirubin 12/13/2022 0.4  0.2 - 1.2 mg/dL Final    Alkaline Phosphatase 12/13/2022 74  35 - 144 U/L Final    AST 12/13/2022 21  10 - 35 U/L Final    ALT 12/13/2022 49 (H)  9 - 46 U/L Final    Cholesterol 12/13/2022 197  <200 mg/dL Final    HDL 12/13/2022 36 (L)  > OR = 40 mg/dL Final    Triglycerides 12/13/2022 152 (H)  <150 mg/dL Final    LDL Cholesterol 12/13/2022 133 (H)  mg/dL (calc) Final    HDL/Cholesterol Ratio 12/13/2022 5.5 (H)  <5.0 (calc) Final    Non HDL Chol. (LDL+VLDL) 12/13/2022 161 (H)  <130 mg/dL (calc) Final    Color, UA 12/13/2022 YELLOW  YELLOW Final    Appearance, UA 12/13/2022 CLEAR  CLEAR Final    Specific Gravity, UA 12/13/2022 1.019  1.001 - 1.035 Final    pH, UA 12/13/2022 6.5  5.0 - 8.0 Final    Glucose, UA 12/13/2022 NEGATIVE  NEGATIVE Final    Bilirubin, UA 12/13/2022 NEGATIVE  NEGATIVE Final    Ketones, UA 12/13/2022 NEGATIVE  NEGATIVE Final    Occult Blood UA 12/13/2022 NEGATIVE  NEGATIVE Final    Protein, UA 12/13/2022 NEGATIVE  NEGATIVE Final    Nitrite, UA 12/13/2022 NEGATIVE  NEGATIVE Final    Leukocytes, UA 12/13/2022 NEGATIVE  NEGATIVE Final    WBC Casts, UA 12/13/2022 NONE SEEN  < OR = 5 /HPF Final    RBC Casts, UA 12/13/2022 NONE SEEN  < OR = 2 /HPF Final    Squam Epithel, UA 12/13/2022 NONE SEEN  < OR = 5 /HPF Final    Bacteria, UA 12/13/2022 NONE SEEN  NONE SEEN /HPF Final    Hyaline Casts, UA 12/13/2022 NONE SEEN  NONE SEEN /LPF Final    Service Cmt: 12/13/2022    Final    Reflexive Urine Culture 12/13/2022    Final    Uric Acid 12/13/2022 4.4  4.0 - 8.0 mg/dL Final    TSH w/reflex to FT4 12/13/2022 1.62  0.40 -  4.50 mIU/L Final    CRP 12/13/2022 12.7 (H)  <8.0 mg/L Final    Sed Rate 12/13/2022 6  < OR = 20 mm/h Final       Past Medical History:   Diagnosis Date    COVID-19     Gout, unspecified      Past Surgical History:   Procedure Laterality Date    COLONOSCOPY  01/2023    FINGER SURGERY      finger laceration- near amputation    KNEE SURGERY Left     scope    ROTATOR CUFF REPAIR Right     TONSILLECTOMY       Family History   Problem Relation Age of Onset    Cancer Father         Lung Ca       The 10-year CVD risk score (Hillary, et al., 2008) is: 20.4%    Values used to calculate the score:      Age: 52 years      Sex: Male      Diabetic: No      Tobacco smoker: No      Systolic Blood Pressure: 150 mmHg      Is BP treated: Yes      HDL Cholesterol: 36 mg/dL      Total Cholesterol: 197 mg/dL     Marital Status:   Alcohol History:  reports no history of alcohol use.  Tobacco History:  reports that he quit smoking about 12 years ago. His smoking use included cigarettes. He has a 15.00 pack-year smoking history. He has never used smokeless tobacco.  Drug History:  reports no history of drug use.    Health Maintenance Topics with due status: Not Due       Topic Last Completion Date    Hemoglobin A1c (Diabetic Prevention Screening) 10/08/2020    Lipid Panel 12/13/2022    Colorectal Cancer Screening 01/06/2023       There is no immunization history on file for this patient.    Review of patient's allergies indicates:  No Known Allergies    Current Outpatient Medications:     allopurinoL (ZYLOPRIM) 300 MG tablet, Take 300 mg by mouth once daily. Take 1 tablet by mouth daily, Disp: , Rfl:     colchicine (COLCRYS) 0.6 mg tablet, Take 1 tablet (0.6 mg total) by mouth 2 (two) times daily. (Patient not taking: Reported on 3/1/2023), Disp: 60 tablet, Rfl: 11    indomethacin (INDOCIN) 50 MG capsule, Take 1 capsule (50 mg total) by mouth 3 (three) times daily. (Patient not taking: Reported on 3/1/2023), Disp: 90 capsule,  "Rfl: 1    losartan (COZAAR) 50 MG tablet, Take 1 tablet (50 mg total) by mouth once daily., Disp: 90 tablet, Rfl: 1    Review of Systems   Constitutional:  Negative for appetite change, chills, fever and unexpected weight change.   HENT:  Positive for hearing loss and tinnitus. Negative for sore throat and trouble swallowing.    Respiratory:  Negative for cough, shortness of breath and wheezing.    Cardiovascular:  Negative for chest pain, palpitations and leg swelling.   Gastrointestinal:  Negative for abdominal pain, constipation and diarrhea.   Genitourinary:  Negative for dysuria, frequency and hematuria.   Musculoskeletal:  Negative for arthralgias (left knee pain resolved since d/c), back pain, gait problem and joint swelling.   Skin:  Positive for rash (reports intermittent pruritic papules to lower legs, seems to come and go).   Neurological:  Negative for dizziness, syncope, numbness and headaches.   Psychiatric/Behavioral:  Negative for dysphoric mood. The patient is not nervous/anxious.         Objective:      Vitals:    03/01/23 1017 03/01/23 1027   BP: (!) 158/104 (!) 150/86   Pulse: 75    SpO2: 95%    Weight: 113.6 kg (250 lb 6.4 oz)    Height: 5' 10" (1.778 m)      Physical Exam  Vitals reviewed.   Constitutional:       General: He is not in acute distress.     Appearance: He is well-developed.   HENT:      Head: Normocephalic and atraumatic.      Right Ear: Tympanic membrane and ear canal normal.      Left Ear: Tympanic membrane and ear canal normal.   Neck:      Vascular: No carotid bruit.   Cardiovascular:      Rate and Rhythm: Normal rate and regular rhythm.      Heart sounds: No murmur heard.  Pulmonary:      Effort: Pulmonary effort is normal. No respiratory distress.      Breath sounds: Normal breath sounds. No wheezing or rales.   Abdominal:      General: There is no distension.      Palpations: Abdomen is soft.      Tenderness: There is no abdominal tenderness.   Musculoskeletal:      Right " wrist: No swelling or deformity.      Cervical back: Neck supple.      Right knee: No effusion, bony tenderness or crepitus. Normal range of motion.      Left knee: No swelling, effusion, erythema or crepitus. Normal range of motion.      Right lower leg: No edema.      Left lower leg: No edema.   Lymphadenopathy:      Cervical: No cervical adenopathy.   Skin:     General: Skin is warm and dry.      Findings: No rash.          Neurological:      General: No focal deficit present.      Mental Status: He is alert and oriented to person, place, and time.      Gait: Gait normal.   Psychiatric:         Mood and Affect: Mood normal.         Assessment:       1. Idiopathic chronic gout of multiple sites without tophus    2. Primary hypertension    3. Rash    4. Bilateral hearing loss, unspecified hearing loss type           Plan:       1. Idiopathic chronic gout of multiple sites without tophus   -overall improved since last month's episode. Discussed imp of daily allopurinol and colchicine prn for flare. Also reviewed dietary precautions. F/u with rheum as scheduled later this month    2. Primary hypertension  -BP elevated today and during hospital stay, recommend adding losartan, recheck 2 weeks. Discussed imp of healthy diet and regular exercise to help with BP control  -     losartan (COZAAR) 50 MG tablet; Take 1 tablet (50 mg total) by mouth once daily.  Dispense: 90 tablet; Refill: 1    3. Rash  -     Ambulatory referral/consult to Dermatology; Future; Expected date: 03/08/2023    4. Bilateral hearing loss, unspecified hearing loss type  -     Ambulatory referral/consult to Audiology; Future; Expected date: 03/08/2023      Follow up in about 6 months (around 9/1/2023) for nurse visit in 2 weeks for BP check.          Counseled on age and gender appropriate medical preventative services, including cancer screenings, immunizations, overall nutritional health, need for a consistent exercise regimen and an overall push  towards maintaining a vigorous and active lifestyle.      3/1/2023 Becky Brown, NP

## 2023-03-01 NOTE — PATIENT INSTRUCTIONS
[facility]- call to schedule appointment  [address] [facility] [address]  Phone: [phone]    Dick Hermosillo MD- dermatology- call to schedule appointment  [address]  [facility] [address]  Phone: [phone]

## 2023-03-02 ENCOUNTER — PATIENT MESSAGE (OUTPATIENT)
Dept: RESEARCH | Facility: HOSPITAL | Age: 53
End: 2023-03-02
Payer: COMMERCIAL

## 2023-03-15 ENCOUNTER — TELEPHONE (OUTPATIENT)
Dept: FAMILY MEDICINE | Facility: CLINIC | Age: 53
End: 2023-03-15

## 2023-03-15 NOTE — TELEPHONE ENCOUNTER
----- Message from Tiburcio Dale sent at 3/15/2023  5:18 PM CDT -----  Pt had a nv bp check and didn't come in.  [phone]

## 2023-03-16 ENCOUNTER — CLINICAL SUPPORT (OUTPATIENT)
Dept: FAMILY MEDICINE | Facility: CLINIC | Age: 53
End: 2023-03-16
Payer: COMMERCIAL

## 2023-03-16 VITALS — OXYGEN SATURATION: 96 % | HEART RATE: 80 BPM | DIASTOLIC BLOOD PRESSURE: 76 MMHG | SYSTOLIC BLOOD PRESSURE: 134 MMHG

## 2023-03-16 DIAGNOSIS — I10 PRIMARY HYPERTENSION: Primary | ICD-10-CM

## 2023-03-16 NOTE — PROGRESS NOTES
Pt is here for a nurse visit B/P check. Pt is taken Losartan 50 mg daily. Per Becky, blood pressure looks good. Continue with current medication and return to clinic as scheduled. Pt acknowledge understanding

## 2023-05-01 ENCOUNTER — OFFICE VISIT (OUTPATIENT)
Dept: RHEUMATOLOGY | Facility: CLINIC | Age: 53
End: 2023-05-01
Payer: COMMERCIAL

## 2023-05-01 ENCOUNTER — LAB VISIT (OUTPATIENT)
Dept: LAB | Facility: HOSPITAL | Age: 53
End: 2023-05-01
Attending: INTERNAL MEDICINE
Payer: COMMERCIAL

## 2023-05-01 VITALS
SYSTOLIC BLOOD PRESSURE: 155 MMHG | DIASTOLIC BLOOD PRESSURE: 83 MMHG | HEART RATE: 75 BPM | WEIGHT: 259.69 LBS | BODY MASS INDEX: 37.18 KG/M2 | HEIGHT: 70 IN

## 2023-05-01 DIAGNOSIS — M1A.09X0 IDIOPATHIC CHRONIC GOUT OF MULTIPLE SITES WITHOUT TOPHUS: Primary | ICD-10-CM

## 2023-05-01 DIAGNOSIS — M1A.09X0 IDIOPATHIC CHRONIC GOUT OF MULTIPLE SITES WITHOUT TOPHUS: ICD-10-CM

## 2023-05-01 DIAGNOSIS — R41.3 MEMORY PROBLEM: ICD-10-CM

## 2023-05-01 LAB
CCP AB SER IA-ACNC: <0.5 U/ML
CRP SERPL-MCNC: 2.5 MG/L (ref 0–8.2)
ERYTHROCYTE [SEDIMENTATION RATE] IN BLOOD BY PHOTOMETRIC METHOD: 11 MM/HR (ref 0–23)
RHEUMATOID FACT SERPL-ACNC: <13 IU/ML (ref 0–15)
URATE SERPL-MCNC: 8.6 MG/DL (ref 3.4–7)

## 2023-05-01 PROCEDURE — 1159F MED LIST DOCD IN RCRD: CPT | Mod: CPTII,S$GLB,, | Performed by: INTERNAL MEDICINE

## 2023-05-01 PROCEDURE — 3077F SYST BP >= 140 MM HG: CPT | Mod: CPTII,S$GLB,, | Performed by: INTERNAL MEDICINE

## 2023-05-01 PROCEDURE — 3077F PR MOST RECENT SYSTOLIC BLOOD PRESSURE >= 140 MM HG: ICD-10-PCS | Mod: CPTII,S$GLB,, | Performed by: INTERNAL MEDICINE

## 2023-05-01 PROCEDURE — 3008F PR BODY MASS INDEX (BMI) DOCUMENTED: ICD-10-PCS | Mod: CPTII,S$GLB,, | Performed by: INTERNAL MEDICINE

## 2023-05-01 PROCEDURE — 99999 PR PBB SHADOW E&M-EST. PATIENT-LVL III: CPT | Mod: PBBFAC,,, | Performed by: INTERNAL MEDICINE

## 2023-05-01 PROCEDURE — 1160F PR REVIEW ALL MEDS BY PRESCRIBER/CLIN PHARMACIST DOCUMENTED: ICD-10-PCS | Mod: CPTII,S$GLB,, | Performed by: INTERNAL MEDICINE

## 2023-05-01 PROCEDURE — 84550 ASSAY OF BLOOD/URIC ACID: CPT | Performed by: INTERNAL MEDICINE

## 2023-05-01 PROCEDURE — 4010F ACE/ARB THERAPY RXD/TAKEN: CPT | Mod: CPTII,S$GLB,, | Performed by: INTERNAL MEDICINE

## 2023-05-01 PROCEDURE — 3079F PR MOST RECENT DIASTOLIC BLOOD PRESSURE 80-89 MM HG: ICD-10-PCS | Mod: CPTII,S$GLB,, | Performed by: INTERNAL MEDICINE

## 2023-05-01 PROCEDURE — 99214 PR OFFICE/OUTPT VISIT, EST, LEVL IV, 30-39 MIN: ICD-10-PCS | Mod: S$GLB,,, | Performed by: INTERNAL MEDICINE

## 2023-05-01 PROCEDURE — 86140 C-REACTIVE PROTEIN: CPT | Performed by: INTERNAL MEDICINE

## 2023-05-01 PROCEDURE — 86038 ANTINUCLEAR ANTIBODIES: CPT | Performed by: INTERNAL MEDICINE

## 2023-05-01 PROCEDURE — 99214 OFFICE O/P EST MOD 30 MIN: CPT | Mod: S$GLB,,, | Performed by: INTERNAL MEDICINE

## 2023-05-01 PROCEDURE — 99999 PR PBB SHADOW E&M-EST. PATIENT-LVL III: ICD-10-PCS | Mod: PBBFAC,,, | Performed by: INTERNAL MEDICINE

## 2023-05-01 PROCEDURE — 1160F RVW MEDS BY RX/DR IN RCRD: CPT | Mod: CPTII,S$GLB,, | Performed by: INTERNAL MEDICINE

## 2023-05-01 PROCEDURE — 3079F DIAST BP 80-89 MM HG: CPT | Mod: CPTII,S$GLB,, | Performed by: INTERNAL MEDICINE

## 2023-05-01 PROCEDURE — 86200 CCP ANTIBODY: CPT | Performed by: INTERNAL MEDICINE

## 2023-05-01 PROCEDURE — 4010F PR ACE/ARB THEARPY RXD/TAKEN: ICD-10-PCS | Mod: CPTII,S$GLB,, | Performed by: INTERNAL MEDICINE

## 2023-05-01 PROCEDURE — 86431 RHEUMATOID FACTOR QUANT: CPT | Performed by: INTERNAL MEDICINE

## 2023-05-01 PROCEDURE — 3008F BODY MASS INDEX DOCD: CPT | Mod: CPTII,S$GLB,, | Performed by: INTERNAL MEDICINE

## 2023-05-01 PROCEDURE — 85652 RBC SED RATE AUTOMATED: CPT | Performed by: INTERNAL MEDICINE

## 2023-05-01 PROCEDURE — 1159F PR MEDICATION LIST DOCUMENTED IN MEDICAL RECORD: ICD-10-PCS | Mod: CPTII,S$GLB,, | Performed by: INTERNAL MEDICINE

## 2023-05-01 PROCEDURE — 36415 COLL VENOUS BLD VENIPUNCTURE: CPT | Performed by: INTERNAL MEDICINE

## 2023-05-01 RX ORDER — ALLOPURINOL 100 MG/1
100 TABLET ORAL DAILY
Qty: 30 TABLET | Refills: 6 | Status: SHIPPED | OUTPATIENT
Start: 2023-05-01 | End: 2023-08-04 | Stop reason: SDUPTHER

## 2023-05-01 ASSESSMENT — ROUTINE ASSESSMENT OF PATIENT INDEX DATA (RAPID3)
WHEN YOU AWAKENED IN THE MORNING OVER THE LAST WEEK, PLEASE INDICATE THE AMOUNT OF TIME IT TAKES UNTIL YOU ARE AS LIMBER AS YOU WILL BE FOR THE DAY: 30
PAIN SCORE: 1.5
PATIENT GLOBAL ASSESSMENT SCORE: 4.5
PSYCHOLOGICAL DISTRESS SCORE: 4.4
MDHAQ FUNCTION SCORE: 0.3
AM STIFFNESS SCORE: 1, YES
TOTAL RAPID3 SCORE: 2.33
FATIGUE SCORE: 3

## 2023-05-01 NOTE — PROGRESS NOTES
History of present illness:  52-year-old gentleman I saw initially in December.  He reported a long history of crystal proven gout.  He had been followed previously by Dr. Pagan.  At the time of his visit he was supposed to be on allopurinol 400 mg daily but he thought he was only taking 300 mg daily.  He had had an acute gout attack just prior and his uric acid level was normal.  He had been taking indomethacin.  I switched him to colchicine.  I ordered further laboratory studies to make sure I was not missing any other form of arthritis.  He was supposed to see me in follow-up in 3 months.      Unfortunately he has had poor memory since he had a stroke several years ago.  He continued to have several gout attacks.  This was felt to be due to the fact that he was not taking his medications correctly.  He states he is taking a green pill once daily.  I suspect this is indomethacin.  He is also taking a white pill daily.  This may be his losartan.  If he were taking the allopurinol, this should be a yellow pill.  He did have an attack in his knee in January.  Arthrocentesis at that time revealed no crystals and his white count was only 2000.  Did have a high uric acid level.  His CRP was elevated but he had a normal sed rate.  He was given IV antibiotics but was placed on no new medicine for his gout.  He is had no further episodes.      Physical examination:  Musculoskeletal:  He has bony hypertrophy of the left hand but has had previous injury to the hand.  Right hand is unremarkable.  He has bilateral bunion deformities.  He is had no tophi.    Assessment:  1.  Inter critical gout  2.  Osteoarthritis   Plans:  1.  I told him to take pictures of the medicine bottle so I can see what he is actually on  2.  Assuming he is not taking allopurinol, I will start him back on 100 mg daily.  He will then need a uric acid level in 1 month.  3.  Assuming he is taking indomethacin, I told him to continue 1 daily until his  uric acid level normalizes  4.  Return to see me in 6 months.

## 2023-05-02 LAB — ANA SER QL IF: NORMAL

## 2023-05-03 ENCOUNTER — PATIENT MESSAGE (OUTPATIENT)
Dept: RHEUMATOLOGY | Facility: CLINIC | Age: 53
End: 2023-05-03
Payer: COMMERCIAL

## 2023-08-01 ENCOUNTER — LAB VISIT (OUTPATIENT)
Dept: LAB | Facility: HOSPITAL | Age: 53
End: 2023-08-01
Attending: INTERNAL MEDICINE
Payer: COMMERCIAL

## 2023-08-01 DIAGNOSIS — M1A.09X0 IDIOPATHIC CHRONIC GOUT OF MULTIPLE SITES WITHOUT TOPHUS: ICD-10-CM

## 2023-08-01 LAB — URATE SERPL-MCNC: 7.5 MG/DL (ref 3.4–7)

## 2023-08-01 PROCEDURE — 36415 COLL VENOUS BLD VENIPUNCTURE: CPT | Mod: PO | Performed by: INTERNAL MEDICINE

## 2023-08-01 PROCEDURE — 84550 ASSAY OF BLOOD/URIC ACID: CPT | Performed by: INTERNAL MEDICINE

## 2023-08-07 RX ORDER — ALLOPURINOL 100 MG/1
200 TABLET ORAL DAILY
Qty: 60 TABLET | Refills: 6 | Status: SHIPPED | OUTPATIENT
Start: 2023-08-07

## 2023-09-06 ENCOUNTER — OFFICE VISIT (OUTPATIENT)
Dept: FAMILY MEDICINE | Facility: CLINIC | Age: 53
End: 2023-09-06
Payer: COMMERCIAL

## 2023-09-06 VITALS
OXYGEN SATURATION: 98 % | WEIGHT: 255.63 LBS | DIASTOLIC BLOOD PRESSURE: 70 MMHG | HEIGHT: 70 IN | BODY MASS INDEX: 36.6 KG/M2 | SYSTOLIC BLOOD PRESSURE: 118 MMHG | HEART RATE: 75 BPM

## 2023-09-06 DIAGNOSIS — M1A.09X0 IDIOPATHIC CHRONIC GOUT OF MULTIPLE SITES WITHOUT TOPHUS: ICD-10-CM

## 2023-09-06 DIAGNOSIS — I10 PRIMARY HYPERTENSION: Primary | ICD-10-CM

## 2023-09-06 DIAGNOSIS — E66.01 CLASS 2 SEVERE OBESITY DUE TO EXCESS CALORIES WITH SERIOUS COMORBIDITY AND BODY MASS INDEX (BMI) OF 36.0 TO 36.9 IN ADULT: ICD-10-CM

## 2023-09-06 PROCEDURE — 3078F PR MOST RECENT DIASTOLIC BLOOD PRESSURE < 80 MM HG: ICD-10-PCS | Mod: CPTII,S$GLB,, | Performed by: NURSE PRACTITIONER

## 2023-09-06 PROCEDURE — 3074F SYST BP LT 130 MM HG: CPT | Mod: CPTII,S$GLB,, | Performed by: NURSE PRACTITIONER

## 2023-09-06 PROCEDURE — 1159F PR MEDICATION LIST DOCUMENTED IN MEDICAL RECORD: ICD-10-PCS | Mod: CPTII,S$GLB,, | Performed by: NURSE PRACTITIONER

## 2023-09-06 PROCEDURE — 99214 OFFICE O/P EST MOD 30 MIN: CPT | Mod: S$GLB,,, | Performed by: NURSE PRACTITIONER

## 2023-09-06 PROCEDURE — 1160F PR REVIEW ALL MEDS BY PRESCRIBER/CLIN PHARMACIST DOCUMENTED: ICD-10-PCS | Mod: CPTII,S$GLB,, | Performed by: NURSE PRACTITIONER

## 2023-09-06 PROCEDURE — 3074F PR MOST RECENT SYSTOLIC BLOOD PRESSURE < 130 MM HG: ICD-10-PCS | Mod: CPTII,S$GLB,, | Performed by: NURSE PRACTITIONER

## 2023-09-06 PROCEDURE — 4010F PR ACE/ARB THEARPY RXD/TAKEN: ICD-10-PCS | Mod: CPTII,S$GLB,, | Performed by: NURSE PRACTITIONER

## 2023-09-06 PROCEDURE — 3078F DIAST BP <80 MM HG: CPT | Mod: CPTII,S$GLB,, | Performed by: NURSE PRACTITIONER

## 2023-09-06 PROCEDURE — 4010F ACE/ARB THERAPY RXD/TAKEN: CPT | Mod: CPTII,S$GLB,, | Performed by: NURSE PRACTITIONER

## 2023-09-06 PROCEDURE — 1159F MED LIST DOCD IN RCRD: CPT | Mod: CPTII,S$GLB,, | Performed by: NURSE PRACTITIONER

## 2023-09-06 PROCEDURE — 3008F BODY MASS INDEX DOCD: CPT | Mod: CPTII,S$GLB,, | Performed by: NURSE PRACTITIONER

## 2023-09-06 PROCEDURE — 1160F RVW MEDS BY RX/DR IN RCRD: CPT | Mod: CPTII,S$GLB,, | Performed by: NURSE PRACTITIONER

## 2023-09-06 PROCEDURE — 99214 PR OFFICE/OUTPT VISIT, EST, LEVL IV, 30-39 MIN: ICD-10-PCS | Mod: S$GLB,,, | Performed by: NURSE PRACTITIONER

## 2023-09-06 PROCEDURE — 3008F PR BODY MASS INDEX (BMI) DOCUMENTED: ICD-10-PCS | Mod: CPTII,S$GLB,, | Performed by: NURSE PRACTITIONER

## 2023-09-06 RX ORDER — INDOMETHACIN 50 MG/1
CAPSULE ORAL
COMMUNITY

## 2023-09-06 RX ORDER — LOSARTAN POTASSIUM 50 MG/1
50 TABLET ORAL DAILY
Qty: 90 TABLET | Refills: 3 | Status: SHIPPED | OUTPATIENT
Start: 2023-09-06 | End: 2024-09-05

## 2023-09-06 NOTE — PATIENT INSTRUCTIONS
Dick Hermosillo MD- dermatology  [address] [facility] [address] [facility] [address] [facility]  [address]  [place] [address]  Phone: [phone]    Have fasting bloodwork drawn in December

## 2023-09-06 NOTE — PROGRESS NOTES
SUBJECTIVE:    Patient ID: Shayne Huerta Jr. is a 53 y.o. male.    Chief Complaint: Follow-up (No bottles//Pt is here for a checkup//ALEJANDRA )    Pt here for HTN f/u    Pt reports overall has been doing well. Had f/u with rheumatology Dr. Negro for gout in May- allopurinol dose increased to 200mg daily. reports from gout standpoint has been doing well since then    Tolerating losartan and BP has been well controlled    Received his new hearing aids            Lab Visit on 08/01/2023   Component Date Value Ref Range Status    Uric Acid 08/01/2023 7.5 (H)  3.4 - 7.0 mg/dL Final   Lab Visit on 05/01/2023   Component Date Value Ref Range Status    Sed Rate 05/01/2023 11  0 - 23 mm/Hr Final    CRP 05/01/2023 2.5  0.0 - 8.2 mg/L Final    Uric Acid 05/01/2023 8.6 (H)  3.4 - 7.0 mg/dL Final    Rheumatoid Factor 05/01/2023 <13.0  0.0 - 15.0 IU/mL Final    CCP Antibodies 05/01/2023 <0.5  <5.0 U/mL Final    JOSE Screen 05/01/2023 Negative <1:80  Negative <1:80 Final       Past Medical History:   Diagnosis Date    COVID-19     Gout, unspecified      Past Surgical History:   Procedure Laterality Date    COLONOSCOPY  01/2023    FINGER SURGERY      finger laceration- near amputation    KNEE SURGERY Left     scope    ROTATOR CUFF REPAIR Right     TONSILLECTOMY       Family History   Problem Relation Age of Onset    Cancer Father         Lung Ca       All of your core healthy metrics are met.      The 10-year CVD risk score (D'Agostino, et al., 2008) is: 13.9%    Values used to calculate the score:      Age: 53 years      Sex: Male      Diabetic: No      Tobacco smoker: No      Systolic Blood Pressure: 118 mmHg      Is BP treated: Yes      HDL Cholesterol: 36 mg/dL      Total Cholesterol: 197 mg/dL     Marital Status:   Alcohol History:  reports no history of alcohol use.  Tobacco History:  reports that he quit smoking about 12 years ago. His smoking use included cigarettes. He started smoking about 27 years ago. He has a  ----- Message from Mahendra Noonan MD sent at 6/10/2020  3:37 PM CDT -----  The patient is breathing tests show no significant change from those obtain several years ago.  There is nothing clear to explain why the patient is more short of breath.   15.0 pack-year smoking history. He has never used smokeless tobacco.  Drug History:  reports no history of drug use.    Health Maintenance Topics with due status: Not Due       Topic Last Completion Date    Hemoglobin A1c (Diabetic Prevention Screening) 10/08/2020    Lipid Panel 12/13/2022    Colorectal Cancer Screening 01/06/2023       There is no immunization history on file for this patient.    Review of patient's allergies indicates:  No Known Allergies    Current Outpatient Medications:     allopurinoL (ZYLOPRIM) 100 MG tablet, Take 2 tablets (200 mg total) by mouth once daily., Disp: 60 tablet, Rfl: 6    colchicine (COLCRYS) 0.6 mg tablet, Take 1 tablet (0.6 mg total) by mouth 2 (two) times daily. (Patient not taking: Reported on 3/1/2023), Disp: 60 tablet, Rfl: 11    indomethacin (INDOCIN) 50 MG capsule, , Disp: , Rfl:     losartan (COZAAR) 50 MG tablet, Take 1 tablet (50 mg total) by mouth once daily., Disp: 90 tablet, Rfl: 3    Review of Systems   Constitutional:  Negative for appetite change, chills, fever and unexpected weight change.   HENT:  Positive for hearing loss and tinnitus. Negative for sore throat and trouble swallowing.    Respiratory:  Negative for cough, shortness of breath and wheezing.    Cardiovascular:  Negative for chest pain, palpitations and leg swelling.   Gastrointestinal:  Negative for abdominal pain, constipation and diarrhea.   Genitourinary:  Negative for dysuria, frequency and hematuria.   Musculoskeletal:  Negative for arthralgias (left knee pain resolved since d/c), back pain, gait problem and joint swelling.   Skin:  Positive for rash (reports intermittent pruritic papules to lower legs, seems to come and go, did not call derm to schedule appt).   Neurological:  Negative for dizziness, syncope, numbness and headaches.   Psychiatric/Behavioral:  Negative for dysphoric mood. The patient is not nervous/anxious.           Objective:      Vitals:    09/06/23 1606   BP: 118/70  "  Pulse: 75   SpO2: 98%   Weight: 115.9 kg (255 lb 9.6 oz)   Height: 5' 10" (1.778 m)     Physical Exam  Vitals reviewed.   Constitutional:       General: He is not in acute distress.     Appearance: He is well-developed. He is obese.   HENT:      Head: Normocephalic and atraumatic.   Neck:      Vascular: No carotid bruit.   Cardiovascular:      Rate and Rhythm: Normal rate and regular rhythm.      Heart sounds: No murmur heard.  Pulmonary:      Effort: Pulmonary effort is normal. No respiratory distress.      Breath sounds: Normal breath sounds. No wheezing or rales.   Abdominal:      General: There is no distension.      Palpations: Abdomen is soft.      Tenderness: There is no abdominal tenderness.   Musculoskeletal:      Right wrist: No swelling or deformity.      Cervical back: Neck supple.      Right lower leg: No edema.      Left lower leg: No edema.   Lymphadenopathy:      Cervical: No cervical adenopathy.   Skin:     General: Skin is warm and dry.      Findings: No rash.   Neurological:      General: No focal deficit present.      Mental Status: He is alert and oriented to person, place, and time.      Gait: Gait normal.   Psychiatric:         Mood and Affect: Mood normal.           Assessment:       1. Primary hypertension    2. Idiopathic chronic gout of multiple sites without tophus    3. Class 2 severe obesity due to excess calories with serious comorbidity and body mass index (BMI) of 36.0 to 36.9 in adult           Plan:       1. Primary hypertension  -BP well controlled, due for annual labs in December  -     CBC Auto Differential; Future; Expected date: 09/06/2023  -     Comprehensive Metabolic Panel; Future; Expected date: 09/06/2023  -     Lipid Panel; Future; Expected date: 09/06/2023  -     Urinalysis, Reflex to Urine Culture Urine, Clean Catch; Future; Expected date: 09/06/2023  -     Microalbumin/Creatinine Ratio, Urine; Future; Expected date: 09/06/2023  -     TSH w/reflex to FT4; Future; " Expected date: 09/06/2023  -     losartan (COZAAR) 50 MG tablet; Take 1 tablet (50 mg total) by mouth once daily.  Dispense: 90 tablet; Refill: 3    2. Idiopathic chronic gout of multiple sites without tophus   -stable on allopurinol, follow-up with rheumatology as scheduled    3. Class 2 severe obesity due to excess calories with serious comorbidity and body mass index (BMI) of 36.0 to 36.9 in adult   -discussed importance of healthy diet and regular exercise, encouraged 5-10 lb weight loss    Follow up in about 6 months (around 3/6/2024) for HTN.          Counseled on age and gender appropriate medical preventative services, including cancer screenings, immunizations, overall nutritional health, need for a consistent exercise regimen and an overall push towards maintaining a vigorous and active lifestyle.      9/6/2023 Becky Brown NP

## 2023-11-17 ENCOUNTER — TELEPHONE (OUTPATIENT)
Dept: FAMILY MEDICINE | Facility: CLINIC | Age: 53
End: 2023-11-17

## 2023-12-05 ENCOUNTER — TELEPHONE (OUTPATIENT)
Dept: FAMILY MEDICINE | Facility: CLINIC | Age: 53
End: 2023-12-05

## 2023-12-05 NOTE — TELEPHONE ENCOUNTER
----- Message from RT Philip sent at 12/4/2023  9:09 AM CST -----    ----- Message -----  From: Becky Brown NP  Sent: 12/4/2023  12:00 AM CST  To: Becky Brown Staff    Please call pt and remind him to have labs drawn in Dec

## 2023-12-05 NOTE — TELEPHONE ENCOUNTER
Left message that fasting lab and urine is due in December per Becky,  orders at [facility], encouraged water, advised he can take morning meds that do not need to be taken with food. Updated remind me.

## 2023-12-12 ENCOUNTER — TELEPHONE (OUTPATIENT)
Dept: FAMILY MEDICINE | Facility: CLINIC | Age: 53
End: 2023-12-12

## 2023-12-12 NOTE — TELEPHONE ENCOUNTER
Contacting patient about recent call.   Patient states he works for the [employer] and is the busy season. The man who works with patient has had a knee replacement and now patient is working by himself 60 hours a week. Unable to cover everything at work and getting very stressed.   Wanting to speak to provider about this.

## 2023-12-12 NOTE — TELEPHONE ENCOUNTER
----- Message from Norma Casiano sent at 12/12/2023  3:39 PM CST -----  Pt is having some stress issues and work is asking for him to be seen by us   [phone]

## 2023-12-12 NOTE — TELEPHONE ENCOUNTER
Please let patient know unfortunately I am booked tomorrow and out next week.  Can we see if we could get him in with someone else next week?

## 2023-12-19 ENCOUNTER — TELEPHONE (OUTPATIENT)
Dept: FAMILY MEDICINE | Facility: CLINIC | Age: 53
End: 2023-12-19
Payer: COMMERCIAL

## 2023-12-19 NOTE — TELEPHONE ENCOUNTER
Left mess that fasting lab and urine are due in December per Dr Puckett, orders at [facility], updated remind me.

## 2023-12-20 ENCOUNTER — OFFICE VISIT (OUTPATIENT)
Dept: FAMILY MEDICINE | Facility: CLINIC | Age: 53
End: 2023-12-20
Payer: COMMERCIAL

## 2023-12-20 VITALS
HEART RATE: 81 BPM | WEIGHT: 250 LBS | SYSTOLIC BLOOD PRESSURE: 128 MMHG | OXYGEN SATURATION: 97 % | HEIGHT: 70 IN | BODY MASS INDEX: 35.79 KG/M2 | DIASTOLIC BLOOD PRESSURE: 76 MMHG

## 2023-12-20 DIAGNOSIS — Z12.5 ENCOUNTER FOR SCREENING FOR MALIGNANT NEOPLASM OF PROSTATE: ICD-10-CM

## 2023-12-20 DIAGNOSIS — F41.8 SITUATIONAL ANXIETY: Primary | ICD-10-CM

## 2023-12-20 DIAGNOSIS — M10.9 GOUT, UNSPECIFIED CAUSE, UNSPECIFIED CHRONICITY, UNSPECIFIED SITE: ICD-10-CM

## 2023-12-20 PROBLEM — Z23 NEED FOR VACCINATION: Status: ACTIVE | Noted: 2023-12-20

## 2023-12-20 PROBLEM — R55 SYNCOPE: Status: ACTIVE | Noted: 2023-12-20

## 2023-12-20 PROCEDURE — 4010F PR ACE/ARB THEARPY RXD/TAKEN: ICD-10-PCS | Mod: CPTII,S$GLB,,

## 2023-12-20 PROCEDURE — 1159F MED LIST DOCD IN RCRD: CPT | Mod: CPTII,S$GLB,,

## 2023-12-20 PROCEDURE — 3078F PR MOST RECENT DIASTOLIC BLOOD PRESSURE < 80 MM HG: ICD-10-PCS | Mod: CPTII,S$GLB,,

## 2023-12-20 PROCEDURE — 99214 OFFICE O/P EST MOD 30 MIN: CPT | Mod: S$GLB,,,

## 2023-12-20 PROCEDURE — 1159F PR MEDICATION LIST DOCUMENTED IN MEDICAL RECORD: ICD-10-PCS | Mod: CPTII,S$GLB,,

## 2023-12-20 PROCEDURE — 3074F PR MOST RECENT SYSTOLIC BLOOD PRESSURE < 130 MM HG: ICD-10-PCS | Mod: CPTII,S$GLB,,

## 2023-12-20 PROCEDURE — 99214 PR OFFICE/OUTPT VISIT, EST, LEVL IV, 30-39 MIN: ICD-10-PCS | Mod: S$GLB,,,

## 2023-12-20 PROCEDURE — 3008F BODY MASS INDEX DOCD: CPT | Mod: CPTII,S$GLB,,

## 2023-12-20 PROCEDURE — 3074F SYST BP LT 130 MM HG: CPT | Mod: CPTII,S$GLB,,

## 2023-12-20 PROCEDURE — 1160F PR REVIEW ALL MEDS BY PRESCRIBER/CLIN PHARMACIST DOCUMENTED: ICD-10-PCS | Mod: CPTII,S$GLB,,

## 2023-12-20 PROCEDURE — 4010F ACE/ARB THERAPY RXD/TAKEN: CPT | Mod: CPTII,S$GLB,,

## 2023-12-20 PROCEDURE — 3078F DIAST BP <80 MM HG: CPT | Mod: CPTII,S$GLB,,

## 2023-12-20 PROCEDURE — 3008F PR BODY MASS INDEX (BMI) DOCUMENTED: ICD-10-PCS | Mod: CPTII,S$GLB,,

## 2023-12-20 PROCEDURE — 1160F RVW MEDS BY RX/DR IN RCRD: CPT | Mod: CPTII,S$GLB,,

## 2023-12-20 RX ORDER — BUPROPION HYDROCHLORIDE 150 MG/1
150 TABLET ORAL DAILY
Qty: 30 TABLET | Refills: 11 | Status: SHIPPED | OUTPATIENT
Start: 2023-12-20 | End: 2024-03-10

## 2023-12-20 NOTE — LETTER
December 20, 2023      [facility]  [address] [place] [id number]  Phone: [phone]  Fax: [phone]       Patient: Shayne Huerta   YOB: 1970  Date of Visit: 12/20/2023    To Whom It May Concern:    Anastacio Huerta  was at [facility] on 12/20/2023. The patient may return to work/school on 12/26/2023 with restrictions. He is restricted to working no more than a 40 hour work week, for at least the next 6 weeks, to then be reevaluated at that time. If you have any questions or concerns, or if I can be of further assistance, please do not hesitate to contact me.    Sincerely,    KYLIE Duffy-CNP

## 2023-12-20 NOTE — PROGRESS NOTES
SUBJECTIVE:    Patient ID: Shayne Huerta Jr. is a 53 y.o. male.    Chief Complaint: Headache (No bottles, pt complains he has been having a stress headache for 2 weeks, not sleeping good, sometime blurry vision, pt stated he recently his job increased his hours and he is needing a letter saying he can only work 40 hours, declined flu//BA )    53-year-old established male patient presents to clinic today to discuss complaints of extreme fatigue, headaches, trouble sleeping.  Patient reports that he has been having difficulty at work with keeping a full staff, which means the extra work load falls onto him.  He states this is very stressful.  He does work across the [facility], which means a commute back and forth each day, which is also very stressful.  He also states that the holidays are always very stressful time at his job.  He is also having some additional stressors in his life outside of work.    I did administer a OMAIRA-7 today on which the patient scored a 17.  He reports inability to relax.  He states that his mind is always racing.  He is always thinking of things that he forgot to do, that he needs to do, that he does not have time to do, etc.   He states that when he lays down to go to bed at night his mind will not stop and he can not relax to fall asleep.  He feels that this is taking a physical toll on his body.  We did discuss options today to manage how he josr with his stressors.  We discussed several medication options.  Patient states that he has been on Wellbutrin in the past when he quit nicotine.  He states he tolerated Wellbutrin and does not recall any adverse effects.  We will start this today.  I did advise that he not return to work for a few days to allow his body to adjust the medication in case of any dizziness or other side effects due to his long commute for safety.  Patient does agree with this plan.  We did discuss that medication may not always be necessary, as patient did express  "some concerns about having to be on medication "forever."    He does feel that his headaches are tension headaches.  He takes Tylenol and they typically do relieve.    Headache   Associated symptoms include a sore throat and weakness. Pertinent negatives include no abdominal pain, coughing, dizziness, ear pain, eye pain, fever, nausea, numbness, rhinorrhea or vomiting.       Lab Visit on 2023   Component Date Value Ref Range Status    Uric Acid 2023 7.5 (H)  3.4 - 7.0 mg/dL Final       Past Medical History:   Diagnosis Date    COVID-19     Gout, unspecified      Social History     Socioeconomic History    Marital status:    Tobacco Use    Smoking status: Former     Current packs/day: 0.00     Average packs/day: 1 pack/day for 15.0 years (15.0 ttl pk-yrs)     Types: Cigarettes     Start date: 1996     Quit date: 2011     Years since quittin.8    Smokeless tobacco: Never   Substance and Sexual Activity    Alcohol use: No    Drug use: No    Sexual activity: Yes     Past Surgical History:   Procedure Laterality Date    COLONOSCOPY  2023    FINGER SURGERY      finger laceration- near amputation    KNEE SURGERY Left     scope    ROTATOR CUFF REPAIR Right     TONSILLECTOMY       Family History   Problem Relation Age of Onset    Cancer Father         Lung Ca       Review of patient's allergies indicates:  No Known Allergies    Current Outpatient Medications:     allopurinoL (ZYLOPRIM) 100 MG tablet, Take 2 tablets (200 mg total) by mouth once daily., Disp: 60 tablet, Rfl: 6    ammonium lactate 12 % Crea, Aaa qd after shower prn dry skin, Disp: 385 g, Rfl: 11    clotrimazole-betamethasone 1-0.05% (LOTRISONE) cream, Aaa bid up to 1 wk, tk 1 wk off before repeating prn scaling on ears/nose, Disp: 45 g, Rfl: 3    colchicine (COLCRYS) 0.6 mg tablet, Take 1 tablet (0.6 mg total) by mouth 2 (two) times daily., Disp: 60 tablet, Rfl: 11    fluorouraciL (EFUDEX) 5 % cream, Apply to area/precancer " lesions twice daily for up to 2 weeks.  Stop if excess irritation occurs.  May repeat treatment after healed, Disp: 40 g, Rfl: 3    indomethacin (INDOCIN) 50 MG capsule, , Disp: , Rfl:     ketoconazole (NIZORAL) 2 % shampoo, Wash all scaling areas let sit 3 minutes then rinse 3x/wk, Disp: 120 mL, Rfl: 11    losartan (COZAAR) 50 MG tablet, Take 1 tablet (50 mg total) by mouth once daily., Disp: 90 tablet, Rfl: 3    triamcinolone acetonide 0.1% (KENALOG) 0.1 % cream, Aaa bid prn itching/rash on legs, Disp: 80 g, Rfl: 3    buPROPion (WELLBUTRIN XL) 150 MG TB24 tablet, Take 1 tablet (150 mg total) by mouth once daily., Disp: 30 tablet, Rfl: 11    Review of Systems   Constitutional:  Positive for appetite change, fatigue and unexpected weight change. Negative for activity change, chills and fever.   HENT:  Positive for sore throat. Negative for nasal congestion, ear pain, rhinorrhea and trouble swallowing.    Eyes:  Negative for pain, discharge and visual disturbance.        Tire easily, works on computer     Respiratory:  Negative for cough, chest tightness, shortness of breath and wheezing.    Cardiovascular:  Negative for chest pain, palpitations, leg swelling and claudication.   Gastrointestinal:  Positive for reflux. Negative for abdominal pain, blood in stool, constipation, diarrhea, nausea and vomiting.   Endocrine: Negative for polydipsia, polyphagia and polyuria.   Genitourinary:  Negative for bladder incontinence, difficulty urinating, dysuria, frequency, hematuria and urgency.   Musculoskeletal:  Positive for arthralgias. Negative for gait problem, joint swelling and myalgias.   Integumentary:  Negative for rash.        Skin ca tx from derm   Neurological:  Positive for weakness and headaches. Negative for dizziness, tremors, light-headedness and numbness.   Hematological:  Does not bruise/bleed easily.   Psychiatric/Behavioral:  Positive for sleep disturbance. Negative for dysphoric mood and suicidal ideas.  "The patient is not nervous/anxious.            Objective:      Vitals:    12/20/23 1339   BP: 128/76   Pulse: 81   SpO2: 97%   Weight: 113.4 kg (250 lb)   Height: 5' 10" (1.778 m)     Physical Exam  Constitutional:       General: He is in acute distress.      Appearance: He is overweight.      Comments: Patient is very anxious appearing today   HENT:      Head: Normocephalic and atraumatic.      Nose: Nose normal.      Mouth/Throat:      Mouth: Mucous membranes are moist.      Pharynx: Oropharynx is clear. No posterior oropharyngeal erythema.   Eyes:      General: No scleral icterus.     Pupils: Pupils are equal, round, and reactive to light.   Neck:      Vascular: No carotid bruit.   Cardiovascular:      Rate and Rhythm: Normal rate and regular rhythm.      Pulses: Normal pulses.      Heart sounds: Normal heart sounds. No murmur heard.  Pulmonary:      Effort: Pulmonary effort is normal. No respiratory distress.      Breath sounds: Normal breath sounds.   Abdominal:      Palpations: Abdomen is soft.      Tenderness: There is no abdominal tenderness.   Musculoskeletal:      Cervical back: Normal range of motion.      Right lower leg: No edema.      Left lower leg: No edema.   Skin:     General: Skin is warm and dry.      Capillary Refill: Capillary refill takes less than 2 seconds.      Coloration: Skin is not jaundiced or pale.      Comments: Patient has been undergoing treatment for multiple actinic keratoses with Dermatology so he has multiple healing removal sites to his arms   Neurological:      Mental Status: He is alert and oriented to person, place, and time.      Cranial Nerves: No cranial nerve deficit, dysarthria or facial asymmetry.      Sensory: No sensory deficit.      Motor: No weakness or tremor.      Coordination: Coordination normal.      Gait: Gait normal.   Psychiatric:         Attention and Perception: Attention normal.         Mood and Affect: Mood is anxious.         Speech: Speech normal.    "      Behavior: Behavior is cooperative.         Thought Content: Thought content normal.         Cognition and Memory: Cognition normal.         Judgment: Judgment normal.           Assessment:       1. Situational anxiety    2. Encounter for screening for malignant neoplasm of prostate    3. Gout, unspecified cause, unspecified chronicity, unspecified site         Plan:       Situational anxiety  Discussed medication with the patient today.  I did advise regarding potential side effects.  We did discuss not stopping this medication without weaning.  Patient has been on Wellbutrin before to help him stop smoking and he did tolerate it well in the past.  We did discuss his difficulty sleeping.  He states that he does not want to take any other medication to help him sleep.  He states that he will try maybe melatonin over-the-counter.  I did advise him that if he feels like he has not getting proper rest to let me know.  He also understands that he will need a short course follow-up because of starting this new medication.  -     buPROPion (WELLBUTRIN XL) 150 MG TB24 tablet; Take 1 tablet (150 mg total) by mouth once daily.  Dispense: 30 tablet; Refill: 11    Encounter for screening for malignant neoplasm of prostate  Patient states he is going to complete his labs today that were ordered by his PCP at last visit, he would like PSA added  -     PSA, Screening; Future; Expected date: 12/20/2023    Gout, unspecified cause, unspecified chronicity, unspecified site  -     Uric Acid; Future; Expected date: 12/20/2023      Follow up in about 6 weeks (around 1/31/2024).        12/20/2023 Norma Crawford

## 2023-12-21 ENCOUNTER — TELEPHONE (OUTPATIENT)
Dept: FAMILY MEDICINE | Facility: CLINIC | Age: 53
End: 2023-12-21
Payer: COMMERCIAL

## 2023-12-21 LAB
ALBUMIN SERPL-MCNC: 5 G/DL (ref 3.6–5.1)
ALBUMIN/CREAT UR: 13 MCG/MG CREAT
ALBUMIN/GLOB SERPL: 2.1 (CALC) (ref 1–2.5)
ALP SERPL-CCNC: 74 U/L (ref 35–144)
ALT SERPL-CCNC: 88 U/L (ref 9–46)
APPEARANCE UR: CLEAR
AST SERPL-CCNC: 35 U/L (ref 10–35)
BACTERIA #/AREA URNS HPF: NORMAL /HPF
BACTERIA UR CULT: NORMAL
BASOPHILS # BLD AUTO: 47 CELLS/UL (ref 0–200)
BASOPHILS NFR BLD AUTO: 0.4 %
BILIRUB SERPL-MCNC: 0.7 MG/DL (ref 0.2–1.2)
BILIRUB UR QL STRIP: NEGATIVE
BUN SERPL-MCNC: 7 MG/DL (ref 7–25)
BUN/CREAT SERPL: ABNORMAL (CALC) (ref 6–22)
CALCIUM SERPL-MCNC: 10.2 MG/DL (ref 8.6–10.3)
CHLORIDE SERPL-SCNC: 103 MMOL/L (ref 98–110)
CHOLEST SERPL-MCNC: 217 MG/DL
CHOLEST/HDLC SERPL: 5.3 (CALC)
CO2 SERPL-SCNC: 30 MMOL/L (ref 20–32)
COLOR UR: YELLOW
CREAT SERPL-MCNC: 1 MG/DL (ref 0.7–1.3)
CREAT UR-MCNC: 172 MG/DL (ref 20–320)
EGFR: 91 ML/MIN/1.73M2
EOSINOPHIL # BLD AUTO: 234 CELLS/UL (ref 15–500)
EOSINOPHIL NFR BLD AUTO: 2 %
ERYTHROCYTE [DISTWIDTH] IN BLOOD BY AUTOMATED COUNT: 13.2 % (ref 11–15)
GLOBULIN SER CALC-MCNC: 2.4 G/DL (CALC) (ref 1.9–3.7)
GLUCOSE SERPL-MCNC: 90 MG/DL (ref 65–99)
GLUCOSE UR QL STRIP: NEGATIVE
HCT VFR BLD AUTO: 52.4 % (ref 38.5–50)
HDLC SERPL-MCNC: 41 MG/DL
HGB BLD-MCNC: 18 G/DL (ref 13.2–17.1)
HGB UR QL STRIP: NEGATIVE
HYALINE CASTS #/AREA URNS LPF: NORMAL /LPF
KETONES UR QL STRIP: NEGATIVE
LDLC SERPL CALC-MCNC: 136 MG/DL (CALC)
LEUKOCYTE ESTERASE UR QL STRIP: NEGATIVE
LYMPHOCYTES # BLD AUTO: 1872 CELLS/UL (ref 850–3900)
LYMPHOCYTES NFR BLD AUTO: 16 %
MCH RBC QN AUTO: 29.3 PG (ref 27–33)
MCHC RBC AUTO-ENTMCNC: 34.4 G/DL (ref 32–36)
MCV RBC AUTO: 85.3 FL (ref 80–100)
MICROALBUMIN UR-MCNC: 2.2 MG/DL
MONOCYTES # BLD AUTO: 761 CELLS/UL (ref 200–950)
MONOCYTES NFR BLD AUTO: 6.5 %
NEUTROPHILS # BLD AUTO: 8787 CELLS/UL (ref 1500–7800)
NEUTROPHILS NFR BLD AUTO: 75.1 %
NITRITE UR QL STRIP: NEGATIVE
NONHDLC SERPL-MCNC: 176 MG/DL (CALC)
PH UR STRIP: 5.5 [PH] (ref 5–8)
PLATELET # BLD AUTO: 283 THOUSAND/UL (ref 140–400)
PMV BLD REES-ECKER: 11.1 FL (ref 7.5–12.5)
POTASSIUM SERPL-SCNC: 4.3 MMOL/L (ref 3.5–5.3)
PROT SERPL-MCNC: 7.4 G/DL (ref 6.1–8.1)
PROT UR QL STRIP: NEGATIVE
PSA SERPL-MCNC: 0.63 NG/ML
RBC # BLD AUTO: 6.14 MILLION/UL (ref 4.2–5.8)
RBC #/AREA URNS HPF: NORMAL /HPF
SERVICE CMNT-IMP: NORMAL
SODIUM SERPL-SCNC: 143 MMOL/L (ref 135–146)
SP GR UR STRIP: 1.01 (ref 1–1.03)
SQUAMOUS #/AREA URNS HPF: NORMAL /HPF
TRIGL SERPL-MCNC: 245 MG/DL
TSH SERPL-ACNC: 1.39 MIU/L (ref 0.4–4.5)
URATE SERPL-MCNC: 6.3 MG/DL (ref 4–8)
WBC # BLD AUTO: 11.7 THOUSAND/UL (ref 3.8–10.8)
WBC #/AREA URNS HPF: NORMAL /HPF

## 2023-12-22 ENCOUNTER — TELEPHONE (OUTPATIENT)
Dept: FAMILY MEDICINE | Facility: CLINIC | Age: 53
End: 2023-12-22
Payer: COMMERCIAL

## 2023-12-22 NOTE — TELEPHONE ENCOUNTER
Spoke with patient informed [facility] request for Therapeutic Phlebotomy form ready for . Patient verbalized understanding.

## 2023-12-22 NOTE — TELEPHONE ENCOUNTER
----- Message from KYLIE Duffy-CNP sent at 12/21/2023  6:30 PM CST -----  Uric acid best its been in a while.  Psa normal  Thyroid normal  No concerning protein in the urine  Cholesterol is a problem. Total has gone up form 197 to 217, triglycerides have gone up from 152 to 245! And bad cholesterol is pretty stable, only up from 133 to 136. Got to get this under better control.  Those high triglycerides could be why one of your liver enzymes is a bit elevated. (It has been chronically for a while, but higher than usual.)  Your blood cell count is elevated a little- have you been using any testosterone replacement?

## 2023-12-22 NOTE — TELEPHONE ENCOUNTER
Spoke with patient gave lab results verbatim per Sylvia TRUJILLO, not taking Testerone replacement. Will change diet to help cholesterol, changed diet due to gout. Patient will have oatmeal at breakfast, flaxseed. Has been eating a lot of pork. Will reduce pork and substitute for fish and chicken. Patient verbalized understanding to all.       Per Sylvia he will need to donate blood due to elevated blood cell count.

## 2024-01-03 ENCOUNTER — TELEPHONE (OUTPATIENT)
Dept: FAMILY MEDICINE | Facility: CLINIC | Age: 54
End: 2024-01-03
Payer: COMMERCIAL

## 2024-01-03 NOTE — TELEPHONE ENCOUNTER
----- Message from Norma Casiano sent at 1/3/2024  3:13 PM CST -----  - 1:59- pt is calling to see how to go about getting a sleep apnea test   [phone]

## 2024-01-04 ENCOUNTER — TELEPHONE (OUTPATIENT)
Dept: FAMILY MEDICINE | Facility: CLINIC | Age: 54
End: 2024-01-04
Payer: COMMERCIAL

## 2024-01-04 DIAGNOSIS — G47.30 SLEEP APNEA, UNSPECIFIED TYPE: Primary | ICD-10-CM

## 2024-01-04 NOTE — TELEPHONE ENCOUNTER
----- Message from Trish Grant MA sent at 1/4/2024 11:22 AM CST -----    ----- Message -----  From: Norma Casiano  Sent: 1/4/2024  11:18 AM CST  To: Arnel Puckett Staff    Pt girlfriend is telling him he snores a lot and sometimes sounds like he stops breathing. Would like to have a sleep study done   [phone]

## 2024-01-04 NOTE — TELEPHONE ENCOUNTER
----- Message from Beatrice Kennedy sent at 1/4/2024  3:23 PM CST -----  The patient is returning Marilee's call pt's # [phone] GH

## 2024-01-08 ENCOUNTER — TELEPHONE (OUTPATIENT)
Dept: PULMONOLOGY | Facility: CLINIC | Age: 54
End: 2024-01-08

## 2024-01-08 ENCOUNTER — TELEPHONE (OUTPATIENT)
Dept: FAMILY MEDICINE | Facility: CLINIC | Age: 54
End: 2024-01-08
Payer: COMMERCIAL

## 2024-01-08 NOTE — TELEPHONE ENCOUNTER
----- Message from Becky Lance sent at 1/8/2024 11:31 AM CST -----  Pt is returning the office call. Pt #[phone]

## 2024-01-08 NOTE — TELEPHONE ENCOUNTER
Spoke with pt and informed him that we were calling about his sleep apnea message but it looks like he was referred to Pulmonology by Norma. Pt voiced understanding and stated he had spoken with them today.

## 2024-01-31 ENCOUNTER — OFFICE VISIT (OUTPATIENT)
Dept: FAMILY MEDICINE | Facility: CLINIC | Age: 54
End: 2024-01-31
Payer: COMMERCIAL

## 2024-01-31 ENCOUNTER — PATIENT MESSAGE (OUTPATIENT)
Dept: FAMILY MEDICINE | Facility: CLINIC | Age: 54
End: 2024-01-31

## 2024-01-31 ENCOUNTER — HOSPITAL ENCOUNTER (OUTPATIENT)
Dept: RADIOLOGY | Facility: HOSPITAL | Age: 54
Discharge: HOME | End: 2024-01-31
Payer: COMMERCIAL

## 2024-01-31 VITALS
HEIGHT: 70 IN | DIASTOLIC BLOOD PRESSURE: 78 MMHG | OXYGEN SATURATION: 97 % | BODY MASS INDEX: 36.25 KG/M2 | HEART RATE: 67 BPM | SYSTOLIC BLOOD PRESSURE: 110 MMHG | WEIGHT: 253.19 LBS

## 2024-01-31 DIAGNOSIS — M54.42 CHRONIC MIDLINE LOW BACK PAIN WITH LEFT-SIDED SCIATICA: ICD-10-CM

## 2024-01-31 DIAGNOSIS — F41.8 SITUATIONAL ANXIETY: Primary | ICD-10-CM

## 2024-01-31 DIAGNOSIS — H53.8 BLURRY VISION, BILATERAL: ICD-10-CM

## 2024-01-31 DIAGNOSIS — G89.29 CHRONIC MIDLINE LOW BACK PAIN WITH LEFT-SIDED SCIATICA: ICD-10-CM

## 2024-01-31 DIAGNOSIS — M25.552 LEFT HIP PAIN: ICD-10-CM

## 2024-01-31 PROBLEM — E66.01 CLASS 2 SEVERE OBESITY DUE TO EXCESS CALORIES WITH SERIOUS COMORBIDITY AND BODY MASS INDEX (BMI) OF 36.0 TO 36.9 IN ADULT: Status: ACTIVE | Noted: 2024-01-31

## 2024-01-31 PROBLEM — E66.812 CLASS 2 SEVERE OBESITY DUE TO EXCESS CALORIES WITH SERIOUS COMORBIDITY AND BODY MASS INDEX (BMI) OF 36.0 TO 36.9 IN ADULT: Status: ACTIVE | Noted: 2024-01-31

## 2024-01-31 PROCEDURE — 3078F DIAST BP <80 MM HG: CPT | Mod: CPTII,S$GLB,,

## 2024-01-31 PROCEDURE — 73502 X-RAY EXAM HIP UNI 2-3 VIEWS: CPT | Mod: TC,PO,LT

## 2024-01-31 PROCEDURE — 3008F BODY MASS INDEX DOCD: CPT | Mod: CPTII,S$GLB,,

## 2024-01-31 PROCEDURE — 3074F SYST BP LT 130 MM HG: CPT | Mod: CPTII,S$GLB,,

## 2024-01-31 PROCEDURE — 99213 OFFICE O/P EST LOW 20 MIN: CPT | Mod: S$GLB,,,

## 2024-01-31 PROCEDURE — 72110 X-RAY EXAM L-2 SPINE 4/>VWS: CPT | Mod: TC,PO

## 2024-01-31 RX ORDER — BUSPIRONE HYDROCHLORIDE 5 MG/1
5 TABLET ORAL 3 TIMES DAILY PRN
Qty: 90 TABLET | Refills: 11 | Status: SHIPPED | OUTPATIENT
Start: 2024-01-31 | End: 2025-01-30

## 2024-01-31 RX ORDER — METHOCARBAMOL 500 MG/1
500 TABLET, FILM COATED ORAL NIGHTLY PRN
Qty: 30 TABLET | Refills: 0 | Status: SHIPPED | OUTPATIENT
Start: 2024-01-31

## 2024-01-31 NOTE — LETTER
January 31, 2024      [facility] [address]  [address] [place] [address]  Phone: [phone]  Fax: [phone]       Patient: Shayne Huerta   YOB: 1970  Date of Visit: 01/31/2024    To Whom It May Concern:    Anastacio Huerta  was at [facility] on 01/31/2024. The patient may return to work/school on 2/1/2024 with restrictions. He is restricted to working 40 hours or less for the next 6 weeks, possibly longer, to be reevaluated at that time. If you have any questions or concerns, or if I can be of further assistance, please do not hesitate to contact me.    Sincerely,    KYLIE Duffy-CNP

## 2024-01-31 NOTE — PROGRESS NOTES
SUBJECTIVE:    Patient ID: Shayne Huerta Jr. is a 53 y.o. male.    Chief Complaint: Anxiety (6 week follow up for anxiety, states feels the same, medication feels like sometimes works and sometimes doesn't/ declines flu vaccine/ discuss lab work/ right eye above eyebrow feels twitching every once in a while/ had 2 episodes of twitching in past week/back pain x 4 days states chiropractor says has pinched nerve on low back//mp)    53 year old male patient presents today for follow up from previous visit for anxiety.  He states he feels the same, medication feels like sometimes works and sometimes doesn't.     Reports fatigue, ongoing.     Also reports right eye above eyebrow feels twitchy every once. had 2 episodes of twitching in past week. Some blurry vision.    Has also had back pain x 4 days. states chiropractor says has pinched nerve on low back. Chiropractor did not order any imaging.         Anxiety  Symptoms include nervous/anxious behavior. Patient reports no chest pain, dizziness, nausea, palpitations, shortness of breath or suicidal ideas.           Office Visit on 12/20/2023   Component Date Value Ref Range Status    Uric Acid 12/20/2023 6.3  4.0 - 8.0 mg/dL Final    PROSTATE SPECIFIC ANTIGEN, SCR - Q* 12/20/2023 0.63  < OR = 4.00 ng/mL Final   Office Visit on 09/06/2023   Component Date Value Ref Range Status    WBC 12/20/2023 11.7 (H)  3.8 - 10.8 Thousand/uL Final    RBC 12/20/2023 6.14 (H)  4.20 - 5.80 Million/uL Final    Hemoglobin 12/20/2023 18.0 (H)  13.2 - 17.1 g/dL Final    Hematocrit 12/20/2023 52.4 (H)  38.5 - 50.0 % Final    MCV 12/20/2023 85.3  80.0 - 100.0 fL Final    MCH 12/20/2023 29.3  27.0 - 33.0 pg Final    MCHC 12/20/2023 34.4  32.0 - 36.0 g/dL Final    RDW 12/20/2023 13.2  11.0 - 15.0 % Final    Platelets 12/20/2023 283  140 - 400 Thousand/uL Final    MPV 12/20/2023 11.1  7.5 - 12.5 fL Final    Neutrophils, Abs 12/20/2023 8,787 (H)  1,500 - 7,800 cells/uL Final    Lymph # 12/20/2023  1,872  850 - 3,900 cells/uL Final    Mono # 12/20/2023 761  200 - 950 cells/uL Final    Eos # 12/20/2023 234  15 - 500 cells/uL Final    Baso # 12/20/2023 47  0 - 200 cells/uL Final    Neutrophils Relative 12/20/2023 75.1  % Final    Lymph % 12/20/2023 16.0  % Final    Mono % 12/20/2023 6.5  % Final    Eosinophil % 12/20/2023 2.0  % Final    Basophil % 12/20/2023 0.4  % Final    Glucose 12/20/2023 90  65 - 99 mg/dL Final    BUN 12/20/2023 7  7 - 25 mg/dL Final    Creatinine 12/20/2023 1.00  0.70 - 1.30 mg/dL Final    eGFR 12/20/2023 91  > OR = 60 mL/min/1.73m2 Final    BUN/Creatinine Ratio 12/20/2023 SEE NOTE:  6 - 22 (calc) Final    Sodium 12/20/2023 143  135 - 146 mmol/L Final    Potassium 12/20/2023 4.3  3.5 - 5.3 mmol/L Final    Chloride 12/20/2023 103  98 - 110 mmol/L Final    CO2 12/20/2023 30  20 - 32 mmol/L Final    Calcium 12/20/2023 10.2  8.6 - 10.3 mg/dL Final    Total Protein 12/20/2023 7.4  6.1 - 8.1 g/dL Final    Albumin 12/20/2023 5.0  3.6 - 5.1 g/dL Final    Globulin, Total 12/20/2023 2.4  1.9 - 3.7 g/dL (calc) Final    Albumin/Globulin Ratio 12/20/2023 2.1  1.0 - 2.5 (calc) Final    Total Bilirubin 12/20/2023 0.7  0.2 - 1.2 mg/dL Final    Alkaline Phosphatase 12/20/2023 74  35 - 144 U/L Final    AST 12/20/2023 35  10 - 35 U/L Final    ALT 12/20/2023 88 (H)  9 - 46 U/L Final    Cholesterol 12/20/2023 217 (H)  <200 mg/dL Final    HDL 12/20/2023 41  > OR = 40 mg/dL Final    Triglycerides 12/20/2023 245 (H)  <150 mg/dL Final    LDL Cholesterol 12/20/2023 136 (H)  mg/dL (calc) Final    HDL/Cholesterol Ratio 12/20/2023 5.3 (H)  <5.0 (calc) Final    Non HDL Chol. (LDL+VLDL) 12/20/2023 176 (H)  <130 mg/dL (calc) Final    Color, UA 12/20/2023 YELLOW  YELLOW Final    Appearance, UA 12/20/2023 CLEAR  CLEAR Final    Specific Gravity, UA 12/20/2023 1.014  1.001 - 1.035 Final    pH, UA 12/20/2023 5.5  5.0 - 8.0 Final    Glucose, UA 12/20/2023 NEGATIVE  NEGATIVE Final    Bilirubin, UA 12/20/2023 NEGATIVE  NEGATIVE  Final    Ketones, UA 2023 NEGATIVE  NEGATIVE Final    Occult Blood UA 2023 NEGATIVE  NEGATIVE Final    Protein, UA 2023 NEGATIVE  NEGATIVE Final    Nitrite, UA 2023 NEGATIVE  NEGATIVE Final    Leukocytes, UA 2023 NEGATIVE  NEGATIVE Final    WBC Casts, UA 2023 NONE SEEN  < OR = 5 /HPF Final    RBC Casts, UA 2023 0-2  < OR = 2 /HPF Final    Squam Epithel, UA 2023 NONE SEEN  < OR = 5 /HPF Final    Bacteria, UA 2023 NONE SEEN  NONE SEEN /HPF Final    Hyaline Casts, UA 2023 NONE SEEN  NONE SEEN /LPF Final    Service Cmt: 2023    Final    Reflexive Urine Culture 2023    Final    Creatinine, Urine 2023 172  20 - 320 mg/dL Final    Microalb, Ur 2023 2.2  See Note: mg/dL Final    Microalb/Creat Ratio 2023 13  <30 mcg/mg creat Final    TSH w/reflex to FT4 2023 1.39  0.40 - 4.50 mIU/L Final       Past Medical History:   Diagnosis Date    COVID-19     Gout, unspecified      Social History     Socioeconomic History    Marital status:    Tobacco Use    Smoking status: Former     Current packs/day: 0.00     Average packs/day: 1 pack/day for 15.0 years (15.0 ttl pk-yrs)     Types: Cigarettes     Start date: 1996     Quit date: 2011     Years since quittin.0    Smokeless tobacco: Never   Substance and Sexual Activity    Alcohol use: No    Drug use: No    Sexual activity: Yes     Past Surgical History:   Procedure Laterality Date    COLONOSCOPY  2023    FINGER SURGERY      finger laceration- near amputation    KNEE SURGERY Left     scope    ROTATOR CUFF REPAIR Right     TONSILLECTOMY       Family History   Problem Relation Age of Onset    Cancer Father         Lung Ca       Review of patient's allergies indicates:  No Known Allergies    Current Outpatient Medications:     allopurinoL (ZYLOPRIM) 100 MG tablet, Take 2 tablets (200 mg total) by mouth once daily., Disp: 60 tablet, Rfl: 6    ammonium lactate 12 % Crea,  Aaa qd after shower prn dry skin, Disp: 385 g, Rfl: 11    buPROPion (WELLBUTRIN XL) 150 MG TB24 tablet, Take 1 tablet (150 mg total) by mouth once daily., Disp: 30 tablet, Rfl: 11    clotrimazole-betamethasone 1-0.05% (LOTRISONE) cream, Aaa bid up to 1 wk, tk 1 wk off before repeating prn scaling on ears/nose, Disp: 45 g, Rfl: 3    colchicine (COLCRYS) 0.6 mg tablet, Take 1 tablet (0.6 mg total) by mouth 2 (two) times daily., Disp: 60 tablet, Rfl: 11    fluorouraciL (EFUDEX) 5 % cream, Apply to area/precancer lesions twice daily for up to 2 weeks.  Stop if excess irritation occurs.  May repeat treatment after healed, Disp: 40 g, Rfl: 3    indomethacin (INDOCIN) 50 MG capsule, , Disp: , Rfl:     ketoconazole (NIZORAL) 2 % shampoo, Wash all scaling areas let sit 3 minutes then rinse 3x/wk, Disp: 120 mL, Rfl: 11    losartan (COZAAR) 50 MG tablet, Take 1 tablet (50 mg total) by mouth once daily., Disp: 90 tablet, Rfl: 3    triamcinolone acetonide 0.1% (KENALOG) 0.1 % cream, Aaa bid prn itching/rash on legs, Disp: 80 g, Rfl: 3    busPIRone (BUSPAR) 5 MG Tab, Take 1 tablet (5 mg total) by mouth 3 (three) times daily as needed (anxiety)., Disp: 90 tablet, Rfl: 11    methocarbamoL (ROBAXIN) 500 MG Tab, Take 1 tablet (500 mg total) by mouth nightly as needed (muscle spasm)., Disp: 30 tablet, Rfl: 0    Review of Systems   Constitutional:  Positive for fatigue. Negative for activity change, chills and fever.   HENT:  Negative for nasal congestion, ear pain, rhinorrhea and trouble swallowing.    Eyes:  Negative for pain, discharge and visual disturbance.        Tire easily, works on computer     Respiratory:  Negative for cough, chest tightness, shortness of breath and wheezing.    Cardiovascular:  Negative for chest pain, palpitations, leg swelling and claudication.   Gastrointestinal:  Positive for reflux. Negative for abdominal pain, blood in stool, constipation, diarrhea, nausea and vomiting.   Endocrine: Negative for  "polydipsia, polyphagia and polyuria.   Genitourinary:  Negative for bladder incontinence, difficulty urinating, dysuria, frequency, hematuria and urgency.   Musculoskeletal:  Positive for arthralgias and back pain. Negative for gait problem, joint swelling and myalgias.   Integumentary:  Negative for rash.        Skin ca tx from derm   Neurological:  Positive for headaches. Negative for dizziness, tremors, light-headedness and numbness.   Hematological:  Does not bruise/bleed easily.   Psychiatric/Behavioral:  Positive for sleep disturbance. Negative for dysphoric mood and suicidal ideas. The patient is nervous/anxious.            Objective:      Vitals:    01/31/24 1356   BP: 110/78   Pulse: 67   SpO2: 97%   Weight: 114.9 kg (253 lb 3.2 oz)   Height: 5' 10" (1.778 m)     Physical Exam  Constitutional:       General: He is not in acute distress.     Appearance: Normal appearance. He is well-developed and well-groomed. He is obese. He is not ill-appearing.      Comments: Patient is very anxious appearing today   HENT:      Head: Normocephalic and atraumatic.      Nose: Nose normal.      Mouth/Throat:      Mouth: Mucous membranes are moist.      Pharynx: No posterior oropharyngeal erythema.   Eyes:      General: No scleral icterus.     Pupils: Pupils are equal, round, and reactive to light.   Neck:      Vascular: No carotid bruit.   Cardiovascular:      Rate and Rhythm: Normal rate and regular rhythm.      Pulses: Normal pulses.      Heart sounds: Normal heart sounds. No murmur heard.  Pulmonary:      Effort: Pulmonary effort is normal. No respiratory distress.      Breath sounds: Normal breath sounds.   Abdominal:      Palpations: Abdomen is soft.      Tenderness: There is no abdominal tenderness.   Musculoskeletal:         General: Tenderness present.      Cervical back: Normal range of motion.      Right lower leg: No edema.      Left lower leg: No edema.      Comments: Lower back   Skin:     General: Skin is warm " "and dry.      Capillary Refill: Capillary refill takes less than 2 seconds.      Coloration: Skin is not jaundiced or pale.   Neurological:      Mental Status: He is alert. Mental status is at baseline.      Cranial Nerves: No dysarthria or facial asymmetry.      Motor: No tremor.   Psychiatric:         Attention and Perception: Attention and perception normal.         Mood and Affect: Mood and affect normal.         Speech: Speech normal.         Behavior: Behavior normal. Behavior is cooperative.         Thought Content: Thought content normal.           Assessment:       1. Situational anxiety    2. Blurry vision, bilateral    3. Chronic midline low back pain with left-sided sciatica    4. Left hip pain         Plan:       Situational anxiety  Add Buspar as needed. Patient VU of possible side effects.  -     busPIRone (BUSPAR) 5 MG Tab; Take 1 tablet (5 mg total) by mouth 3 (three) times daily as needed (anxiety).  Dispense: 90 tablet; Refill: 11    Blurry vision, bilateral  Along with eye "twitching." Referral to ophthalmology.   -     Ambulatory referral/consult to Ophthalmology; Future; Expected date: 02/07/2024    Chronic midline low back pain with left-sided sciatica  Xray to evaluate lumbar spine. Robaxin for muscle spasms.   -     X-Ray Lumbar Spine 5 View; Future; Expected date: 01/31/2024  -     methocarbamoL (ROBAXIN) 500 MG Tab; Take 1 tablet (500 mg total) by mouth nightly as needed (muscle spasm).  Dispense: 30 tablet; Refill: 0    Left hip pain  -     X-Ray Hip 2 or 3 views Left (with Pelvis when performed); Future; Expected date: 01/31/2024          Follow up in about 6 weeks (around 3/13/2024).        3/2/2024 Norma Crawford"

## 2024-02-12 ENCOUNTER — OFFICE VISIT (OUTPATIENT)
Dept: PULMONOLOGY | Facility: CLINIC | Age: 54
End: 2024-02-12
Payer: COMMERCIAL

## 2024-02-12 VITALS
DIASTOLIC BLOOD PRESSURE: 80 MMHG | BODY MASS INDEX: 36.6 KG/M2 | HEART RATE: 77 BPM | WEIGHT: 255.13 LBS | SYSTOLIC BLOOD PRESSURE: 130 MMHG | OXYGEN SATURATION: 98 %

## 2024-02-12 DIAGNOSIS — G47.33 OBSTRUCTIVE SLEEP APNEA SYNDROME: Primary | ICD-10-CM

## 2024-02-12 PROCEDURE — 3008F BODY MASS INDEX DOCD: CPT | Mod: CPTII,S$GLB,, | Performed by: INTERNAL MEDICINE

## 2024-02-12 PROCEDURE — 3075F SYST BP GE 130 - 139MM HG: CPT | Mod: CPTII,S$GLB,, | Performed by: INTERNAL MEDICINE

## 2024-02-12 PROCEDURE — 3079F DIAST BP 80-89 MM HG: CPT | Mod: CPTII,S$GLB,, | Performed by: INTERNAL MEDICINE

## 2024-02-12 PROCEDURE — 99203 OFFICE O/P NEW LOW 30 MIN: CPT | Mod: S$GLB,,, | Performed by: INTERNAL MEDICINE

## 2024-02-12 PROCEDURE — 1159F MED LIST DOCD IN RCRD: CPT | Mod: CPTII,S$GLB,, | Performed by: INTERNAL MEDICINE

## 2024-02-12 NOTE — PROGRESS NOTES
New Office Visit/Consultation Note *    Patient Name: Shayne Huerta Jr.  MRN: [id number]  : 1970      Reason for visit: sleep apnea    HPI:     2024 - Referred for evaluation for MIKE, + snoring, + EDS (ESS 13+).  He is overweight.  He works in the [employer].      Past Medical History    Past Medical History:   Diagnosis Date    COVID-19     Gout, unspecified        Past Surgical History    Past Surgical History:   Procedure Laterality Date    COLONOSCOPY  2023    FINGER SURGERY      finger laceration- near amputation    KNEE SURGERY Left     scope    ROTATOR CUFF REPAIR Right     TONSILLECTOMY         Medications      Current Outpatient Medications:     allopurinoL (ZYLOPRIM) 100 MG tablet, Take 2 tablets (200 mg total) by mouth once daily., Disp: 60 tablet, Rfl: 6    ammonium lactate 12 % Crea, Aaa qd after shower prn dry skin, Disp: 385 g, Rfl: 11    buPROPion (WELLBUTRIN XL) 150 MG TB24 tablet, Take 1 tablet (150 mg total) by mouth once daily., Disp: 30 tablet, Rfl: 11    busPIRone (BUSPAR) 5 MG Tab, Take 1 tablet (5 mg total) by mouth 3 (three) times daily as needed (anxiety)., Disp: 90 tablet, Rfl: 11    clotrimazole-betamethasone 1-0.05% (LOTRISONE) cream, Aaa bid up to 1 wk, tk 1 wk off before repeating prn scaling on ears/nose, Disp: 45 g, Rfl: 3    fluorouraciL (EFUDEX) 5 % cream, Apply to area/precancer lesions twice daily for up to 2 weeks.  Stop if excess irritation occurs.  May repeat treatment after healed, Disp: 40 g, Rfl: 3    indomethacin (INDOCIN) 50 MG capsule, , Disp: , Rfl:     ketoconazole (NIZORAL) 2 % shampoo, Wash all scaling areas let sit 3 minutes then rinse 3x/wk, Disp: 120 mL, Rfl: 11    losartan (COZAAR) 50 MG tablet, Take 1 tablet (50 mg total) by mouth once daily., Disp: 90 tablet, Rfl: 3    methocarbamoL (ROBAXIN) 500 MG Tab, Take 1 tablet (500 mg total) by mouth nightly as needed (muscle spasm)., Disp: 30 tablet, Rfl: 0    triamcinolone acetonide 0.1% (KENALOG) 0.1  % cream, Aaa bid prn itching/rash on legs, Disp: 80 g, Rfl: 3    colchicine (COLCRYS) 0.6 mg tablet, Take 1 tablet (0.6 mg total) by mouth 2 (two) times daily., Disp: 60 tablet, Rfl: 11    Allergies    Review of patient's allergies indicates:  No Known Allergies    SocHx    Social History     Tobacco Use   Smoking Status Former    Current packs/day: 0.00    Average packs/day: 1 pack/day for 15.0 years (15.0 ttl pk-yrs)    Types: Cigarettes    Start date: 1996    Quit date: 2011    Years since quittin.0   Smokeless Tobacco Never       Social History     Substance and Sexual Activity   Alcohol Use No       Drug Use - no  Occupation - postal work, was in [place]  Asbestos exposure - no  Pets - na    FMHx    Family History   Problem Relation Age of Onset    Cancer Father         Lung Ca         Review of Systems  Review of Systems   Constitutional:  Negative for chills, diaphoresis, fever, malaise/fatigue and weight loss.        + sleep apnea   HENT:  Negative for congestion.    Eyes:  Negative for pain.   Respiratory:  Negative for cough, hemoptysis, sputum production, shortness of breath, wheezing and stridor.    Cardiovascular:  Negative for chest pain, palpitations, orthopnea, claudication, leg swelling and PND.   Gastrointestinal:  Negative for abdominal pain, constipation, diarrhea, heartburn, nausea and vomiting.   Genitourinary:  Negative for dysuria, frequency and urgency.   Musculoskeletal:  Negative for falls and myalgias.   Neurological:  Negative for sensory change, focal weakness and weakness.   Psychiatric/Behavioral:  Negative for depression and suicidal ideas. The patient is not nervous/anxious.         Some issues with PTSD       Physical Exam    Vitals:    24 0926   BP: 130/80   BP Location: Left arm   Patient Position: Sitting   BP Method: Medium (Manual)   Pulse: 77   SpO2: 98%   Weight: 115.7 kg (255 lb 1.6 oz)       Physical Exam  Vitals and nursing note reviewed.    Constitutional:       General: He is not in acute distress.     Appearance: Normal appearance. He is well-developed. He is not ill-appearing, toxic-appearing or diaphoretic.   HENT:      Head: Normocephalic and atraumatic.      Right Ear: External ear normal.      Left Ear: External ear normal.      Nose: Nose normal.      Mouth/Throat:      Mouth: Mucous membranes are moist.      Pharynx: Oropharynx is clear. No oropharyngeal exudate or posterior oropharyngeal erythema.   Eyes:      General: No scleral icterus.        Right eye: No discharge.         Left eye: No discharge.      Extraocular Movements: Extraocular movements intact.      Conjunctiva/sclera: Conjunctivae normal.      Pupils: Pupils are equal, round, and reactive to light.   Neck:      Thyroid: No thyromegaly.      Vascular: No carotid bruit or JVD.      Trachea: No tracheal deviation.   Cardiovascular:      Rate and Rhythm: Normal rate and regular rhythm.      Heart sounds: Normal heart sounds. No murmur heard.     No friction rub. No gallop.   Pulmonary:      Effort: Pulmonary effort is normal. No respiratory distress.      Breath sounds: Normal breath sounds. No stridor. No wheezing, rhonchi or rales.   Chest:      Chest wall: No tenderness.   Abdominal:      General: Bowel sounds are normal. There is no distension.      Palpations: Abdomen is soft.      Tenderness: There is no abdominal tenderness. There is no guarding.   Musculoskeletal:         General: No tenderness. Normal range of motion.      Cervical back: Normal range of motion and neck supple. No rigidity or tenderness.      Right lower leg: No edema.      Left lower leg: No edema.   Lymphadenopathy:      Cervical: No cervical adenopathy.   Neurological:      General: No focal deficit present.      Mental Status: He is alert and oriented to person, place, and time. Mental status is at baseline.      Cranial Nerves: No cranial nerve deficit.      Motor: No weakness.      Gait: Gait  normal.      Deep Tendon Reflexes: Reflexes are normal and symmetric.   Psychiatric:         Mood and Affect: Mood normal.         Behavior: Behavior normal.         Thought Content: Thought content normal.         Judgment: Judgment normal.         Labs    Lab Results   Component Value Date    WBC 11.7 (H) 12/20/2023    HGB 18.0 (H) 12/20/2023    HCT 52.4 (H) 12/20/2023     12/20/2023       Sodium   Date Value Ref Range Status   12/20/2023 143 135 - 146 mmol/L Final   05/16/2019 142 134 - 144 mmol/L      Potassium   Date Value Ref Range Status   12/20/2023 4.3 3.5 - 5.3 mmol/L Final     Chloride   Date Value Ref Range Status   12/20/2023 103 98 - 110 mmol/L Final   05/16/2019 104 98 - 110 mmol/L      CO2   Date Value Ref Range Status   12/20/2023 30 20 - 32 mmol/L Final     Glucose   Date Value Ref Range Status   12/20/2023 90 65 - 99 mg/dL Final     Comment:                   Fasting reference interval        05/16/2019 99 70 - 99 mg/dL      BUN   Date Value Ref Range Status   12/20/2023 7 7 - 25 mg/dL Final     Creatinine   Date Value Ref Range Status   12/20/2023 1.00 0.70 - 1.30 mg/dL Final   05/16/2019 1.11 0.60 - 1.40 mg/dL      Calcium   Date Value Ref Range Status   12/20/2023 10.2 8.6 - 10.3 mg/dL Final     Total Protein   Date Value Ref Range Status   12/20/2023 7.4 6.1 - 8.1 g/dL Final     Albumin   Date Value Ref Range Status   12/20/2023 5.0 3.6 - 5.1 g/dL Final   02/07/2018 4.4 3.1 - 4.7 g/dL      Total Bilirubin   Date Value Ref Range Status   12/20/2023 0.7 0.2 - 1.2 mg/dL Final     Alkaline Phosphatase   Date Value Ref Range Status   01/23/2023 78 55 - 135 U/L Final     AST   Date Value Ref Range Status   12/20/2023 35 10 - 35 U/L Final     ALT   Date Value Ref Range Status   12/20/2023 88 (H) 9 - 46 U/L Final     Anion Gap   Date Value Ref Range Status   01/24/2023 11 8 - 16 mmol/L Final       Xrays        Impression/Plan    Problem List Items Addressed This Visit          Other     Obstructive sleep apnea syndrome - Primary     Will set up home sleep study and move forward from there  Follow after he gets his treatment         Relevant Orders    Home Sleep Study       I have spent about 30 minutes with the patient taking the history and examining the patient.  We have discussed the diagnoses and current plan and all questions have been answered.  We have discussed the follow up plan.  The patient and family (if present) know to contact the office with any questions they may have.        Abhilash Gastelum MD

## 2024-02-20 ENCOUNTER — PROCEDURE VISIT (OUTPATIENT)
Dept: SLEEP MEDICINE | Facility: HOSPITAL | Age: 54
End: 2024-02-20
Attending: INTERNAL MEDICINE
Payer: COMMERCIAL

## 2024-02-20 DIAGNOSIS — G47.33 OBSTRUCTIVE SLEEP APNEA SYNDROME: ICD-10-CM

## 2024-02-20 PROCEDURE — 95806 SLEEP STUDY UNATT&RESP EFFT: CPT

## 2024-02-21 ENCOUNTER — PATIENT MESSAGE (OUTPATIENT)
Dept: FAMILY MEDICINE | Facility: CLINIC | Age: 54
End: 2024-02-21
Payer: COMMERCIAL

## 2024-02-21 ENCOUNTER — TELEPHONE (OUTPATIENT)
Dept: FAMILY MEDICINE | Facility: CLINIC | Age: 54
End: 2024-02-21
Payer: COMMERCIAL

## 2024-02-21 NOTE — TELEPHONE ENCOUNTER
Spoke with patient informed back to work release form will be up front for . Patient verbalized understanding.

## 2024-02-26 DIAGNOSIS — G47.33 OBSTRUCTIVE SLEEP APNEA SYNDROME: Primary | ICD-10-CM

## 2024-03-03 DIAGNOSIS — F41.8 SITUATIONAL ANXIETY: ICD-10-CM

## 2024-03-03 RX ORDER — BUSPIRONE HYDROCHLORIDE 5 MG/1
5 TABLET ORAL 3 TIMES DAILY PRN
Qty: 90 TABLET | Refills: 11 | Status: CANCELLED | OUTPATIENT
Start: 2024-03-03 | End: 2025-03-03

## 2024-03-04 ENCOUNTER — TELEPHONE (OUTPATIENT)
Dept: FAMILY MEDICINE | Facility: CLINIC | Age: 54
End: 2024-03-04
Payer: COMMERCIAL

## 2024-03-04 NOTE — TELEPHONE ENCOUNTER
Pt is needing a refill on his Buspirone. Last office visit 01/31/2024. Next office visit 03/07/2024.

## 2024-03-04 NOTE — TELEPHONE ENCOUNTER
Spoke with pt in regards to message. Verbalized to the pt that he does have refill on his Buspar at his pharmacy. Pt acknowledged understanding. Pt will call his pharmacy for refills.

## 2024-03-04 NOTE — TELEPHONE ENCOUNTER
----- Message from Beatrice Kennedy sent at 3/4/2024  9:29 AM CST -----  The patient said he missed a call from us about a refill.  Pt's # [phone]

## 2024-03-07 ENCOUNTER — OFFICE VISIT (OUTPATIENT)
Dept: FAMILY MEDICINE | Facility: CLINIC | Age: 54
End: 2024-03-07
Payer: COMMERCIAL

## 2024-03-07 VITALS
SYSTOLIC BLOOD PRESSURE: 132 MMHG | WEIGHT: 252 LBS | OXYGEN SATURATION: 98 % | BODY MASS INDEX: 36.08 KG/M2 | DIASTOLIC BLOOD PRESSURE: 80 MMHG | HEIGHT: 70 IN | HEART RATE: 70 BPM

## 2024-03-07 DIAGNOSIS — M1A.09X0 IDIOPATHIC CHRONIC GOUT OF MULTIPLE SITES WITHOUT TOPHUS: ICD-10-CM

## 2024-03-07 DIAGNOSIS — E66.01 CLASS 2 SEVERE OBESITY DUE TO EXCESS CALORIES WITH SERIOUS COMORBIDITY AND BODY MASS INDEX (BMI) OF 36.0 TO 36.9 IN ADULT: ICD-10-CM

## 2024-03-07 DIAGNOSIS — F41.1 GAD (GENERALIZED ANXIETY DISORDER): ICD-10-CM

## 2024-03-07 DIAGNOSIS — Z00.00 ANNUAL PHYSICAL EXAM: Primary | ICD-10-CM

## 2024-03-07 DIAGNOSIS — I10 PRIMARY HYPERTENSION: ICD-10-CM

## 2024-03-07 DIAGNOSIS — G47.30 SLEEP APNEA, UNSPECIFIED TYPE: ICD-10-CM

## 2024-03-07 PROCEDURE — 99396 PREV VISIT EST AGE 40-64: CPT | Mod: S$GLB,,, | Performed by: NURSE PRACTITIONER

## 2024-03-07 PROCEDURE — 4010F ACE/ARB THERAPY RXD/TAKEN: CPT | Mod: CPTII,S$GLB,, | Performed by: NURSE PRACTITIONER

## 2024-03-07 PROCEDURE — 3008F BODY MASS INDEX DOCD: CPT | Mod: CPTII,S$GLB,, | Performed by: NURSE PRACTITIONER

## 2024-03-07 PROCEDURE — 1159F MED LIST DOCD IN RCRD: CPT | Mod: CPTII,S$GLB,, | Performed by: NURSE PRACTITIONER

## 2024-03-07 PROCEDURE — 3075F SYST BP GE 130 - 139MM HG: CPT | Mod: CPTII,S$GLB,, | Performed by: NURSE PRACTITIONER

## 2024-03-07 PROCEDURE — 3079F DIAST BP 80-89 MM HG: CPT | Mod: CPTII,S$GLB,, | Performed by: NURSE PRACTITIONER

## 2024-03-07 PROCEDURE — 1160F RVW MEDS BY RX/DR IN RCRD: CPT | Mod: CPTII,S$GLB,, | Performed by: NURSE PRACTITIONER

## 2024-03-07 NOTE — PROGRESS NOTES
SUBJECTIVE:    Patient ID: Shayne Huerta Jr. is a 53 y.o. male.    Chief Complaint: Hypertension (No bottles//Pt is here for a check up//ALEJANDRA )    Pt here for HTN/anxiety f/u    Pt seen in Dec by PRINCESS Crawford NP for anxiety- was started on bupropion and then followed up in 1 month- reported hadn't had much improvement so buspirone was added. Pt reports he misunderstood and stopped bupropion and started taking buspirone 5mg once a day. Reports continues with some ongoing anxiety and even near panic attacks at times- reports a lot of his stress is related to excess workload and has tried to reduce the amt of required overtime over the past 6 weeks which has helped some. 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     Manav is working [facility] for [facility] and states is planning to see psych as part of that workup.    Saw Dr. Gastelum and now scheduled for sleep study                    Office Visit on 12/20/2023   Component Date Value Ref Range Status    Uric Acid 12/20/2023 6.3  4.0 - 8.0 mg/dL Final    PROSTATE SPECIFIC ANTIGEN, SCR - Q* 12/20/2023 0.63  < OR = 4.00 ng/mL Final       Past Medical History:   Diagnosis Date    COVID-19     Gout, unspecified      Past Surgical History:   Procedure Laterality Date    COLONOSCOPY  01/2023    FINGER SURGERY      finger laceration- near amputation    FRACTURE SURGERY  1994    Left middle finger put back on.    KNEE SURGERY Left     scope    ROTATOR CUFF REPAIR Right     TONSILLECTOMY      VASECTOMY  2001     Family History   Problem Relation Age of Onset    Cancer Father         Lung Ca    Alcohol abuse Father     Early death Father        All of your core healthy metrics are met.      The 10-year CVD risk score (D'Agostino, et al., 2008) is: 16.9%    Values used to calculate the score:      Age: 53 years      Sex: Male       Diabetic: No      Tobacco smoker: No      Systolic Blood Pressure: 132 mmHg      Is BP treated: Yes      HDL Cholesterol: 41 mg/dL      Total Cholesterol: 217 mg/dL     Marital Status:   Alcohol History:  reports that he does not currently use alcohol.  Tobacco History:  reports that he quit smoking about 13 years ago. His smoking use included cigarettes. He started smoking about 28 years ago. He has a 15.0 pack-year smoking history. He has never used smokeless tobacco.  Drug History:  reports no history of drug use.    Health Maintenance Topics with due status: Not Due       Topic Last Completion Date    Colorectal Cancer Screening 01/06/2023    Lipid Panel 12/20/2023     Immunization History   Administered Date(s) Administered    Hepatitis A / Hepatitis B 08/18/2016, 02/21/2017    Influenza 01/11/2012, 01/07/2013    Td (Adult), Unspecified Formulation 11/20/2006    Tdap 05/01/2008       Review of patient's allergies indicates:  No Known Allergies    Current Outpatient Medications:     allopurinoL (ZYLOPRIM) 100 MG tablet, Take 2 tablets (200 mg total) by mouth once daily., Disp: 60 tablet, Rfl: 6    busPIRone (BUSPAR) 5 MG Tab, Take 1 tablet (5 mg total) by mouth 3 (three) times daily as needed (anxiety)., Disp: 90 tablet, Rfl: 11    losartan (COZAAR) 50 MG tablet, Take 1 tablet (50 mg total) by mouth once daily., Disp: 90 tablet, Rfl: 3    ammonium lactate 12 % Crea, Aaa qd after shower prn dry skin, Disp: 385 g, Rfl: 11    buPROPion (WELLBUTRIN XL) 150 MG TB24 tablet, Take 1 tablet (150 mg total) by mouth once daily. (Patient not taking: Reported on 3/7/2024), Disp: 30 tablet, Rfl: 11    clotrimazole-betamethasone 1-0.05% (LOTRISONE) cream, Aaa bid up to 1 wk, tk 1 wk off before repeating prn scaling on ears/nose, Disp: 45 g, Rfl: 3    colchicine (COLCRYS) 0.6 mg tablet, Take 1 tablet (0.6 mg total) by mouth 2 (two) times daily., Disp: 60 tablet, Rfl: 11    fluorouraciL (EFUDEX) 5 % cream, Apply to  "area/precancer lesions twice daily for up to 2 weeks.  Stop if excess irritation occurs.  May repeat treatment after healed, Disp: 40 g, Rfl: 3    indomethacin (INDOCIN) 50 MG capsule, , Disp: , Rfl:     ketoconazole (NIZORAL) 2 % shampoo, Wash all scaling areas let sit 3 minutes then rinse 3x/wk, Disp: 120 mL, Rfl: 11    methocarbamoL (ROBAXIN) 500 MG Tab, Take 1 tablet (500 mg total) by mouth nightly as needed (muscle spasm)., Disp: 30 tablet, Rfl: 0    triamcinolone acetonide 0.1% (KENALOG) 0.1 % cream, Aaa bid prn itching/rash on legs, Disp: 80 g, Rfl: 3    Review of Systems   Constitutional:  Negative for appetite change, chills, fever and unexpected weight change.   HENT:  Negative for sore throat and trouble swallowing.    Respiratory:  Negative for cough, shortness of breath and wheezing.    Cardiovascular:  Negative for chest pain, palpitations and leg swelling.   Gastrointestinal:  Negative for abdominal pain, constipation and diarrhea.   Genitourinary:  Negative for dysuria, frequency and hematuria.   Musculoskeletal:  Positive for arthralgias (reports diffuse foot pain but no joint pain/swelling/erythema like previous gout attacks. left hip pain when he first stands) and back pain (lower back pain, taking muscle relaxer prn which helps). Negative for gait problem and joint swelling.   Skin:  Negative for rash.   Neurological:  Negative for dizziness, syncope, numbness and headaches.   Psychiatric/Behavioral:  Positive for sleep disturbance. Negative for dysphoric mood, self-injury and suicidal ideas. The patient is nervous/anxious.           Objective:      Vitals:    03/07/24 1602   BP: 132/80   Pulse: 70   SpO2: 98%   Weight: 114.3 kg (252 lb)   Height: 5' 10" (1.778 m)     Physical Exam  Vitals reviewed.   Constitutional:       General: He is not in acute distress.     Appearance: He is well-developed. He is obese.   HENT:      Head: Normocephalic and atraumatic.      Right Ear: Tympanic membrane and " ear canal normal.      Left Ear: Tympanic membrane and ear canal normal.   Neck:      Vascular: No carotid bruit.   Cardiovascular:      Rate and Rhythm: Normal rate and regular rhythm.      Heart sounds: No murmur heard.  Pulmonary:      Effort: Pulmonary effort is normal. No respiratory distress.      Breath sounds: Normal breath sounds. No wheezing or rales.   Abdominal:      General: There is no distension.      Palpations: Abdomen is soft.      Tenderness: There is no abdominal tenderness.   Musculoskeletal:      Right wrist: No swelling or deformity.      Cervical back: Neck supple.      Right lower leg: No edema.      Left lower leg: No edema.   Lymphadenopathy:      Cervical: No cervical adenopathy.   Skin:     General: Skin is warm and dry.      Findings: No rash.   Neurological:      General: No focal deficit present.      Mental Status: He is alert and oriented to person, place, and time.      Gait: Gait normal.   Psychiatric:         Mood and Affect: Mood normal.           Assessment:       1. Annual physical exam    2. Primary hypertension    3. OMAIRA (generalized anxiety disorder)    4. Sleep apnea, unspecified type    5. Idiopathic chronic gout of multiple sites without tophus    6. Class 2 severe obesity due to excess calories with serious comorbidity and body mass index (BMI) of 36.0 to 36.9 in adult           Plan:       1. Annual physical exam    2. Primary hypertension   -BP well controlled. Reviewed dec labs with elevated  and . Encouraged healthy diet and regular exercise    3. OMAIRA (generalized anxiety disorder)   -admits to ongoing anxiety and near panic a couple times. Only taking buspirone once a day. Discussed adding paroxetine but he would like to try to increase buspirone first, advised to increase to 5mg BID and if no improvement can either increase to TID or 10mg BID/TID. Cautioned to call for any worsening in symptoms. Discussed therapy which he is open to, wants to discuss  [facility] when [facility]    4. Sleep apnea, unspecified type   -scheduled for in lab sleep study    5. Idiopathic chronic gout of multiple sites without tophus   -stable recently, most recent uric acid=6.3    6. Class 2 severe obesity due to excess calories   -discussed imp of healthy diet and encouraged him to cut out processed/fast food and increase whole foods- protein, vegetables and fiber. Also encouraged regular exercise    Follow up in about 3 months (around 6/7/2024) for HTN, anxiety.          Counseled on age and gender appropriate medical preventative services, including cancer screenings, immunizations, overall nutritional health, need for a consistent exercise regimen and an overall push towards maintaining a vigorous and active lifestyle.      3/10/2024 Becky Brown NP

## 2024-03-07 NOTE — PATIENT INSTRUCTIONS
Increase buspirone to 5mg twice a day for anxiety- if no improvement in 1-2 weeks may increase to 10mg twice a day

## 2024-03-07 NOTE — LETTER
[address] [facility] [address]  [place] [address]  Phone: [phone]   [phone]                        MD Promise Parikh MD Chequita Williams, MD Matthew Bassett, PA-C Linda Melerine, NP Jodi Powell, NP Jennifer Shields, NP      Date: 03/07/2024        Patient: Shayne Huerta Jr.  YOB: 1970    Anastacio Huerta  was at [facility] on 3/7/2024. The patient may return to work/school on 3/8/2024 with continued restrictions. He is restricted to working 40 hours or less for the next 6-8 weeks, possibly longer, to be reevaluated at that time. If you have any questions or concerns, or if I can be of further assistance, please do not hesitate to contact me.        Sincerely,      Becky Brown NP

## 2024-03-19 ENCOUNTER — PATIENT MESSAGE (OUTPATIENT)
Dept: FAMILY MEDICINE | Facility: CLINIC | Age: 54
End: 2024-03-19
Payer: COMMERCIAL

## 2024-03-25 ENCOUNTER — PROCEDURE VISIT (OUTPATIENT)
Dept: SLEEP MEDICINE | Facility: HOSPITAL | Age: 54
End: 2024-03-25
Attending: INTERNAL MEDICINE
Payer: COMMERCIAL

## 2024-03-25 DIAGNOSIS — G47.33 OBSTRUCTIVE SLEEP APNEA SYNDROME: ICD-10-CM

## 2024-03-25 PROCEDURE — 95811 POLYSOM 6/>YRS CPAP 4/> PARM: CPT

## 2024-04-07 DIAGNOSIS — G47.33 OBSTRUCTIVE SLEEP APNEA SYNDROME: Primary | ICD-10-CM

## 2024-06-03 ENCOUNTER — PATIENT MESSAGE (OUTPATIENT)
Dept: PULMONOLOGY | Facility: CLINIC | Age: 54
End: 2024-06-03

## 2024-06-03 ENCOUNTER — OFFICE VISIT (OUTPATIENT)
Dept: OPHTHALMOLOGY | Facility: CLINIC | Age: 54
End: 2024-06-03
Payer: COMMERCIAL

## 2024-06-03 DIAGNOSIS — H52.03 HYPEROPIA OF BOTH EYES WITH ASTIGMATISM AND PRESBYOPIA: Primary | ICD-10-CM

## 2024-06-03 DIAGNOSIS — H26.9 CORTICAL CATARACT OF BOTH EYES: ICD-10-CM

## 2024-06-03 DIAGNOSIS — H52.4 HYPEROPIA OF BOTH EYES WITH ASTIGMATISM AND PRESBYOPIA: Primary | ICD-10-CM

## 2024-06-03 DIAGNOSIS — H52.203 HYPEROPIA OF BOTH EYES WITH ASTIGMATISM AND PRESBYOPIA: Primary | ICD-10-CM

## 2024-06-03 PROCEDURE — 92015 DETERMINE REFRACTIVE STATE: CPT | Mod: S$GLB,,, | Performed by: OPHTHALMOLOGY

## 2024-06-03 PROCEDURE — 99999 PR PBB SHADOW E&M-EST. PATIENT-LVL III: CPT | Mod: PBBFAC,,, | Performed by: OPHTHALMOLOGY

## 2024-06-03 PROCEDURE — 1159F MED LIST DOCD IN RCRD: CPT | Mod: CPTII,S$GLB,, | Performed by: OPHTHALMOLOGY

## 2024-06-03 PROCEDURE — 4010F ACE/ARB THERAPY RXD/TAKEN: CPT | Mod: CPTII,S$GLB,, | Performed by: OPHTHALMOLOGY

## 2024-06-03 PROCEDURE — 1160F RVW MEDS BY RX/DR IN RCRD: CPT | Mod: CPTII,S$GLB,, | Performed by: OPHTHALMOLOGY

## 2024-06-03 PROCEDURE — 92004 COMPRE OPH EXAM NEW PT 1/>: CPT | Mod: S$GLB,,, | Performed by: OPHTHALMOLOGY

## 2024-06-03 NOTE — PROGRESS NOTES
HPI    DLS: 11/11/2020- annual exam     Pt states he has been having more trouble with vison than last visit.   States sometimes blurry distance as well as near. States using readers   more often. Using them at the computer and near. Working alright.     States light sensitive. Sunlight and headlights.     Trouble seeing dash when riding motorcycle. Hard to focus.     No gtts.     Denies pain, except on a Friday. Eyes need to adjust some and will get   headache.     FOL and floaters OD sometimes.   Last edited by Benita Cowan on 6/3/2024  9:02 AM.            Assessment /Plan     For exam results, see Encounter Report.    Hyperopia of both eyes with astigmatism and presbyopia    Cortical cataract of both eyes  -     Ambulatory referral/consult to Ophthalmology      1. Cortical cataract of both eyes  Mild, sectoral, non-central  Observe  - Ambulatory referral/consult to Ophthalmology    2. Hyperopia of both eyes with astigmatism and presbyopia  New glasses Rx today    F/u PRN with me  Recommend optometry yearly

## 2024-06-25 ENCOUNTER — OFFICE VISIT (OUTPATIENT)
Dept: FAMILY MEDICINE | Facility: CLINIC | Age: 54
End: 2024-06-25
Payer: COMMERCIAL

## 2024-06-25 VITALS
BODY MASS INDEX: 37.42 KG/M2 | OXYGEN SATURATION: 98 % | HEART RATE: 90 BPM | DIASTOLIC BLOOD PRESSURE: 68 MMHG | WEIGHT: 261.38 LBS | SYSTOLIC BLOOD PRESSURE: 138 MMHG | HEIGHT: 70 IN

## 2024-06-25 DIAGNOSIS — I10 PRIMARY HYPERTENSION: ICD-10-CM

## 2024-06-25 DIAGNOSIS — Z12.5 PROSTATE CANCER SCREENING: ICD-10-CM

## 2024-06-25 DIAGNOSIS — F41.1 GAD (GENERALIZED ANXIETY DISORDER): Primary | ICD-10-CM

## 2024-06-25 DIAGNOSIS — G47.33 OSA ON CPAP: ICD-10-CM

## 2024-06-25 DIAGNOSIS — M1A.09X0 IDIOPATHIC CHRONIC GOUT OF MULTIPLE SITES WITHOUT TOPHUS: ICD-10-CM

## 2024-06-25 DIAGNOSIS — E66.01 CLASS 2 SEVERE OBESITY DUE TO EXCESS CALORIES WITH SERIOUS COMORBIDITY AND BODY MASS INDEX (BMI) OF 37.0 TO 37.9 IN ADULT: ICD-10-CM

## 2024-06-25 PROCEDURE — 3078F DIAST BP <80 MM HG: CPT | Mod: CPTII,S$GLB,, | Performed by: NURSE PRACTITIONER

## 2024-06-25 PROCEDURE — 4010F ACE/ARB THERAPY RXD/TAKEN: CPT | Mod: CPTII,S$GLB,, | Performed by: NURSE PRACTITIONER

## 2024-06-25 PROCEDURE — 3075F SYST BP GE 130 - 139MM HG: CPT | Mod: CPTII,S$GLB,, | Performed by: NURSE PRACTITIONER

## 2024-06-25 PROCEDURE — 1159F MED LIST DOCD IN RCRD: CPT | Mod: CPTII,S$GLB,, | Performed by: NURSE PRACTITIONER

## 2024-06-25 PROCEDURE — 99214 OFFICE O/P EST MOD 30 MIN: CPT | Mod: S$GLB,,, | Performed by: NURSE PRACTITIONER

## 2024-06-25 PROCEDURE — 1160F RVW MEDS BY RX/DR IN RCRD: CPT | Mod: CPTII,S$GLB,, | Performed by: NURSE PRACTITIONER

## 2024-06-25 PROCEDURE — 3008F BODY MASS INDEX DOCD: CPT | Mod: CPTII,S$GLB,, | Performed by: NURSE PRACTITIONER

## 2024-06-25 RX ORDER — ALLOPURINOL 100 MG/1
200 TABLET ORAL DAILY
Qty: 180 TABLET | Refills: 3 | Status: SHIPPED | OUTPATIENT
Start: 2024-06-25

## 2024-06-25 RX ORDER — LOSARTAN POTASSIUM 50 MG/1
50 TABLET ORAL DAILY
Qty: 90 TABLET | Refills: 3 | Status: SHIPPED | OUTPATIENT
Start: 2024-06-25 | End: 2025-06-25

## 2024-06-25 NOTE — PROGRESS NOTES
"  SUBJECTIVE:    Patient ID: Shayne Huerta Jr. is a 53 y.o. male.    Chief Complaint: Follow-up (No bottles//pt is here for check up//ALEJANDRA )    Pt here for HTN/anxiety f/u    Pt reports overall doing okay since last visit.    At last visit we increased buspirone to TID d/t ongoing anxiety c/o's. Reports he didn't like taking it TID but has been taking BID and thinks he's doing "okay". Reports has been [facility] and had first zoom interview with psychologist last week for PTSD. Denies any recent panic attacks though tries to avoid large stores such as [employer] and [employer].    Had sleep study since last visit- got CPAP machine about 3 weeks ago and getting used to it. Has noticed improvement in sleep quality.                        No visits with results within 6 Month(s) from this visit.   Latest known visit with results is:   Office Visit on 12/20/2023   Component Date Value Ref Range Status    Uric Acid 12/20/2023 6.3  4.0 - 8.0 mg/dL Final    PROSTATE SPECIFIC ANTIGEN, SCR - Q* 12/20/2023 0.63  < OR = 4.00 ng/mL Final       Past Medical History:   Diagnosis Date    COVID-19     Gout, unspecified      Past Surgical History:   Procedure Laterality Date    COLONOSCOPY  01/2023    FINGER SURGERY      finger laceration- near amputation    FRACTURE SURGERY  1994    Left middle finger put back on.    KNEE SURGERY Left     scope    ROTATOR CUFF REPAIR Right     TONSILLECTOMY      VASECTOMY  2001     Family History   Problem Relation Name Age of Onset    Cancer Father Shayne huerta         Lung Ca    Alcohol abuse Father Shayne huerta     Early death Father Shayne huerta        All of your core healthy metrics are met.      The 10-year CVD risk score (ELIANA'Agoino, et al., 2008) is: 18.3%    Values used to calculate the score:      Age: 53 years      Sex: Male      Diabetic: No      Tobacco smoker: No      Systolic Blood Pressure: 138 mmHg      Is BP treated: Yes      HDL Cholesterol: 41 mg/dL      " Total Cholesterol: 217 mg/dL     Marital Status:   Alcohol History:  reports that he does not currently use alcohol.  Tobacco History:  reports that he quit smoking about 13 years ago. His smoking use included cigarettes. He started smoking about 28 years ago. He has a 15 pack-year smoking history. He has never used smokeless tobacco.  Drug History:  reports no history of drug use.    Health Maintenance Topics with due status: Not Due       Topic Last Completion Date    Influenza Vaccine 01/07/2013    Colorectal Cancer Screening 01/06/2023    Lipid Panel 12/20/2023     Immunization History   Administered Date(s) Administered    Hepatitis A / Hepatitis B 08/18/2016, 02/21/2017    Influenza 01/11/2012, 01/07/2013    Td (Adult), Unspecified Formulation 11/20/2006    Tdap 05/01/2008       Review of patient's allergies indicates:  No Known Allergies    Current Outpatient Medications:     allopurinoL (ZYLOPRIM) 100 MG tablet, Take 2 tablets (200 mg total) by mouth once daily., Disp: 180 tablet, Rfl: 3    ammonium lactate 12 % Crea, Aaa qd after shower prn dry skin, Disp: 385 g, Rfl: 11    busPIRone (BUSPAR) 5 MG Tab, Take 1 tablet (5 mg total) by mouth 3 (three) times daily as needed (anxiety)., Disp: 90 tablet, Rfl: 11    clotrimazole-betamethasone 1-0.05% (LOTRISONE) cream, Aaa bid up to 1 wk, tk 1 wk off before repeating prn scaling on ears/nose, Disp: 45 g, Rfl: 3    colchicine (COLCRYS) 0.6 mg tablet, Take 1 tablet (0.6 mg total) by mouth 2 (two) times daily., Disp: 60 tablet, Rfl: 11    fluorouraciL (EFUDEX) 5 % cream, Apply to area/precancer lesions twice daily for up to 2 weeks.  Stop if excess irritation occurs.  May repeat treatment after healed, Disp: 40 g, Rfl: 3    indomethacin (INDOCIN) 50 MG capsule, , Disp: , Rfl:     ketoconazole (NIZORAL) 2 % shampoo, Wash all scaling areas let sit 3 minutes then rinse 3x/wk, Disp: 120 mL, Rfl: 11    losartan (COZAAR) 50 MG tablet, Take 1 tablet (50 mg total) by  "mouth once daily., Disp: 90 tablet, Rfl: 3    methocarbamoL (ROBAXIN) 500 MG Tab, Take 1 tablet (500 mg total) by mouth nightly as needed (muscle spasm)., Disp: 30 tablet, Rfl: 0    triamcinolone acetonide 0.1% (KENALOG) 0.1 % cream, Aaa bid prn itching/rash on legs, Disp: 80 g, Rfl: 3    Review of Systems   Constitutional:  Positive for unexpected weight change (wt up 9lbs since last visit). Negative for appetite change, chills and fever.   HENT:  Negative for sore throat and trouble swallowing.    Respiratory:  Negative for cough, shortness of breath and wheezing.    Cardiovascular:  Negative for chest pain, palpitations and leg swelling.   Gastrointestinal:  Negative for abdominal pain, constipation and diarrhea.   Genitourinary:  Negative for dysuria, frequency and hematuria.   Musculoskeletal:  Positive for back pain (occasional lower back pain). Negative for arthralgias (denies any recent gout attacks- has f/u with rheum next month), gait problem and joint swelling.   Skin:  Negative for rash.   Neurological:  Negative for dizziness, syncope, numbness and headaches.   Psychiatric/Behavioral:  Negative for dysphoric mood, self-injury, sleep disturbance (starting to feel better with CPAP) and suicidal ideas. The patient is nervous/anxious (feels he's doing "okay" on buspirone BID).           Objective:      Vitals:    06/25/24 1622   BP: 138/68   Pulse: 90   SpO2: 98%   Weight: 118.6 kg (261 lb 6.4 oz)   Height: 5' 10" (1.778 m)     Physical Exam  Vitals reviewed.   Constitutional:       General: He is not in acute distress.     Appearance: He is well-developed. He is obese.   HENT:      Head: Normocephalic and atraumatic.      Right Ear: Tympanic membrane and ear canal normal.      Left Ear: Tympanic membrane and ear canal normal.   Neck:      Vascular: No carotid bruit.   Cardiovascular:      Rate and Rhythm: Normal rate and regular rhythm.      Heart sounds: No murmur heard.  Pulmonary:      Effort: Pulmonary " effort is normal. No respiratory distress.      Breath sounds: Normal breath sounds. No wheezing or rales.   Abdominal:      General: There is no distension.      Palpations: Abdomen is soft.      Tenderness: There is no abdominal tenderness.   Musculoskeletal:      Right wrist: No swelling or deformity.      Cervical back: Neck supple.      Right lower leg: No edema.      Left lower leg: No edema.   Lymphadenopathy:      Cervical: No cervical adenopathy.   Skin:     General: Skin is warm and dry.      Findings: No rash.   Neurological:      General: No focal deficit present.      Mental Status: He is alert and oriented to person, place, and time.      Gait: Gait normal.   Psychiatric:         Mood and Affect: Mood normal.           Assessment:       1. OMAIRA (generalized anxiety disorder)    2. Primary hypertension    3. MIKE on CPAP    4. Idiopathic chronic gout of multiple sites without tophus    5. Prostate cancer screening    6. Class 2 severe obesity due to excess calories with serious comorbidity and body mass index (BMI) of 37.0 to 37.9 in adult           Plan:       1. OMAIRA (generalized anxiety disorder)   -reports stable taking buspirone b.i.d.    2. Primary hypertension  -BP well controlled  -     losartan (COZAAR) 50 MG tablet; Take 1 tablet (50 mg total) by mouth once daily.  Dispense: 90 tablet; Refill: 3  -     CBC Auto Differential; Future; Expected date: 06/25/2024  -     Comprehensive Metabolic Panel; Future; Expected date: 06/25/2024  -     Lipid Panel; Future; Expected date: 06/25/2024  -     Urinalysis, Reflex to Urine Culture Urine, Clean Catch; Future  -     TSH w/reflex to FT4; Future; Expected date: 06/25/2024  -     Microalbumin/Creatinine Ratio, Urine; Future    3. MIKE on CPAP   -recently started wearing CPAP with improvement in sleep quality    4. Idiopathic chronic gout of multiple sites without tophus  -stable, has follow-up with Rheumatology next month  -     allopurinoL (ZYLOPRIM) 100 MG  tablet; Take 2 tablets (200 mg total) by mouth once daily.  Dispense: 180 tablet; Refill: 3  -     Uric Acid; Future; Expected date: 06/25/2024    5. Prostate cancer screening  -     PSA, Screening; Future; Expected date: 06/25/2024    6. Class 2 severe obesity due to excess calories with serious comorbidity and body mass index BMI of 37.0-37.9 in adult   -reviewed patient's weight gain and discussed importance of healthy diet.  Recommend cutting out processed sugars and carbs, increase protein and fiber intake.  Also recommend regular exercise such as walking daily    Follow up in about 6 months (around 12/25/2024) for Labs before next visit.          Counseled on age and gender appropriate medical preventative services, including cancer screenings, immunizations, overall nutritional health, need for a consistent exercise regimen and an overall push towards maintaining a vigorous and active lifestyle.      6/25/2024 Becky Brown NP

## 2024-07-18 ENCOUNTER — TELEPHONE (OUTPATIENT)
Dept: PULMONOLOGY | Facility: CLINIC | Age: 54
End: 2024-07-18
Payer: COMMERCIAL

## 2024-09-26 ENCOUNTER — OFFICE VISIT (OUTPATIENT)
Dept: PULMONOLOGY | Facility: CLINIC | Age: 54
End: 2024-09-26
Payer: COMMERCIAL

## 2024-09-26 VITALS
OXYGEN SATURATION: 98 % | SYSTOLIC BLOOD PRESSURE: 140 MMHG | WEIGHT: 261.81 LBS | DIASTOLIC BLOOD PRESSURE: 82 MMHG | HEART RATE: 64 BPM | BODY MASS INDEX: 37.56 KG/M2

## 2024-09-26 DIAGNOSIS — G47.33 OBSTRUCTIVE SLEEP APNEA SYNDROME: Primary | ICD-10-CM

## 2024-09-26 NOTE — ASSESSMENT & PLAN NOTE
Continue present PAP therapy  Pt to call if they have any trouble with their machines  Discussed with pt about signs and symptoms to watch for  Will refill supplies as needed  Have encouraged pt to continue to work on diet, weight loss and exercise  RTC 6 months

## 2024-11-07 ENCOUNTER — HOSPITAL ENCOUNTER (EMERGENCY)
Facility: HOSPITAL | Age: 54
Discharge: HOME | End: 2024-11-07
Attending: EMERGENCY MEDICINE
Payer: COMMERCIAL

## 2024-11-07 VITALS
OXYGEN SATURATION: 95 % | BODY MASS INDEX: 37.22 KG/M2 | SYSTOLIC BLOOD PRESSURE: 127 MMHG | HEIGHT: 70 IN | WEIGHT: 260 LBS | DIASTOLIC BLOOD PRESSURE: 71 MMHG | HEART RATE: 59 BPM | TEMPERATURE: 98 F | RESPIRATION RATE: 18 BRPM

## 2024-11-07 DIAGNOSIS — L25.9 CONTACT DERMATITIS, UNSPECIFIED CONTACT DERMATITIS TYPE, UNSPECIFIED TRIGGER: Primary | ICD-10-CM

## 2024-11-07 DIAGNOSIS — M79.89 LEG SWELLING: ICD-10-CM

## 2024-11-07 LAB
ALBUMIN SERPL BCP-MCNC: 4.6 G/DL (ref 3.5–5.2)
ALP SERPL-CCNC: 68 U/L (ref 55–135)
ALT SERPL W/O P-5'-P-CCNC: 61 U/L (ref 10–44)
ANION GAP SERPL CALC-SCNC: 10 MMOL/L (ref 8–16)
AST SERPL-CCNC: 27 U/L (ref 10–40)
BASOPHILS # BLD AUTO: 0.06 K/UL (ref 0–0.2)
BASOPHILS NFR BLD: 0.6 % (ref 0–1.9)
BILIRUB SERPL-MCNC: 0.4 MG/DL (ref 0.1–1)
BILIRUB UR QL STRIP: NEGATIVE
BUN SERPL-MCNC: 16 MG/DL (ref 6–20)
CALCIUM SERPL-MCNC: 9.5 MG/DL (ref 8.7–10.5)
CHLORIDE SERPL-SCNC: 101 MMOL/L (ref 95–110)
CLARITY UR: CLEAR
CO2 SERPL-SCNC: 29 MMOL/L (ref 23–29)
COLOR UR: YELLOW
CREAT SERPL-MCNC: 1.1 MG/DL (ref 0.5–1.4)
CRP SERPL-MCNC: 0.3 MG/DL
DIFFERENTIAL METHOD BLD: ABNORMAL
EOSINOPHIL # BLD AUTO: 0.8 K/UL (ref 0–0.5)
EOSINOPHIL NFR BLD: 8.1 % (ref 0–8)
ERYTHROCYTE [DISTWIDTH] IN BLOOD BY AUTOMATED COUNT: 13.5 % (ref 11.5–14.5)
EST. GFR  (NO RACE VARIABLE): >60 ML/MIN/1.73 M^2
GLUCOSE SERPL-MCNC: 115 MG/DL (ref 70–110)
GLUCOSE UR QL STRIP: NEGATIVE
HCT VFR BLD AUTO: 45.5 % (ref 40–54)
HGB BLD-MCNC: 15.6 G/DL (ref 14–18)
HGB UR QL STRIP: NEGATIVE
IMM GRANULOCYTES # BLD AUTO: 0.02 K/UL (ref 0–0.04)
IMM GRANULOCYTES NFR BLD AUTO: 0.2 % (ref 0–0.5)
KETONES UR QL STRIP: NEGATIVE
LACTATE SERPL-SCNC: 1.2 MMOL/L (ref 0.5–1.9)
LEUKOCYTE ESTERASE UR QL STRIP: NEGATIVE
LYMPHOCYTES # BLD AUTO: 3.1 K/UL (ref 1–4.8)
LYMPHOCYTES NFR BLD: 30.9 % (ref 18–48)
MCH RBC QN AUTO: 28.8 PG (ref 27–31)
MCHC RBC AUTO-ENTMCNC: 34.3 G/DL (ref 32–36)
MCV RBC AUTO: 84 FL (ref 82–98)
MONOCYTES # BLD AUTO: 0.9 K/UL (ref 0.3–1)
MONOCYTES NFR BLD: 8.7 % (ref 4–15)
NEUTROPHILS # BLD AUTO: 5.1 K/UL (ref 1.8–7.7)
NEUTROPHILS NFR BLD: 51.5 % (ref 38–73)
NITRITE UR QL STRIP: NEGATIVE
NRBC BLD-RTO: 0 /100 WBC
PH UR STRIP: 7 [PH] (ref 5–8)
PLATELET # BLD AUTO: 248 K/UL (ref 150–450)
PMV BLD AUTO: 11 FL (ref 9.2–12.9)
POCT GLUCOSE: 114 MG/DL (ref 70–110)
POTASSIUM SERPL-SCNC: 3.7 MMOL/L (ref 3.5–5.1)
PROT SERPL-MCNC: 6.8 G/DL (ref 6–8.4)
PROT UR QL STRIP: NEGATIVE
RBC # BLD AUTO: 5.41 M/UL (ref 4.6–6.2)
SODIUM SERPL-SCNC: 140 MMOL/L (ref 136–145)
SP GR UR STRIP: 1.02 (ref 1–1.03)
URN SPEC COLLECT METH UR: NORMAL
UROBILINOGEN UR STRIP-ACNC: NEGATIVE EU/DL
WBC # BLD AUTO: 9.94 K/UL (ref 3.9–12.7)

## 2024-11-07 PROCEDURE — 82962 GLUCOSE BLOOD TEST: CPT

## 2024-11-07 PROCEDURE — 93010 ELECTROCARDIOGRAM REPORT: CPT | Mod: ,,, | Performed by: INTERNAL MEDICINE

## 2024-11-07 PROCEDURE — 63600175 PHARM REV CODE 636 W HCPCS: Performed by: EMERGENCY MEDICINE

## 2024-11-07 PROCEDURE — 80053 COMPREHEN METABOLIC PANEL: CPT | Performed by: NURSE PRACTITIONER

## 2024-11-07 PROCEDURE — 83605 ASSAY OF LACTIC ACID: CPT | Performed by: NURSE PRACTITIONER

## 2024-11-07 PROCEDURE — 36415 COLL VENOUS BLD VENIPUNCTURE: CPT | Performed by: NURSE PRACTITIONER

## 2024-11-07 PROCEDURE — 81003 URINALYSIS AUTO W/O SCOPE: CPT | Performed by: NURSE PRACTITIONER

## 2024-11-07 PROCEDURE — 99285 EMERGENCY DEPT VISIT HI MDM: CPT | Mod: 25

## 2024-11-07 PROCEDURE — 93005 ELECTROCARDIOGRAM TRACING: CPT | Performed by: INTERNAL MEDICINE

## 2024-11-07 PROCEDURE — 85025 COMPLETE CBC W/AUTO DIFF WBC: CPT | Performed by: NURSE PRACTITIONER

## 2024-11-07 PROCEDURE — 96372 THER/PROPH/DIAG INJ SC/IM: CPT | Performed by: EMERGENCY MEDICINE

## 2024-11-07 PROCEDURE — 25000003 PHARM REV CODE 250: Performed by: EMERGENCY MEDICINE

## 2024-11-07 PROCEDURE — 87040 BLOOD CULTURE FOR BACTERIA: CPT | Mod: 59 | Performed by: NURSE PRACTITIONER

## 2024-11-07 PROCEDURE — 86140 C-REACTIVE PROTEIN: CPT | Performed by: NURSE PRACTITIONER

## 2024-11-07 RX ORDER — CEPHALEXIN 250 MG/1
500 CAPSULE ORAL
Status: COMPLETED | OUTPATIENT
Start: 2024-11-07 | End: 2024-11-07

## 2024-11-07 RX ORDER — CEPHALEXIN 500 MG/1
500 CAPSULE ORAL 4 TIMES DAILY
Qty: 20 CAPSULE | Refills: 0 | Status: SHIPPED | OUTPATIENT
Start: 2024-11-07 | End: 2024-11-12

## 2024-11-07 RX ORDER — FAMOTIDINE 20 MG/1
20 TABLET, FILM COATED ORAL
Status: COMPLETED | OUTPATIENT
Start: 2024-11-07 | End: 2024-11-07

## 2024-11-07 RX ORDER — FAMOTIDINE 20 MG/1
20 TABLET, FILM COATED ORAL 2 TIMES DAILY
Qty: 20 TABLET | Refills: 0 | Status: SHIPPED | OUTPATIENT
Start: 2024-11-07 | End: 2025-11-07

## 2024-11-07 RX ORDER — DEXAMETHASONE SODIUM PHOSPHATE 4 MG/ML
8 INJECTION, SOLUTION INTRA-ARTICULAR; INTRALESIONAL; INTRAMUSCULAR; INTRAVENOUS; SOFT TISSUE
Status: COMPLETED | OUTPATIENT
Start: 2024-11-07 | End: 2024-11-07

## 2024-11-07 RX ORDER — DIPHENHYDRAMINE HCL 25 MG
25 CAPSULE ORAL
Status: COMPLETED | OUTPATIENT
Start: 2024-11-07 | End: 2024-11-07

## 2024-11-07 RX ORDER — DIPHENHYDRAMINE HCL 25 MG
25 CAPSULE ORAL EVERY 6 HOURS PRN
Qty: 20 CAPSULE | Refills: 0 | Status: SHIPPED | OUTPATIENT
Start: 2024-11-07

## 2024-11-07 RX ORDER — PREDNISONE 20 MG/1
60 TABLET ORAL DAILY
Qty: 15 TABLET | Refills: 0 | Status: SHIPPED | OUTPATIENT
Start: 2024-11-07 | End: 2024-11-12

## 2024-11-07 RX ADMIN — DEXAMETHASONE SODIUM PHOSPHATE 8 MG: 4 INJECTION, SOLUTION INTRA-ARTICULAR; INTRALESIONAL; INTRAMUSCULAR; INTRAVENOUS; SOFT TISSUE at 10:11

## 2024-11-07 RX ADMIN — DIPHENHYDRAMINE HYDROCHLORIDE 25 MG: 25 CAPSULE ORAL at 10:11

## 2024-11-07 RX ADMIN — FAMOTIDINE 20 MG: 20 TABLET ORAL at 10:11

## 2024-11-07 RX ADMIN — CEPHALEXIN 500 MG: 250 CAPSULE ORAL at 10:11

## 2024-11-08 NOTE — ED PROVIDER NOTES
Encounter Date: 2024       History     Chief Complaint   Patient presents with    Leg Swelling     Pt L leg red and swelling     54-year-old male presented emergency department with left leg redness and rash and swelling.  Patient had this for about 4-5 days and not getting better and is spreading so concerned and came here.  Denies fever or chills or nausea vomiting chest pain or shortness of breath or abdominal pain or weakness or numbness.      Review of patient's allergies indicates:  No Known Allergies  Past Medical History:   Diagnosis Date    COVID-19     Gout, unspecified      Past Surgical History:   Procedure Laterality Date    COLONOSCOPY  2023    FINGER SURGERY      finger laceration- near amputation    FRACTURE SURGERY      Left middle finger put back on.    KNEE SURGERY Left     scope    ROTATOR CUFF REPAIR Right     TONSILLECTOMY      VASECTOMY       Family History   Problem Relation Name Age of Onset    Cancer Father Shayne juares         Lung Ca    Alcohol abuse Father Shayne juares     Early death Father Shayne juares      Social History     Tobacco Use    Smoking status: Former     Current packs/day: 0.00     Average packs/day: 1 pack/day for 15.0 years (15.0 ttl pk-yrs)     Types: Cigarettes     Start date: 1996     Quit date: 2011     Years since quittin.7    Smokeless tobacco: Never   Substance Use Topics    Alcohol use: Not Currently    Drug use: Never     Review of Systems   Constitutional: Negative.    HENT: Negative.     Eyes: Negative.    Respiratory: Negative.     Cardiovascular:  Positive for leg swelling.   Gastrointestinal: Negative.    Endocrine: Negative.    Genitourinary: Negative.    Musculoskeletal: Negative.    Skin:  Positive for rash.   Allergic/Immunologic: Negative.    Neurological: Negative.    Hematological: Negative.    Psychiatric/Behavioral: Negative.     All other systems reviewed and are negative.      Physical Exam     Initial Vitals  [11/07/24 2005]   BP Pulse Resp Temp SpO2   (!) 142/87 71 18 98.3 °F (36.8 °C) 97 %      MAP       --         Physical Exam    Nursing note and vitals reviewed.  Constitutional: He appears well-developed and well-nourished.   HENT:   Head: Normocephalic and atraumatic.   Nose: Nose normal.   Eyes: Conjunctivae and EOM are normal.   Neck: No tracheal deviation present.   Normal range of motion.  Cardiovascular:  Normal rate, regular rhythm, normal heart sounds and intact distal pulses.     Exam reveals no friction rub.       No murmur heard.  Pulmonary/Chest: Breath sounds normal. No respiratory distress. He has no wheezes. He has no rales.   Abdominal: Abdomen is soft. He exhibits no distension. There is no abdominal tenderness.   Musculoskeletal:         General: Normal range of motion.      Cervical back: Normal range of motion.      Comments: Mild tenderness of the left leg and rash and dermatitis and erythema noted on the anterior aspect of the left leg consistent with contact dermatitis with possible secondary bacterial infection or cellulitis.  Extremity otherwise neurovascularly intact.  No calf tenderness     Neurological: He is alert and oriented to person, place, and time. He has normal strength.   Skin: Skin is warm and dry. Capillary refill takes less than 2 seconds. Rash noted. There is erythema.   Psychiatric: He has a normal mood and affect. Thought content normal.         ED Course   Procedures  Labs Reviewed   CBC W/ AUTO DIFFERENTIAL - Abnormal       Result Value    WBC 9.94      RBC 5.41      Hemoglobin 15.6      Hematocrit 45.5      MCV 84      MCH 28.8      MCHC 34.3      RDW 13.5      Platelets 248      MPV 11.0      Immature Granulocytes 0.2      Gran # (ANC) 5.1      Immature Grans (Abs) 0.02      Lymph # 3.1      Mono # 0.9      Eos # 0.8 (*)     Baso # 0.06      nRBC 0      Gran % 51.5      Lymph % 30.9      Mono % 8.7      Eosinophil % 8.1 (*)     Basophil % 0.6      Differential Method  Automated     COMPREHENSIVE METABOLIC PANEL - Abnormal    Sodium 140      Potassium 3.7      Chloride 101      CO2 29      Glucose 115 (*)     BUN 16      Creatinine 1.1      Calcium 9.5      Total Protein 6.8      Albumin 4.6      Total Bilirubin 0.4      Alkaline Phosphatase 68      AST 27      ALT 61 (*)     eGFR >60.0      Anion Gap 10     POCT GLUCOSE - Abnormal    POCT Glucose 114 (*)    CULTURE, BLOOD    Narrative:     Collection has been rescheduled by AJO at 11/07/2024 21:09 Reason:   Patient unavailable, not in chair   Collection has been rescheduled by AJO at 11/07/2024 21:09 Reason:   Patient unavailable, not in chair    CULTURE, BLOOD    Narrative:     Collection has been rescheduled by AJO at 11/07/2024 21:09 Reason:   Patient unavailable, not in chair   Collection has been rescheduled by AJO at 11/07/2024 21:09 Reason:   Patient unavailable, not in chair    URINALYSIS, REFLEX TO URINE CULTURE    Specimen UA Urine, Clean Catch      Color, UA Yellow      Appearance, UA Clear      pH, UA 7.0      Specific Gravity, UA 1.020      Protein, UA Negative      Glucose, UA Negative      Ketones, UA Negative      Bilirubin (UA) Negative      Occult Blood UA Negative      Nitrite, UA Negative      Urobilinogen, UA Negative      Leukocytes, UA Negative      Narrative:     Specimen Source->Urine   LACTIC ACID, PLASMA    Lactate (Lactic Acid) 1.2     C-REACTIVE PROTEIN    CRP 0.30     POCT GLUCOSE MONITORING CONTINUOUS          Imaging Results              US Lower Extremity Veins Left (Final result)  Result time 11/07/24 20:57:56      Final result by Sharan Giron MD (11/07/24 20:57:56)                   Impression:      No evidence of deep venous thrombosis in the left lower extremity.      Electronically signed by: Sharan Giron  Date:    11/07/2024  Time:    20:57               Narrative:    EXAMINATION:  US LOWER EXTREMITY VEINS LEFT    CLINICAL HISTORY:  Other specified soft tissue  disorders    TECHNIQUE:  Duplex and color flow Doppler evaluation and graded compression of the left lower extremity veins was performed.    COMPARISON:  None    FINDINGS:  Left thigh veins: The common femoral, femoral, popliteal, upper greater saphenous, and deep femoral veins are patent and free of thrombus. The veins are normally compressible and have normal phasic flow and augmentation response.    Left calf veins: The visualized calf veins are patent.    Miscellaneous: Edema.                                       Medications   dexAMETHasone injection 8 mg (8 mg Intramuscular Given 11/7/24 2204)   diphenhydrAMINE capsule 25 mg (25 mg Oral Given 11/7/24 2203)   famotidine tablet 20 mg (20 mg Oral Given 11/7/24 2203)   cephALEXin capsule 500 mg (500 mg Oral Given 11/7/24 2203)     Medical Decision Making  54-year-old male with left lower extremity rash and dermatitis consistent with contact dermatitis.  Ultrasound done and no evidence of DVT noted.  Clinically suspicion for DVT is low.  Screening labs unremarkable and this was all ordered by tele triaged.  Patient did have improvement of symptoms and patient is nontoxic.  Discharged with instructions and follow-up.  Treated with steroids, antihistamines and antibiotic given just in case this is developing secondary bacterial cellulitis.    Amount and/or Complexity of Data Reviewed  Labs: ordered. Decision-making details documented in ED Course.  Radiology: ordered and independent interpretation performed. Decision-making details documented in ED Course.    Risk  OTC drugs.  Prescription drug management.                                      Clinical Impression:  Final diagnoses:  [M79.89] Leg swelling  [L25.9] Contact dermatitis, unspecified contact dermatitis type, unspecified trigger (Primary)          ED Disposition Condition    Discharge Stable          ED Prescriptions       Medication Sig Dispense Start Date End Date Auth. Provider    cephALEXin (KEFLEX) 500  MG capsule Take 1 capsule (500 mg total) by mouth 4 (four) times daily. for 5 days 20 capsule 11/7/2024 11/12/2024 Rudy Sullivan MD    predniSONE (DELTASONE) 20 MG tablet Take 3 tablets (60 mg total) by mouth once daily. for 5 days 15 tablet 11/7/2024 11/12/2024 Rudy Sullivan MD    famotidine (PEPCID) 20 MG tablet Take 1 tablet (20 mg total) by mouth 2 (two) times daily. 20 tablet 11/7/2024 11/7/2025 Rudy Sullivan MD    diphenhydrAMINE (BENADRYL) 25 mg capsule Take 1 capsule (25 mg total) by mouth every 6 (six) hours as needed. 20 capsule 11/7/2024 -- Rudy Sullivan MD          Follow-up Information       Follow up With Specialties Details Why Contact Info    Becky Brown NP Family Medicine In 2 days  [address] [facility]  [address]  [place] [address]  [phone]               Rudy Sullivan MD  11/07/24 0034

## 2024-11-08 NOTE — FIRST PROVIDER EVALUATION
Emergency Department TeleTriage Encounter Note      CHIEF COMPLAINT    Chief Complaint   Patient presents with    Leg Swelling     Pt L leg red and swelling       VITAL SIGNS   Initial Vitals [11/07/24 2005]   BP Pulse Resp Temp SpO2   (!) 142/87 71 18 98.3 °F (36.8 °C) 97 %      MAP       --            ALLERGIES    Review of patient's allergies indicates:  No Known Allergies    PROVIDER TRIAGE NOTE  Verbal consent for the teletriage evaluation was given by the patient at the start of the evaluation.  All efforts will be made to maintain patient's privacy during the evaluation.      This is a teletriage evaluation of a 54 y.o. male presenting to the ED with c/o LLE redness and swelling that started 2.5 weeks ago.  Treated with topical meds by PCP last week with no improvement. Limited physical exam via telehealth: The patient is awake, alert, answering questions appropriately and is not in respiratory distress.  As the Teletriage provider, I performed an initial assessment and ordered appropriate labs and imaging studies, if any, to facilitate the patient's care once placed in the ED. Once a room is available, care and a full evaluation will be completed by an alternate ED provider.  Any additional orders and the final disposition will be determined by that provider.  All imaging and labs will not be followed-up by the Teletriage Team, including myself.          ORDERS  Labs Reviewed   CULTURE, BLOOD   CULTURE, BLOOD   CBC W/ AUTO DIFFERENTIAL   COMPREHENSIVE METABOLIC PANEL   URINALYSIS, REFLEX TO URINE CULTURE   LACTIC ACID, PLASMA   C-REACTIVE PROTEIN   POCT GLUCOSE MONITORING CONTINUOUS       ED Orders (720h ago, onward)      Start Ordered     Status Ordering Provider    11/07/24 2011 11/07/24 2010  Urinalysis, Reflex to Urine Culture Urine, Clean Catch  STAT         Ordered JOHN GALEANO    11/07/24 2011 11/07/24 2010  Lactic acid, plasma  STAT         Ordered JOHN AGLEANO    11/07/24 2011 11/07/24 2010   Blood culture #1  STAT        Comments: Blood Culture #1      Ordered JOHN GALEANO    11/07/24 2011 11/07/24 2010  Blood culture #2  STAT        Comments: Blood Culture #2      Ordered JOHN GALEANO    11/07/24 2011 11/07/24 2010  US Lower Extremity Veins Left  1 time imaging         Ordered JOHN GALEANO    11/07/24 2011 11/07/24 2010  C-reactive protein  STAT         Ordered JOHN GALEANO    11/07/24 2010 11/07/24 2010  Saline lock IV  Once         Ordered JOHN GALEANO    11/07/24 2010 11/07/24 2010  CBC auto differential  STAT         Ordered JOHN GALEANO    11/07/24 2010 11/07/24 2010  Comprehensive metabolic panel  STAT         Ordered JOHN GALEANO    11/07/24 2010 11/07/24 2010  POCT glucose  Once         Ordered JOHN GALEANO              Virtual Visit Note: The provider triage portion of this emergency department evaluation and documentation was performed via [employer], a HIPAA-compliant telemedicine application, in concert with a tele-presenter in the room. A face to face patient evaluation with one of my colleagues will occur once the patient is placed in an emergency department room.      DISCLAIMER: This note was prepared with [employer] voice recognition transcription software. Garbled syntax, mangled pronouns, and other bizarre constructions may be attributed to that software system.

## 2024-11-13 LAB
BACTERIA BLD CULT: NORMAL
BACTERIA BLD CULT: NORMAL

## 2024-11-16 LAB
OHS QRS DURATION: 112 MS
OHS QTC CALCULATION: 422 MS

## 2024-12-30 ENCOUNTER — TELEPHONE (OUTPATIENT)
Dept: FAMILY MEDICINE | Facility: CLINIC | Age: 54
End: 2024-12-30
Payer: COMMERCIAL

## 2024-12-30 DIAGNOSIS — Z12.5 PROSTATE CANCER SCREENING: ICD-10-CM

## 2024-12-30 DIAGNOSIS — Z00.01 ENCOUNTER FOR GENERAL ADULT MEDICAL EXAMINATION WITH ABNORMAL FINDINGS: ICD-10-CM

## 2024-12-30 DIAGNOSIS — I10 PRIMARY HYPERTENSION: Primary | ICD-10-CM

## 2025-08-20 ENCOUNTER — PATIENT MESSAGE (OUTPATIENT)
Dept: ADMINISTRATIVE | Facility: HOSPITAL | Age: 55
End: 2025-08-20
Payer: COMMERCIAL